# Patient Record
Sex: FEMALE | Race: BLACK OR AFRICAN AMERICAN | Employment: FULL TIME | URBAN - METROPOLITAN AREA
[De-identification: names, ages, dates, MRNs, and addresses within clinical notes are randomized per-mention and may not be internally consistent; named-entity substitution may affect disease eponyms.]

---

## 2018-10-17 ENCOUNTER — HOSPITAL ENCOUNTER (EMERGENCY)
Facility: HOSPITAL | Age: 48
Discharge: HOME | End: 2018-10-17
Attending: EMERGENCY MEDICINE

## 2018-10-17 VITALS
WEIGHT: 215 LBS | HEIGHT: 64 IN | BODY MASS INDEX: 36.7 KG/M2 | HEART RATE: 79 BPM | RESPIRATION RATE: 20 BRPM | DIASTOLIC BLOOD PRESSURE: 106 MMHG | OXYGEN SATURATION: 99 % | SYSTOLIC BLOOD PRESSURE: 179 MMHG | TEMPERATURE: 98 F

## 2018-10-17 DIAGNOSIS — S39.012A BACK STRAIN, INITIAL ENCOUNTER: Primary | ICD-10-CM
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
## 2018-10-17 DIAGNOSIS — I10 ASYMPTOMATIC HYPERTENSION: ICD-10-CM

## 2018-10-17 PROCEDURE — 99284 EMERGENCY DEPT VISIT MOD MDM: CPT

## 2018-10-17 RX ORDER — IBUPROFEN 600 MG/1
600 TABLET ORAL EVERY 8 HOURS PRN
Qty: 20 TABLET | Refills: 0 | Status: SHIPPED | OUTPATIENT
Start: 2018-10-17 | End: 2018-10-24

## 2018-10-17 RX ORDER — AMLODIPINE BESYLATE 5 MG/1
5 TABLET ORAL ONCE
Status: COMPLETED | OUTPATIENT
Start: 2018-10-17 | End: 2018-10-17

## 2018-10-17 RX ORDER — AMLODIPINE BESYLATE 5 MG/1
5 TABLET ORAL DAILY
Qty: 30 TABLET | Refills: 0 | Status: SHIPPED | OUTPATIENT
Start: 2018-10-17

## 2018-10-17 RX ORDER — CYCLOBENZAPRINE HCL 10 MG
10 TABLET ORAL 3 TIMES DAILY PRN
Qty: 20 TABLET | Refills: 0 | Status: SHIPPED | OUTPATIENT
Start: 2018-10-17 | End: 2018-10-24
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
## 2022-03-24 ENCOUNTER — APPOINTMENT (OUTPATIENT)
Dept: ULTRASOUND IMAGING | Facility: HOSPITAL | Age: 52
End: 2022-03-24
Attending: EMERGENCY MEDICINE
Payer: MEDICAID

## 2022-03-24 ENCOUNTER — APPOINTMENT (OUTPATIENT)
Dept: CT IMAGING | Facility: HOSPITAL | Age: 52
End: 2022-03-24
Attending: EMERGENCY MEDICINE
Payer: MEDICAID

## 2022-03-24 ENCOUNTER — APPOINTMENT (OUTPATIENT)
Dept: MRI IMAGING | Facility: HOSPITAL | Age: 52
End: 2022-03-24
Attending: INTERNAL MEDICINE
Payer: MEDICAID

## 2022-03-24 ENCOUNTER — HOSPITAL ENCOUNTER (INPATIENT)
Facility: HOSPITAL | Age: 52
LOS: 3 days | Discharge: HOME | End: 2022-03-27
Attending: EMERGENCY MEDICINE
Payer: MEDICAID

## 2022-03-24 ENCOUNTER — HOSPITAL ENCOUNTER (INPATIENT)
Facility: HOSPITAL | Age: 52
LOS: 3 days | Discharge: HOME | End: 2022-03-27
Attending: EMERGENCY MEDICINE | Admitting: HOSPITALIST
Payer: MEDICAID

## 2022-03-24 DIAGNOSIS — R74.01 TRANSAMINITIS: Primary | ICD-10-CM

## 2022-03-24 DIAGNOSIS — I10 UNCONTROLLED HYPERTENSION: ICD-10-CM

## 2022-03-24 DIAGNOSIS — E87.1 HYPONATREMIA: ICD-10-CM

## 2022-03-24 DIAGNOSIS — R10.9 ABDOMINAL PAIN, ACUTE: ICD-10-CM

## 2022-03-24 DIAGNOSIS — R11.2 NAUSEA VOMITING AND DIARRHEA: ICD-10-CM

## 2022-03-24 DIAGNOSIS — R19.7 NAUSEA VOMITING AND DIARRHEA: ICD-10-CM

## 2022-03-24 DIAGNOSIS — K81.9 CHOLECYSTITIS: ICD-10-CM

## 2022-03-24 PROBLEM — K80.63 CALCULUS OF GALLBLADDER AND BILE DUCT WITH ACUTE CHOLECYSTITIS, WITH OBSTRUCTION: Status: ACTIVE | Noted: 2022-03-24

## 2022-03-24 PROBLEM — R73.9 HYPERGLYCEMIA: Status: ACTIVE | Noted: 2022-03-24

## 2022-03-24 LAB
ALBUMIN SERPL-MCNC: 2.8 G/DL (ref 3.4–5)
ALP LIVER SERPL-CCNC: 790 U/L
ALT SERPL-CCNC: 169 U/L
ANION GAP SERPL CALC-SCNC: 9 MMOL/L (ref 0–18)
AST SERPL-CCNC: 185 U/L (ref 15–37)
BASOPHILS # BLD AUTO: 0.02 X10(3) UL (ref 0–0.2)
BASOPHILS NFR BLD AUTO: 0.2 %
BILIRUB DIRECT SERPL-MCNC: 4.9 MG/DL (ref 0–0.2)
BILIRUB SERPL-MCNC: 8.1 MG/DL (ref 0.1–2)
BILIRUB UR QL CFM: NEGATIVE
BUN BLD-MCNC: 8 MG/DL (ref 7–18)
BUN/CREAT SERPL: 11 (ref 10–20)
CALCIUM BLD-MCNC: 9.1 MG/DL (ref 8.5–10.1)
CHLORIDE SERPL-SCNC: 90 MMOL/L (ref 98–112)
CO2 SERPL-SCNC: 28 MMOL/L (ref 21–32)
COLOR UR: YELLOW
CREAT BLD-MCNC: 0.73 MG/DL
DEPRECATED RDW RBC AUTO: 44.2 FL (ref 35.1–46.3)
EOSINOPHIL # BLD AUTO: 0 X10(3) UL (ref 0–0.7)
EOSINOPHIL NFR BLD AUTO: 0 %
ERYTHROCYTE [DISTWIDTH] IN BLOOD BY AUTOMATED COUNT: 13.1 % (ref 11–15)
GLUCOSE BLD-MCNC: 136 MG/DL (ref 70–99)
GLUCOSE UR-MCNC: 50 MG/DL
HAV IGM SER QL: NONREACTIVE
HBV CORE IGM SER QL: NONREACTIVE
HBV SURFACE AG SERPL QL IA: NONREACTIVE
HCT VFR BLD AUTO: 43.5 %
HCV AB SERPL QL IA: NONREACTIVE
HGB BLD-MCNC: 14.4 G/DL
HYALINE CASTS #/AREA URNS AUTO: PRESENT /LPF
IMM GRANULOCYTES # BLD AUTO: 0.05 X10(3) UL (ref 0–1)
IMM GRANULOCYTES NFR BLD: 0.4 %
LEUKOCYTE ESTERASE UR QL STRIP.AUTO: NEGATIVE
LIPASE SERPL-CCNC: 116 U/L (ref 73–393)
LYMPHOCYTES # BLD AUTO: 1.44 X10(3) UL (ref 1–4)
LYMPHOCYTES NFR BLD AUTO: 10.9 %
MCH RBC QN AUTO: 30.5 PG (ref 26–34)
MCHC RBC AUTO-ENTMCNC: 33.1 G/DL (ref 31–37)
MCV RBC AUTO: 92.2 FL
MONOCYTES # BLD AUTO: 0.57 X10(3) UL (ref 0.1–1)
MONOCYTES NFR BLD AUTO: 4.3 %
NEUTROPHILS # BLD AUTO: 11.11 X10 (3) UL (ref 1.5–7.7)
NEUTROPHILS # BLD AUTO: 11.11 X10(3) UL (ref 1.5–7.7)
NEUTROPHILS NFR BLD AUTO: 84.2 %
NITRITE UR QL STRIP.AUTO: NEGATIVE
OSMOLALITY SERPL CALC.SUM OF ELEC: 264 MOSM/KG (ref 275–295)
PH UR: 6 [PH] (ref 5–8)
PLATELET # BLD AUTO: 298 10(3)UL (ref 150–450)
POTASSIUM SERPL-SCNC: 3.8 MMOL/L (ref 3.5–5.1)
PROT SERPL-MCNC: 9.9 G/DL (ref 6.4–8.2)
PROT UR-MCNC: >=500 MG/DL
RBC # BLD AUTO: 4.72 X10(6)UL
RBC #/AREA URNS AUTO: >10 /HPF
SARS-COV-2 RNA RESP QL NAA+PROBE: NOT DETECTED
SODIUM SERPL-SCNC: 127 MMOL/L (ref 136–145)
SP GR UR STRIP: 1.02 (ref 1–1.03)
UROBILINOGEN UR STRIP-ACNC: 4
VIT C UR-MCNC: NEGATIVE MG/DL
WBC # BLD AUTO: 13.2 X10(3) UL (ref 4–11)

## 2022-03-24 PROCEDURE — 80048 BASIC METABOLIC PNL TOTAL CA: CPT | Performed by: EMERGENCY MEDICINE

## 2022-03-24 PROCEDURE — 96361 HYDRATE IV INFUSION ADD-ON: CPT

## 2022-03-24 PROCEDURE — 80074 ACUTE HEPATITIS PANEL: CPT | Performed by: EMERGENCY MEDICINE

## 2022-03-24 PROCEDURE — 96374 THER/PROPH/DIAG INJ IV PUSH: CPT

## 2022-03-24 PROCEDURE — 80076 HEPATIC FUNCTION PANEL: CPT | Performed by: SURGERY

## 2022-03-24 PROCEDURE — 99285 EMERGENCY DEPT VISIT HI MDM: CPT

## 2022-03-24 PROCEDURE — 74181 MRI ABDOMEN W/O CONTRAST: CPT | Performed by: INTERNAL MEDICINE

## 2022-03-24 PROCEDURE — 80076 HEPATIC FUNCTION PANEL: CPT | Performed by: EMERGENCY MEDICINE

## 2022-03-24 PROCEDURE — 83690 ASSAY OF LIPASE: CPT | Performed by: EMERGENCY MEDICINE

## 2022-03-24 PROCEDURE — 81001 URINALYSIS AUTO W/SCOPE: CPT | Performed by: EMERGENCY MEDICINE

## 2022-03-24 PROCEDURE — 96375 TX/PRO/DX INJ NEW DRUG ADDON: CPT

## 2022-03-24 PROCEDURE — 74177 CT ABD & PELVIS W/CONTRAST: CPT | Performed by: EMERGENCY MEDICINE

## 2022-03-24 PROCEDURE — 76376 3D RENDER W/INTRP POSTPROCES: CPT | Performed by: INTERNAL MEDICINE

## 2022-03-24 PROCEDURE — 76705 ECHO EXAM OF ABDOMEN: CPT | Performed by: EMERGENCY MEDICINE

## 2022-03-24 PROCEDURE — 85025 COMPLETE CBC W/AUTO DIFF WBC: CPT | Performed by: EMERGENCY MEDICINE

## 2022-03-24 RX ORDER — LABETALOL HYDROCHLORIDE 5 MG/ML
20 INJECTION, SOLUTION INTRAVENOUS ONCE
Status: COMPLETED | OUTPATIENT
Start: 2022-03-24 | End: 2022-03-24

## 2022-03-24 RX ORDER — AMLODIPINE BESYLATE 5 MG/1
5 TABLET ORAL DAILY
Status: DISCONTINUED | OUTPATIENT
Start: 2022-03-24 | End: 2022-03-25

## 2022-03-24 RX ORDER — HEPARIN SODIUM 5000 [USP'U]/ML
5000 INJECTION, SOLUTION INTRAVENOUS; SUBCUTANEOUS EVERY 12 HOURS SCHEDULED
Status: DISCONTINUED | OUTPATIENT
Start: 2022-03-24 | End: 2022-03-27

## 2022-03-24 RX ORDER — HYDRALAZINE HYDROCHLORIDE 20 MG/ML
5 INJECTION INTRAMUSCULAR; INTRAVENOUS EVERY 4 HOURS PRN
Status: DISCONTINUED | OUTPATIENT
Start: 2022-03-24 | End: 2022-03-27

## 2022-03-24 RX ORDER — ONDANSETRON 2 MG/ML
4 INJECTION INTRAMUSCULAR; INTRAVENOUS ONCE
Status: COMPLETED | OUTPATIENT
Start: 2022-03-24 | End: 2022-03-24

## 2022-03-24 RX ORDER — POTASSIUM CHLORIDE 14.9 MG/ML
20 INJECTION INTRAVENOUS ONCE
Status: COMPLETED | OUTPATIENT
Start: 2022-03-24 | End: 2022-03-24

## 2022-03-24 RX ORDER — SODIUM CHLORIDE 9 MG/ML
INJECTION, SOLUTION INTRAVENOUS CONTINUOUS
Status: DISCONTINUED | OUTPATIENT
Start: 2022-03-24 | End: 2022-03-27

## 2022-03-24 RX ORDER — ONDANSETRON 2 MG/ML
4 INJECTION INTRAMUSCULAR; INTRAVENOUS EVERY 6 HOURS PRN
Status: DISCONTINUED | OUTPATIENT
Start: 2022-03-24 | End: 2022-03-27

## 2022-03-24 RX ORDER — SODIUM CHLORIDE 9 MG/ML
INJECTION, SOLUTION INTRAVENOUS CONTINUOUS
Status: ACTIVE | OUTPATIENT
Start: 2022-03-24 | End: 2022-03-24

## 2022-03-24 NOTE — ED INITIAL ASSESSMENT (HPI)
Patient arrives ambulatory through triage with c/o of abdominal pain, and vomiting. Patient states \"I ate chipotle Tuesday and I have felt sick ever since\".

## 2022-03-24 NOTE — PLAN OF CARE
Patient is A&Ox4, on RA, remote tele in place. BP elevated, MD notified, Hydralazine given. Felt bloated, pain in RUQ. Was nauseated, Zofran given. NPO except sips with meds. Need stool for GI panel and Cdiff, endorsed to Leandro Dumont RN. MRI/MRCP ordered, awaiting results. Started on IV abx Zosyn and IVF. Plan anticipating cholecystectomy after ERCP evaluation if MRCP is positive. Call light w/in reach, safety measures in place, frequent roundings being done, will continue to monitor. Plan of care endorsed to Leandro Dumont [facility]. Problem: Patient Centered Care  Goal: Patient preferences are identified and integrated in the patient's plan of care  Description: Interventions:  - What would you like us to know as we care for you?  Pt home with son  - Provide timely, complete, and accurate information to patient/family  - Incorporate patient and family knowledge, values, beliefs, and cultural backgrounds into the planning and delivery of care  - Encourage patient/family to participate in care and decision-making at the level they choose  - Honor patient and family perspectives and choices  Outcome: Progressing     Problem: Patient/Family Goals  Goal: Patient/Family Long Term Goal  Description: Patient's Long Term Goal: To discharge home    Interventions:  -Monitor labs, results, and vitals  -Administer medication as prescribed  -Monitor for nausea and vomiting  -Assess and manage pain  -Assess for any new onset or worsening condition  -Monitor and prevent infection  - See additional Care Plan goals for specific interventions  Outcome: Progressing  Goal: Patient/Family Short Term Goal  Description: Patient's Short Term Goal: Manage abdominal discomfort    Interventions:   -Monitor and assess for abdominal pain  -Monitor for nausea and vomiting  -Administer medication as prescribed  -Manage new onset or worsening condition  - See additional Care Plan goals for specific interventions  Outcome: Progressing     Problem: GASTROINTESTINAL - ADULT  Goal: Minimal or absence of nausea and vomiting  Description: INTERVENTIONS:  - Maintain adequate hydration with IV or PO as ordered and tolerated  - Evaluate effectiveness of ordered antiemetic medications  - Provide nonpharmacologic comfort measures as appropriate  - Advance diet as tolerated, if ordered  - Obtain nutritional consult as needed  - Evaluate fluid balance  Outcome: Progressing  Goal: Maintains or returns to baseline bowel function  Description: INTERVENTIONS:  - Assess bowel function  - Maintain adequate hydration with IV or PO as ordered and tolerated  - Evaluate effectiveness of GI medications  - Encourage mobilization and activity  - Obtain nutritional consult as needed  - Establish a toileting routine/schedule  - Consider collaborating with pharmacy to review patient's medication profile  Outcome: Progressing     Problem: PAIN - ADULT  Goal: Verbalizes/displays adequate comfort level or patient's stated pain goal  Description: INTERVENTIONS:  - Encourage pt to monitor pain and request assistance  - Assess pain using appropriate pain scale  - Administer analgesics based on type and severity of pain and evaluate response  - Implement non-pharmacological measures as appropriate and evaluate response  - Consider cultural and social influences on pain and pain management  - Manage/alleviate anxiety  - Utilize distraction and/or relaxation techniques  - Notify MD/LIP if interventions unsuccessful or patient reports new pain  Outcome: Progressing     Problem: RISK FOR INFECTION - ADULT  Goal: Absence of fever/infection during anticipated neutropenic period  Description: INTERVENTIONS  - Monitor WBC  - Administer growth factors as ordered  - Implement neutropenic guidelines  Outcome: Progressing     Problem: DISCHARGE PLANNING  Goal: Discharge to home or other facility with appropriate resources  Description: INTERVENTIONS:  - Identify barriers to discharge w/pt and caregiver  - Include patient/family/discharge partner in discharge planning  - Arrange for needed discharge resources and transportation as appropriate  - Identify discharge learning needs (meds, wound care, etc)  - Arrange for interpreters to assist at discharge as needed  - Consider post-discharge preferences of patient/family/discharge partner  - Complete POLST form as appropriate  - Assess patient's ability to be responsible for managing their own health  - Refer to Case Management Department for coordinating discharge planning if the patient needs post-hospital services based on physician/LIP order or complex needs related to functional status, cognitive ability or social support system  Outcome: Progressing     Problem: HEMATOLOGIC - ADULT  Goal: Free from bleeding injury  Description: INTERVENTIONS:  - Avoid intramuscular injections, enemas and rectal medication administration  - Ensure safe mobilization of patient  - Hold pressure on venipuncture sites to achieve adequate hemostasis  - Assess for signs and symptoms of internal bleeding  - Monitor lab trends  - Patient is to report abnormal signs of bleeding to staff  - Avoid use of toothpicks and dental floss  - Use electric shaver for shaving  - Use soft bristle tooth brush  - Limit straining and forceful nose blowing  Outcome: Progressing

## 2022-03-24 NOTE — ED PROVIDER NOTES
Contrast enhanced CT of the abdomen/pelvis. Comparison: None. IMPRESSION:    Heterogeneously distended gallbladder containing irregular hyperdense material.  Findings may represent combination of heterogeneous wall thickening and hyperdense sludge/hemorrhage. Underlying small calculi and/or mass would be difficult to entirely exclude. Acute cholecystitis is a possibility. Gallbladder ultrasound is recommended for further characterization. There is mild intrahepatic biliary duct dilatation. Common bile duct is not dilated. Minimal stranding around the gallbladder. Portal vein, SMV, as well as the splenic vein appear patent. No evidence of AAA. Scattered fluid loops of small bowel. No evidence of small or large obstruction. No evidence of colitis or diverticulitis. Moderate stool burden on the right side. No hydronephrosis or nephrolithiasis. Decompressed ureters. No inflammation of the urinary bladder. No evidence of free intraperitoneal air to suggest perforation. Scattered subcentimeter retroperitoneal lymph nodes. Patient signed out to me by Dr. Jocelin Stallings. Patient with transaminitis. Patient with CT abdomen shows gallbladder containing hyperdense material with wall thickening hyperdense sludge. Will order ultrasound of the right upper quadrant to rule out choledocholithiasis. Patient will be admitted. Dr. Jaimee Allison notified of admission. Surgery on consult Dr. Aysha Bridges Time: 32 minutes including time spent examining and re-evaluating the patient, ordering and reviewing laboratory tests, documenting, reviewing previous records, obtaining information from the family, and speaking with consultants, admitting doctors, nurses and medics and excludes any time spent on procedures.

## 2022-03-24 NOTE — ED QUICK NOTES
Orders for admission, patient is aware of plan and ready to go upstairs. Any questions, please call ED RN Hannah Cruz at extension [phone].      Patient Covid vaccination status: Fully vaccinated     COVID Test Ordered in ED: Rapid SARS-CoV-2 by PCR    COVID Suspicion at Admission: Low clinical suspicion for COVID    Running Infusions:  None    Mental Status/LOC at time of transport: x4    Other pertinent information: n/a  CIWA score: N/A   NIH score:  N/A

## 2022-03-25 LAB
ALBUMIN SERPL-MCNC: 2 G/DL (ref 3.4–5)
ALBUMIN SERPL-MCNC: 2.6 G/DL (ref 3.4–5)
ALP LIVER SERPL-CCNC: 530 U/L
ALP LIVER SERPL-CCNC: 778 U/L
ALT SERPL-CCNC: 112 U/L
ALT SERPL-CCNC: 155 U/L
ANION GAP SERPL CALC-SCNC: 4 MMOL/L (ref 0–18)
AST SERPL-CCNC: 162 U/L (ref 15–37)
AST SERPL-CCNC: 95 U/L (ref 15–37)
BASOPHILS # BLD AUTO: 0.04 X10(3) UL (ref 0–0.2)
BASOPHILS NFR BLD AUTO: 0.3 %
BILIRUB DIRECT SERPL-MCNC: 5.8 MG/DL (ref 0–0.2)
BILIRUB DIRECT SERPL-MCNC: 6.2 MG/DL (ref 0–0.2)
BILIRUB SERPL-MCNC: 7.5 MG/DL (ref 0.1–2)
BILIRUB SERPL-MCNC: 8.4 MG/DL (ref 0.1–2)
BUN BLD-MCNC: 13 MG/DL (ref 7–18)
BUN/CREAT SERPL: 14.4 (ref 10–20)
CALCIUM BLD-MCNC: 8.7 MG/DL (ref 8.5–10.1)
CHLORIDE SERPL-SCNC: 104 MMOL/L (ref 98–112)
CO2 SERPL-SCNC: 30 MMOL/L (ref 21–32)
CREAT BLD-MCNC: 0.9 MG/DL
DEPRECATED RDW RBC AUTO: 46.9 FL (ref 35.1–46.3)
EOSINOPHIL # BLD AUTO: 0 X10(3) UL (ref 0–0.7)
EOSINOPHIL NFR BLD AUTO: 0 %
ERYTHROCYTE [DISTWIDTH] IN BLOOD BY AUTOMATED COUNT: 13.4 % (ref 11–15)
GLUCOSE BLD-MCNC: 100 MG/DL (ref 70–99)
HCT VFR BLD AUTO: 36 %
HGB BLD-MCNC: 11.7 G/DL
IMM GRANULOCYTES # BLD AUTO: 0.06 X10(3) UL (ref 0–1)
IMM GRANULOCYTES NFR BLD: 0.5 %
LYMPHOCYTES # BLD AUTO: 3.24 X10(3) UL (ref 1–4)
LYMPHOCYTES NFR BLD AUTO: 25 %
MCH RBC QN AUTO: 31 PG (ref 26–34)
MCHC RBC AUTO-ENTMCNC: 32.5 G/DL (ref 31–37)
MCV RBC AUTO: 95.2 FL
MONOCYTES # BLD AUTO: 0.98 X10(3) UL (ref 0.1–1)
MONOCYTES NFR BLD AUTO: 7.6 %
NEUTROPHILS # BLD AUTO: 8.65 X10 (3) UL (ref 1.5–7.7)
NEUTROPHILS # BLD AUTO: 8.65 X10(3) UL (ref 1.5–7.7)
NEUTROPHILS NFR BLD AUTO: 66.6 %
OSMOLALITY SERPL CALC.SUM OF ELEC: 286 MOSM/KG (ref 275–295)
PLATELET # BLD AUTO: 266 10(3)UL (ref 150–450)
POTASSIUM SERPL-SCNC: 3.7 MMOL/L (ref 3.5–5.1)
POTASSIUM SERPL-SCNC: 3.7 MMOL/L (ref 3.5–5.1)
PROT SERPL-MCNC: 7.4 G/DL (ref 6.4–8.2)
PROT SERPL-MCNC: 8.5 G/DL (ref 6.4–8.2)
RBC # BLD AUTO: 3.78 X10(6)UL
SODIUM SERPL-SCNC: 138 MMOL/L (ref 136–145)
WBC # BLD AUTO: 13 X10(3) UL (ref 4–11)

## 2022-03-25 PROCEDURE — 85025 COMPLETE CBC W/AUTO DIFF WBC: CPT | Performed by: HOSPITALIST

## 2022-03-25 PROCEDURE — 80048 BASIC METABOLIC PNL TOTAL CA: CPT | Performed by: HOSPITALIST

## 2022-03-25 PROCEDURE — 80076 HEPATIC FUNCTION PANEL: CPT | Performed by: PHYSICIAN ASSISTANT

## 2022-03-25 PROCEDURE — 84132 ASSAY OF SERUM POTASSIUM: CPT | Performed by: HOSPITALIST

## 2022-03-25 RX ORDER — ACETAMINOPHEN 325 MG/1
650 TABLET ORAL EVERY 6 HOURS PRN
Status: DISCONTINUED | OUTPATIENT
Start: 2022-03-25 | End: 2022-03-27

## 2022-03-25 RX ORDER — AMLODIPINE BESYLATE 10 MG/1
10 TABLET ORAL DAILY
Status: DISCONTINUED | OUTPATIENT
Start: 2022-03-26 | End: 2022-03-27

## 2022-03-25 NOTE — PROGRESS NOTES
Pt A&ox4. On RA. Hypertensive at times, PRN hydralazine given. RUQ, declined medication. PRN zofran given for nausea. IV fluids and IV abx started. MRI MRCP completed. No interventions at this time per GI. Awaiting Sx. Tolerating clear liquid diet, plan to be NPO at midnight. Up independently. Safety measures in place. Pt calls appropriately.

## 2022-03-25 NOTE — PLAN OF CARE
Problem: Patient Centered Care  Goal: Patient preferences are identified and integrated in the patient's plan of care  Description: Interventions:  - What would you like us to know as we care for you?  Pt home with son  - Provide timely, complete, and accurate information to patient/family  - Incorporate patient and family knowledge, values, beliefs, and cultural backgrounds into the planning and delivery of care  - Encourage patient/family to participate in care and decision-making at the level they choose  - Honor patient and family perspectives and choices  Outcome: Progressing     Problem: Patient/Family Goals  Goal: Patient/Family Long Term Goal  Description: Patient's Long Term Goal: To discharge home    Interventions:  -Monitor labs, results, and vitals  -Administer medication as prescribed  -Monitor for nausea and vomiting  -Assess and manage pain  -Assess for any new onset or worsening condition  -Monitor and prevent infection  - See additional Care Plan goals for specific interventions  Outcome: Progressing  Goal: Patient/Family Short Term Goal  Description: Patient's Short Term Goal: Manage abdominal discomfort    Interventions:   -Monitor and assess for abdominal pain  -Monitor for nausea and vomiting  -Administer medication as prescribed  -Manage new onset or worsening condition  - See additional Care Plan goals for specific interventions  Outcome: Progressing     Problem: GASTROINTESTINAL - ADULT  Goal: Minimal or absence of nausea and vomiting  Description: INTERVENTIONS:  - Maintain adequate hydration with IV or PO as ordered and tolerated  - Evaluate effectiveness of ordered antiemetic medications  - Provide nonpharmacologic comfort measures as appropriate  - Advance diet as tolerated, if ordered  - Obtain nutritional consult as needed  - Evaluate fluid balance  Outcome: Progressing  Goal: Maintains or returns to baseline bowel function  Description: INTERVENTIONS:  - Assess bowel function  - Maintain adequate hydration with IV or PO as ordered and tolerated  - Evaluate effectiveness of GI medications  - Encourage mobilization and activity  - Obtain nutritional consult as needed  - Establish a toileting routine/schedule  - Consider collaborating with pharmacy to review patient's medication profile  Outcome: Progressing     Problem: PAIN - ADULT  Goal: Verbalizes/displays adequate comfort level or patient's stated pain goal  Description: INTERVENTIONS:  - Encourage pt to monitor pain and request assistance  - Assess pain using appropriate pain scale  - Administer analgesics based on type and severity of pain and evaluate response  - Implement non-pharmacological measures as appropriate and evaluate response  - Consider cultural and social influences on pain and pain management  - Manage/alleviate anxiety  - Utilize distraction and/or relaxation techniques  - Notify MD/LIP if interventions unsuccessful or patient reports new pain  Outcome: Progressing     Problem: RISK FOR INFECTION - ADULT  Goal: Absence of fever/infection during anticipated neutropenic period  Description: INTERVENTIONS  - Monitor WBC  - Administer growth factors as ordered  - Implement neutropenic guidelines  Outcome: Progressing     Problem: DISCHARGE PLANNING  Goal: Discharge to home or other facility with appropriate resources  Description: INTERVENTIONS:  - Identify barriers to discharge w/pt and caregiver  - Include patient/family/discharge partner in discharge planning  - Arrange for needed discharge resources and transportation as appropriate  - Identify discharge learning needs (meds, wound care, etc)  - Arrange for interpreters to assist at discharge as needed  - Consider post-discharge preferences of patient/family/discharge partner  - Complete POLST form as appropriate  - Assess patient's ability to be responsible for managing their own health  - Refer to Case Management Department for coordinating discharge planning if the patient needs post-hospital services based on physician/LIP order or complex needs related to functional status, cognitive ability or social support system  Outcome: Progressing     Problem: HEMATOLOGIC - ADULT  Goal: Free from bleeding injury  Description: INTERVENTIONS:  - Avoid intramuscular injections, enemas and rectal medication administration  - Ensure safe mobilization of patient  - Hold pressure on venipuncture sites to achieve adequate hemostasis  - Assess for signs and symptoms of internal bleeding  - Monitor lab trends  - Patient is to report abnormal signs of bleeding to staff  - Avoid use of toothpicks and dental floss  - Use electric shaver for shaving  - Use soft bristle tooth brush  - Limit straining and forceful nose blowing  Outcome: Progressing   No acute changes overnight. Blood pressure is still elevated, PRN Hydralazine given. Minimal tolerable abdominal pain. No nausea and vomiting. Tolerated clear liquid diet last night, patient is now NPO for possible procedure. IV fluids and IV Zosyn continued. Ambulates independently. Voids freely. No bowel movement overnight. Will continue to monitor.

## 2022-03-26 RX ORDER — VANCOMYCIN HYDROCHLORIDE 125 MG/1
125 CAPSULE ORAL DAILY
Status: DISCONTINUED | OUTPATIENT
Start: 2022-03-26 | End: 2022-03-27

## 2022-03-26 NOTE — PLAN OF CARE
Problem: Patient Centered Care  Goal: Patient preferences are identified and integrated in the patient's plan of care  Description: Interventions:  - What would you like us to know as we care for you?  Pt home with son  - Provide timely, complete, and accurate information to patient/family  - Incorporate patient and family knowledge, values, beliefs, and cultural backgrounds into the planning and delivery of care  - Encourage patient/family to participate in care and decision-making at the level they choose  - Honor patient and family perspectives and choices  Outcome: Progressing     Problem: Patient/Family Goals  Goal: Patient/Family Long Term Goal  Description: Patient's Long Term Goal: To discharge home    Interventions:  -Monitor labs, results, and vitals  -Administer medication as prescribed  -Monitor for nausea and vomiting  -Assess and manage pain  -Assess for any new onset or worsening condition  -Monitor and prevent infection  - See additional Care Plan goals for specific interventions  Outcome: Progressing  Goal: Patient/Family Short Term Goal  Description: Patient's Short Term Goal: Manage abdominal discomfort    Interventions:   -Monitor and assess for abdominal pain  -Monitor for nausea and vomiting  -Administer medication as prescribed  -Manage new onset or worsening condition  - See additional Care Plan goals for specific interventions  Outcome: Progressing     Problem: GASTROINTESTINAL - ADULT  Goal: Minimal or absence of nausea and vomiting  Description: INTERVENTIONS:  - Maintain adequate hydration with IV or PO as ordered and tolerated  - Evaluate effectiveness of ordered antiemetic medications  - Provide nonpharmacologic comfort measures as appropriate  - Advance diet as tolerated, if ordered  - Obtain nutritional consult as needed  - Evaluate fluid balance  Outcome: Progressing  Goal: Maintains or returns to baseline bowel function  Description: INTERVENTIONS:  - Assess bowel function  - Maintain adequate hydration with IV or PO as ordered and tolerated  - Evaluate effectiveness of GI medications  - Encourage mobilization and activity  - Obtain nutritional consult as needed  - Establish a toileting routine/schedule  - Consider collaborating with pharmacy to review patient's medication profile  Outcome: Progressing     Problem: PAIN - ADULT  Goal: Verbalizes/displays adequate comfort level or patient's stated pain goal  Description: INTERVENTIONS:  - Encourage pt to monitor pain and request assistance  - Assess pain using appropriate pain scale  - Administer analgesics based on type and severity of pain and evaluate response  - Implement non-pharmacological measures as appropriate and evaluate response  - Consider cultural and social influences on pain and pain management  - Manage/alleviate anxiety  - Utilize distraction and/or relaxation techniques  - Notify MD/LIP if interventions unsuccessful or patient reports new pain  Outcome: Progressing     Problem: RISK FOR INFECTION - ADULT  Goal: Absence of fever/infection during anticipated neutropenic period  Description: INTERVENTIONS  - Monitor WBC  - Administer growth factors as ordered  - Implement neutropenic guidelines  Outcome: Progressing     Problem: DISCHARGE PLANNING  Goal: Discharge to home or other facility with appropriate resources  Description: INTERVENTIONS:  - Identify barriers to discharge w/pt and caregiver  - Include patient/family/discharge partner in discharge planning  - Arrange for needed discharge resources and transportation as appropriate  - Identify discharge learning needs (meds, wound care, etc)  - Arrange for interpreters to assist at discharge as needed  - Consider post-discharge preferences of patient/family/discharge partner  - Complete POLST form as appropriate  - Assess patient's ability to be responsible for managing their own health  - Refer to Case Management Department for coordinating discharge planning if the patient needs post-hospital services based on physician/LIP order or complex needs related to functional status, cognitive ability or social support system  Outcome: Progressing     Problem: HEMATOLOGIC - ADULT  Goal: Free from bleeding injury  Description: INTERVENTIONS:  - Avoid intramuscular injections, enemas and rectal medication administration  - Ensure safe mobilization of patient  - Hold pressure on venipuncture sites to achieve adequate hemostasis  - Assess for signs and symptoms of internal bleeding  - Monitor lab trends  - Patient is to report abnormal signs of bleeding to staff  - Avoid use of toothpicks and dental floss  - Use electric shaver for shaving  - Use soft bristle tooth brush  - Limit straining and forceful nose blowing  Outcome: Progressing     Rodrigo Cardenas states she is feeling better today. Only mild pain/discomfort which is \"positional,\" denies need for prn medication at this time. IVF, IV abx continued. Ambulating independently in room. Voiding freely. Tolerating diet well.

## 2022-03-27 VITALS
WEIGHT: 181 LBS | BODY MASS INDEX: 30.9 KG/M2 | OXYGEN SATURATION: 100 % | HEART RATE: 84 BPM | SYSTOLIC BLOOD PRESSURE: 147 MMHG | HEIGHT: 64 IN | DIASTOLIC BLOOD PRESSURE: 87 MMHG | RESPIRATION RATE: 18 BRPM | TEMPERATURE: 99 F

## 2022-03-27 LAB
ALBUMIN SERPL-MCNC: 2.1 G/DL (ref 3.4–5)
ALBUMIN/GLOB SERPL: 0.4 {RATIO} (ref 1–2)
ALP LIVER SERPL-CCNC: 489 U/L
ALT SERPL-CCNC: 94 U/L
ANION GAP SERPL CALC-SCNC: 5 MMOL/L (ref 0–18)
AST SERPL-CCNC: 89 U/L (ref 15–37)
BASOPHILS # BLD AUTO: 0.05 X10(3) UL (ref 0–0.2)
BASOPHILS NFR BLD AUTO: 0.5 %
BILIRUB DIRECT SERPL-MCNC: 5.2 MG/DL (ref 0–0.2)
BILIRUB SERPL-MCNC: 6.6 MG/DL (ref 0.1–2)
BUN BLD-MCNC: 9 MG/DL (ref 7–18)
BUN/CREAT SERPL: 11.8 (ref 10–20)
CALCIUM BLD-MCNC: 8.4 MG/DL (ref 8.5–10.1)
CHLORIDE SERPL-SCNC: 104 MMOL/L (ref 98–112)
CO2 SERPL-SCNC: 26 MMOL/L (ref 21–32)
CREAT BLD-MCNC: 0.76 MG/DL
DEPRECATED RDW RBC AUTO: 47.4 FL (ref 35.1–46.3)
EOSINOPHIL # BLD AUTO: 0 X10(3) UL (ref 0–0.7)
EOSINOPHIL NFR BLD AUTO: 0 %
ERYTHROCYTE [DISTWIDTH] IN BLOOD BY AUTOMATED COUNT: 13.2 % (ref 11–15)
GLOBULIN PLAS-MCNC: 5.5 G/DL (ref 2.8–4.4)
GLUCOSE BLD-MCNC: 93 MG/DL (ref 70–99)
HCT VFR BLD AUTO: 33.8 %
HGB BLD-MCNC: 10.8 G/DL
IMM GRANULOCYTES # BLD AUTO: 0.05 X10(3) UL (ref 0–1)
IMM GRANULOCYTES NFR BLD: 0.5 %
LYMPHOCYTES # BLD AUTO: 2.62 X10(3) UL (ref 1–4)
LYMPHOCYTES NFR BLD AUTO: 24.2 %
MCH RBC QN AUTO: 30.9 PG (ref 26–34)
MCHC RBC AUTO-ENTMCNC: 32 G/DL (ref 31–37)
MCV RBC AUTO: 96.8 FL
MONOCYTES # BLD AUTO: 0.85 X10(3) UL (ref 0.1–1)
MONOCYTES NFR BLD AUTO: 7.8 %
NEUTROPHILS # BLD AUTO: 7.26 X10 (3) UL (ref 1.5–7.7)
NEUTROPHILS # BLD AUTO: 7.26 X10(3) UL (ref 1.5–7.7)
NEUTROPHILS NFR BLD AUTO: 67 %
OSMOLALITY SERPL CALC.SUM OF ELEC: 278 MOSM/KG (ref 275–295)
PLATELET # BLD AUTO: 311 10(3)UL (ref 150–450)
POTASSIUM SERPL-SCNC: 3.6 MMOL/L (ref 3.5–5.1)
POTASSIUM SERPL-SCNC: 4 MMOL/L (ref 3.5–5.1)
PROT SERPL-MCNC: 7.6 G/DL (ref 6.4–8.2)
RBC # BLD AUTO: 3.49 X10(6)UL
SODIUM SERPL-SCNC: 135 MMOL/L (ref 136–145)
WBC # BLD AUTO: 10.8 X10(3) UL (ref 4–11)

## 2022-03-27 PROCEDURE — 85025 COMPLETE CBC W/AUTO DIFF WBC: CPT | Performed by: SURGERY

## 2022-03-27 PROCEDURE — 82248 BILIRUBIN DIRECT: CPT | Performed by: SURGERY

## 2022-03-27 PROCEDURE — 90471 IMMUNIZATION ADMIN: CPT

## 2022-03-27 PROCEDURE — 80053 COMPREHEN METABOLIC PANEL: CPT | Performed by: HOSPITALIST

## 2022-03-27 RX ORDER — AMLODIPINE BESYLATE 10 MG/1
10 TABLET ORAL DAILY
Qty: 30 TABLET | Refills: 0 | Status: SHIPPED | OUTPATIENT
Start: 2022-03-28 | End: 2022-04-27

## 2022-03-27 RX ORDER — LEVOFLOXACIN 750 MG/1
750 TABLET ORAL DAILY
Qty: 14 TABLET | Refills: 0 | Status: SHIPPED | OUTPATIENT
Start: 2022-03-27 | End: 2022-04-10

## 2022-03-27 NOTE — PLAN OF CARE
Patient discharged home. IV antibiotics completed. Oral antibiotics sent to patient's pharmacy. Patient tolerating diet without nausea or abdominal pain. Flu shot administered at discharge. Problem: Patient Centered Care  Goal: Patient preferences are identified and integrated in the patient's plan of care  Description: Interventions:  - What would you like us to know as we care for you?  Pt home with son  - Provide timely, complete, and accurate information to patient/family  - Incorporate patient and family knowledge, values, beliefs, and cultural backgrounds into the planning and delivery of care  - Encourage patient/family to participate in care and decision-making at the level they choose  - Honor patient and family perspectives and choices  Outcome: Adequate for Discharge     Problem: Patient/Family Goals  Goal: Patient/Family Long Term Goal  Description: Patient's Long Term Goal: To discharge home    Interventions:  -Monitor labs, results, and vitals  -Administer medication as prescribed  -Monitor for nausea and vomiting  -Assess and manage pain  -Assess for any new onset or worsening condition  -Monitor and prevent infection  - See additional Care Plan goals for specific interventions  Outcome: Adequate for Discharge  Goal: Patient/Family Short Term Goal  Description: Patient's Short Term Goal: Manage abdominal discomfort    Interventions:   -Monitor and assess for abdominal pain  -Monitor for nausea and vomiting  -Administer medication as prescribed  -Manage new onset or worsening condition  - See additional Care Plan goals for specific interventions  Outcome: Adequate for Discharge     Problem: GASTROINTESTINAL - ADULT  Goal: Minimal or absence of nausea and vomiting  Description: INTERVENTIONS:  - Maintain adequate hydration with IV or PO as ordered and tolerated  - Evaluate effectiveness of ordered antiemetic medications  - Provide nonpharmacologic comfort measures as appropriate  - Advance diet as tolerated, if ordered  - Obtain nutritional consult as needed  - Evaluate fluid balance  Outcome: Adequate for Discharge  Goal: Maintains or returns to baseline bowel function  Description: INTERVENTIONS:  - Assess bowel function  - Maintain adequate hydration with IV or PO as ordered and tolerated  - Evaluate effectiveness of GI medications  - Encourage mobilization and activity  - Obtain nutritional consult as needed  - Establish a toileting routine/schedule  - Consider collaborating with pharmacy to review patient's medication profile  Outcome: Adequate for Discharge     Problem: PAIN - ADULT  Goal: Verbalizes/displays adequate comfort level or patient's stated pain goal  Description: INTERVENTIONS:  - Encourage pt to monitor pain and request assistance  - Assess pain using appropriate pain scale  - Administer analgesics based on type and severity of pain and evaluate response  - Implement non-pharmacological measures as appropriate and evaluate response  - Consider cultural and social influences on pain and pain management  - Manage/alleviate anxiety  - Utilize distraction and/or relaxation techniques  - Notify MD/LIP if interventions unsuccessful or patient reports new pain  Outcome: Adequate for Discharge     Problem: RISK FOR INFECTION - ADULT  Goal: Absence of fever/infection during anticipated neutropenic period  Description: INTERVENTIONS  - Monitor WBC  - Administer growth factors as ordered  - Implement neutropenic guidelines  Outcome: Adequate for Discharge     Problem: DISCHARGE PLANNING  Goal: Discharge to home or other facility with appropriate resources  Description: INTERVENTIONS:  - Identify barriers to discharge w/pt and caregiver  - Include patient/family/discharge partner in discharge planning  - Arrange for needed discharge resources and transportation as appropriate  - Identify discharge learning needs (meds, wound care, etc)  - Arrange for interpreters to assist at discharge as needed  - Consider post-discharge preferences of patient/family/discharge partner  - Complete POLST form as appropriate  - Assess patient's ability to be responsible for managing their own health  - Refer to Case Management Department for coordinating discharge planning if the patient needs post-hospital services based on physician/LIP order or complex needs related to functional status, cognitive ability or social support system  Outcome: Adequate for Discharge     Problem: HEMATOLOGIC - ADULT  Goal: Free from bleeding injury  Description: INTERVENTIONS:  - Avoid intramuscular injections, enemas and rectal medication administration  - Ensure safe mobilization of patient  - Hold pressure on venipuncture sites to achieve adequate hemostasis  - Assess for signs and symptoms of internal bleeding  - Monitor lab trends  - Patient is to report abnormal signs of bleeding to staff  - Avoid use of toothpicks and dental floss  - Use electric shaver for shaving  - Use soft bristle tooth brush  - Limit straining and forceful nose blowing  Outcome: Adequate for Discharge

## 2022-03-29 ENCOUNTER — PATIENT OUTREACH (OUTPATIENT)
Dept: CASE MANAGEMENT | Age: 52
End: 2022-03-29

## 2022-03-29 NOTE — PROGRESS NOTES
Attempted to contact pt for condition update however no answer. Call continued to ring and did not go to a VM. Will await a returned phone call.

## 2022-03-29 NOTE — PROGRESS NOTES
Attempted to contact pt for condition update however no answer. Call continued to ring and did not go to a . NCM to try again at a later time.    Verify PCP

## 2022-11-27 ENCOUNTER — HOSPITAL ENCOUNTER (INPATIENT)
Facility: HOSPITAL | Age: 52
LOS: 8 days | Discharge: HOME | DRG: 409 | End: 2022-12-06
Attending: EMERGENCY MEDICINE | Admitting: INTERNAL MEDICINE
Payer: MEDICAID

## 2022-11-27 DIAGNOSIS — K80.50 BILIARY COLIC: ICD-10-CM

## 2022-11-27 DIAGNOSIS — K81.0 ACUTE CHOLECYSTITIS: Primary | ICD-10-CM

## 2022-11-27 DIAGNOSIS — K80.50 CHOLEDOCHOLITHIASIS: ICD-10-CM

## 2022-11-28 ENCOUNTER — ANESTHESIA (OUTPATIENT)
Dept: ENDOSCOPY | Facility: HOSPITAL | Age: 52
End: 2022-11-28
Payer: MEDICAID

## 2022-11-28 ENCOUNTER — APPOINTMENT (OUTPATIENT)
Dept: ULTRASOUND IMAGING | Facility: HOSPITAL | Age: 52
DRG: 409 | End: 2022-11-28
Attending: EMERGENCY MEDICINE
Payer: MEDICAID

## 2022-11-28 ENCOUNTER — APPOINTMENT (OUTPATIENT)
Dept: GENERAL RADIOLOGY | Facility: HOSPITAL | Age: 52
DRG: 409 | End: 2022-11-28
Attending: INTERNAL MEDICINE
Payer: MEDICAID

## 2022-11-28 ENCOUNTER — ANESTHESIA EVENT (OUTPATIENT)
Dept: ENDOSCOPY | Facility: HOSPITAL | Age: 52
End: 2022-11-28
Payer: MEDICAID

## 2022-11-28 ENCOUNTER — TELEPHONE (OUTPATIENT)
Dept: GASTROENTEROLOGY | Facility: CLINIC | Age: 52
End: 2022-11-28

## 2022-11-28 PROBLEM — K81.0 ACUTE CHOLECYSTITIS: Status: ACTIVE | Noted: 2022-11-28

## 2022-11-28 PROBLEM — K80.50 CHOLEDOCHOLITHIASIS: Status: ACTIVE | Noted: 2022-11-28

## 2022-11-28 PROBLEM — K80.50 BILIARY COLIC: Status: ACTIVE | Noted: 2022-11-28

## 2022-11-28 LAB
ALBUMIN SERPL-MCNC: 2.8 G/DL (ref 3.4–5)
ALP LIVER SERPL-CCNC: 844 U/L
ALT SERPL-CCNC: 132 U/L
ANION GAP SERPL CALC-SCNC: 8 MMOL/L (ref 0–18)
AST SERPL-CCNC: 151 U/L (ref 15–37)
BASOPHILS # BLD AUTO: 0.02 X10(3) UL (ref 0–0.2)
BASOPHILS NFR BLD AUTO: 0.2 %
BILIRUB DIRECT SERPL-MCNC: 7.4 MG/DL (ref 0–0.2)
BILIRUB SERPL-MCNC: 9.4 MG/DL (ref 0.1–2)
BUN BLD-MCNC: 10 MG/DL (ref 7–18)
BUN/CREAT SERPL: 12.5 (ref 10–20)
CALCIUM BLD-MCNC: 9.5 MG/DL (ref 8.5–10.1)
CHLORIDE SERPL-SCNC: 92 MMOL/L (ref 98–112)
CO2 SERPL-SCNC: 29 MMOL/L (ref 21–32)
CREAT BLD-MCNC: 0.8 MG/DL
DEPRECATED RDW RBC AUTO: 46.5 FL (ref 35.1–46.3)
EOSINOPHIL # BLD AUTO: 0 X10(3) UL (ref 0–0.7)
EOSINOPHIL NFR BLD AUTO: 0 %
ERYTHROCYTE [DISTWIDTH] IN BLOOD BY AUTOMATED COUNT: 13.7 % (ref 11–15)
GFR SERPLBLD BASED ON 1.73 SQ M-ARVRAT: 88 ML/MIN/1.73M2 (ref 60–?)
GLUCOSE BLD-MCNC: 142 MG/DL (ref 70–99)
HCT VFR BLD AUTO: 46.9 %
HGB BLD-MCNC: 15.1 G/DL
IMM GRANULOCYTES # BLD AUTO: 0.04 X10(3) UL (ref 0–1)
IMM GRANULOCYTES NFR BLD: 0.4 %
INR BLD: 1.14 (ref 0.85–1.16)
LACTATE SERPL-SCNC: 1.4 MMOL/L (ref 0.4–2)
LIPASE SERPL-CCNC: 96 U/L (ref 73–393)
LYMPHOCYTES # BLD AUTO: 0.97 X10(3) UL (ref 1–4)
LYMPHOCYTES NFR BLD AUTO: 8.6 %
MCH RBC QN AUTO: 29.8 PG (ref 26–34)
MCHC RBC AUTO-ENTMCNC: 32.2 G/DL (ref 31–37)
MCV RBC AUTO: 92.7 FL
MONOCYTES # BLD AUTO: 0.44 X10(3) UL (ref 0.1–1)
MONOCYTES NFR BLD AUTO: 3.9 %
NEUTROPHILS # BLD AUTO: 9.84 X10 (3) UL (ref 1.5–7.7)
NEUTROPHILS # BLD AUTO: 9.84 X10(3) UL (ref 1.5–7.7)
NEUTROPHILS NFR BLD AUTO: 86.9 %
OSMOLALITY SERPL CALC.SUM OF ELEC: 269 MOSM/KG (ref 275–295)
PLATELET # BLD AUTO: 360 10(3)UL (ref 150–450)
POTASSIUM SERPL-SCNC: 3.5 MMOL/L (ref 3.5–5.1)
PROT SERPL-MCNC: 9.8 G/DL (ref 6.4–8.2)
PROTHROMBIN TIME: 14.5 SECONDS (ref 11.6–14.8)
RBC # BLD AUTO: 5.06 X10(6)UL
SARS-COV-2 RNA RESP QL NAA+PROBE: NOT DETECTED
SODIUM SERPL-SCNC: 129 MMOL/L (ref 136–145)
WBC # BLD AUTO: 11.3 X10(3) UL (ref 4–11)

## 2022-11-28 PROCEDURE — 99254 IP/OBS CNSLTJ NEW/EST MOD 60: CPT | Performed by: INTERNAL MEDICINE

## 2022-11-28 PROCEDURE — 74328 X-RAY BILE DUCT ENDOSCOPY: CPT | Performed by: INTERNAL MEDICINE

## 2022-11-28 PROCEDURE — 43274 ERCP DUCT STENT PLACEMENT: CPT | Performed by: INTERNAL MEDICINE

## 2022-11-28 PROCEDURE — 99254 IP/OBS CNSLTJ NEW/EST MOD 60: CPT | Performed by: SURGERY

## 2022-11-28 PROCEDURE — 0F7D8DZ DILATION OF PANCREATIC DUCT WITH INTRALUMINAL DEVICE, VIA NATURAL OR ARTIFICIAL OPENING ENDOSCOPIC: ICD-10-PCS | Performed by: INTERNAL MEDICINE

## 2022-11-28 PROCEDURE — BF111ZZ FLUOROSCOPY OF BILIARY AND PANCREATIC DUCTS USING LOW OSMOLAR CONTRAST: ICD-10-PCS | Performed by: INTERNAL MEDICINE

## 2022-11-28 PROCEDURE — 0FC80ZZ EXTIRPATION OF MATTER FROM CYSTIC DUCT, OPEN APPROACH: ICD-10-PCS | Performed by: INTERNAL MEDICINE

## 2022-11-28 PROCEDURE — 76705 ECHO EXAM OF ABDOMEN: CPT | Performed by: EMERGENCY MEDICINE

## 2022-11-28 PROCEDURE — 0F798DZ DILATION OF COMMON BILE DUCT WITH INTRALUMINAL DEVICE, VIA NATURAL OR ARTIFICIAL OPENING ENDOSCOPIC: ICD-10-PCS | Performed by: INTERNAL MEDICINE

## 2022-11-28 DEVICE — BILIARY STENT WITH NAVIFLEXTM RX DELIVERY SYSTEM
Type: IMPLANTABLE DEVICE | Site: PANCREATIC | Status: FUNCTIONAL
Brand: ADVANIX™ BILIARY

## 2022-11-28 DEVICE — ZIMMON PANCREATIC STENT
Type: IMPLANTABLE DEVICE | Site: PANCREATIC | Status: FUNCTIONAL
Brand: ZIMMON

## 2022-11-28 RX ORDER — MORPHINE SULFATE 4 MG/ML
2 INJECTION, SOLUTION INTRAMUSCULAR; INTRAVENOUS EVERY 10 MIN PRN
Status: DISCONTINUED | OUTPATIENT
Start: 2022-11-28 | End: 2022-11-28 | Stop reason: HOSPADM

## 2022-11-28 RX ORDER — MORPHINE SULFATE 4 MG/ML
4 INJECTION, SOLUTION INTRAMUSCULAR; INTRAVENOUS EVERY 10 MIN PRN
Status: DISCONTINUED | OUTPATIENT
Start: 2022-11-28 | End: 2022-11-28 | Stop reason: HOSPADM

## 2022-11-28 RX ORDER — AMLODIPINE BESYLATE 10 MG/1
10 TABLET ORAL DAILY
COMMUNITY
End: 2022-12-06

## 2022-11-28 RX ORDER — ONDANSETRON 2 MG/ML
4 INJECTION INTRAMUSCULAR; INTRAVENOUS EVERY 6 HOURS PRN
Status: DISCONTINUED | OUTPATIENT
Start: 2022-11-28 | End: 2022-12-06

## 2022-11-28 RX ORDER — ROCURONIUM BROMIDE 10 MG/ML
INJECTION, SOLUTION INTRAVENOUS AS NEEDED
Status: DISCONTINUED | OUTPATIENT
Start: 2022-11-28 | End: 2022-11-28 | Stop reason: SURG

## 2022-11-28 RX ORDER — ONDANSETRON 2 MG/ML
4 INJECTION INTRAMUSCULAR; INTRAVENOUS ONCE
Status: COMPLETED | OUTPATIENT
Start: 2022-11-28 | End: 2022-11-28

## 2022-11-28 RX ORDER — SODIUM CHLORIDE, SODIUM LACTATE, POTASSIUM CHLORIDE, CALCIUM CHLORIDE 600; 310; 30; 20 MG/100ML; MG/100ML; MG/100ML; MG/100ML
INJECTION, SOLUTION INTRAVENOUS CONTINUOUS
Status: DISCONTINUED | OUTPATIENT
Start: 2022-11-28 | End: 2022-11-28

## 2022-11-28 RX ORDER — MORPHINE SULFATE 10 MG/ML
6 INJECTION, SOLUTION INTRAMUSCULAR; INTRAVENOUS EVERY 10 MIN PRN
Status: DISCONTINUED | OUTPATIENT
Start: 2022-11-28 | End: 2022-11-28 | Stop reason: HOSPADM

## 2022-11-28 RX ORDER — HYDROMORPHONE HYDROCHLORIDE 1 MG/ML
0.4 INJECTION, SOLUTION INTRAMUSCULAR; INTRAVENOUS; SUBCUTANEOUS EVERY 5 MIN PRN
Status: DISCONTINUED | OUTPATIENT
Start: 2022-11-28 | End: 2022-11-28 | Stop reason: HOSPADM

## 2022-11-28 RX ORDER — MORPHINE SULFATE 4 MG/ML
4 INJECTION, SOLUTION INTRAMUSCULAR; INTRAVENOUS ONCE
Status: COMPLETED | OUTPATIENT
Start: 2022-11-28 | End: 2022-11-28

## 2022-11-28 RX ORDER — SODIUM CHLORIDE, SODIUM LACTATE, POTASSIUM CHLORIDE, CALCIUM CHLORIDE 600; 310; 30; 20 MG/100ML; MG/100ML; MG/100ML; MG/100ML
INJECTION, SOLUTION INTRAVENOUS CONTINUOUS
Status: ACTIVE | OUTPATIENT
Start: 2022-11-28 | End: 2022-11-29

## 2022-11-28 RX ORDER — HYDROMORPHONE HYDROCHLORIDE 1 MG/ML
0.2 INJECTION, SOLUTION INTRAMUSCULAR; INTRAVENOUS; SUBCUTANEOUS EVERY 5 MIN PRN
Status: DISCONTINUED | OUTPATIENT
Start: 2022-11-28 | End: 2022-11-28 | Stop reason: HOSPADM

## 2022-11-28 RX ORDER — LIDOCAINE HYDROCHLORIDE 10 MG/ML
INJECTION, SOLUTION EPIDURAL; INFILTRATION; INTRACAUDAL; PERINEURAL AS NEEDED
Status: DISCONTINUED | OUTPATIENT
Start: 2022-11-28 | End: 2022-11-28 | Stop reason: SURG

## 2022-11-28 RX ORDER — NALOXONE HYDROCHLORIDE 0.4 MG/ML
80 INJECTION, SOLUTION INTRAMUSCULAR; INTRAVENOUS; SUBCUTANEOUS AS NEEDED
Status: DISCONTINUED | OUTPATIENT
Start: 2022-11-28 | End: 2022-11-28 | Stop reason: HOSPADM

## 2022-11-28 RX ORDER — SODIUM CHLORIDE, SODIUM LACTATE, POTASSIUM CHLORIDE, CALCIUM CHLORIDE 600; 310; 30; 20 MG/100ML; MG/100ML; MG/100ML; MG/100ML
INJECTION, SOLUTION INTRAVENOUS CONTINUOUS
Status: ACTIVE | OUTPATIENT
Start: 2022-11-29 | End: 2022-12-02

## 2022-11-28 RX ORDER — ONDANSETRON 2 MG/ML
INJECTION INTRAMUSCULAR; INTRAVENOUS AS NEEDED
Status: DISCONTINUED | OUTPATIENT
Start: 2022-11-28 | End: 2022-11-28 | Stop reason: SURG

## 2022-11-28 RX ORDER — HEPARIN SODIUM 5000 [USP'U]/ML
5000 INJECTION, SOLUTION INTRAVENOUS; SUBCUTANEOUS EVERY 8 HOURS SCHEDULED
Status: DISCONTINUED | OUTPATIENT
Start: 2022-11-28 | End: 2022-12-02

## 2022-11-28 RX ORDER — HYDROMORPHONE HYDROCHLORIDE 1 MG/ML
0.6 INJECTION, SOLUTION INTRAMUSCULAR; INTRAVENOUS; SUBCUTANEOUS EVERY 5 MIN PRN
Status: DISCONTINUED | OUTPATIENT
Start: 2022-11-28 | End: 2022-11-28 | Stop reason: HOSPADM

## 2022-11-28 RX ORDER — SODIUM CHLORIDE, SODIUM LACTATE, POTASSIUM CHLORIDE, CALCIUM CHLORIDE 600; 310; 30; 20 MG/100ML; MG/100ML; MG/100ML; MG/100ML
INJECTION, SOLUTION INTRAVENOUS CONTINUOUS PRN
Status: DISCONTINUED | OUTPATIENT
Start: 2022-11-28 | End: 2022-11-28 | Stop reason: SURG

## 2022-11-28 RX ORDER — SODIUM CHLORIDE 9 MG/ML
INJECTION, SOLUTION INTRAVENOUS CONTINUOUS
Status: DISCONTINUED | OUTPATIENT
Start: 2022-11-28 | End: 2022-11-28

## 2022-11-28 RX ORDER — DEXAMETHASONE SODIUM PHOSPHATE 4 MG/ML
VIAL (ML) INJECTION AS NEEDED
Status: DISCONTINUED | OUTPATIENT
Start: 2022-11-28 | End: 2022-11-28 | Stop reason: SURG

## 2022-11-28 RX ORDER — MORPHINE SULFATE 2 MG/ML
2 INJECTION, SOLUTION INTRAMUSCULAR; INTRAVENOUS EVERY 4 HOURS PRN
Status: DISCONTINUED | OUTPATIENT
Start: 2022-11-28 | End: 2022-12-06

## 2022-11-28 RX ORDER — ONDANSETRON 2 MG/ML
4 INJECTION INTRAMUSCULAR; INTRAVENOUS EVERY 6 HOURS PRN
Status: DISCONTINUED | OUTPATIENT
Start: 2022-11-28 | End: 2022-11-28 | Stop reason: HOSPADM

## 2022-11-28 RX ORDER — KETOROLAC TROMETHAMINE 30 MG/ML
30 INJECTION, SOLUTION INTRAMUSCULAR; INTRAVENOUS ONCE
Status: COMPLETED | OUTPATIENT
Start: 2022-11-29 | End: 2022-11-29

## 2022-11-28 RX ORDER — PROCHLORPERAZINE EDISYLATE 5 MG/ML
5 INJECTION INTRAMUSCULAR; INTRAVENOUS EVERY 8 HOURS PRN
Status: DISCONTINUED | OUTPATIENT
Start: 2022-11-28 | End: 2022-11-28 | Stop reason: HOSPADM

## 2022-11-28 RX ADMIN — SODIUM CHLORIDE, SODIUM LACTATE, POTASSIUM CHLORIDE, CALCIUM CHLORIDE: 600; 310; 30; 20 INJECTION, SOLUTION INTRAVENOUS at 18:52:00

## 2022-11-28 RX ADMIN — LIDOCAINE HYDROCHLORIDE 50 MG: 10 INJECTION, SOLUTION EPIDURAL; INFILTRATION; INTRACAUDAL; PERINEURAL at 17:21:00

## 2022-11-28 RX ADMIN — ROCURONIUM BROMIDE 10 MG: 10 INJECTION, SOLUTION INTRAVENOUS at 17:21:00

## 2022-11-28 RX ADMIN — DEXAMETHASONE SODIUM PHOSPHATE 4 MG: 4 MG/ML VIAL (ML) INJECTION at 17:21:00

## 2022-11-28 RX ADMIN — SODIUM CHLORIDE, SODIUM LACTATE, POTASSIUM CHLORIDE, CALCIUM CHLORIDE: 600; 310; 30; 20 INJECTION, SOLUTION INTRAVENOUS at 17:17:00

## 2022-11-28 RX ADMIN — ONDANSETRON 4 MG: 2 INJECTION INTRAMUSCULAR; INTRAVENOUS at 17:21:00

## 2022-11-28 NOTE — PLAN OF CARE
Patient alert and oriented x4. Vss. On room air. Sclera yellow. NPO. Morphine prn for pain control. IVF and K replaced as ordered. Bed locked and in lowest position. Call light within reach. Addendum: ERCP today. Consent signed.    Problem: Patient Centered Care  Goal: Patient preferences are identified and integrated in the patient's plan of care  Description: Interventions:  - What would you like us to know as we care for you?   - Provide timely, complete, and accurate information to patient/family  - Incorporate patient and family knowledge, values, beliefs, and cultural backgrounds into the planning and delivery of care  - Encourage patient/family to participate in care and decision-making at the level they choose  - Honor patient and family perspectives and choices  Outcome: Progressing     Problem: Patient/Family Goals  Goal: Patient/Family Long Term Goal  Description: Patient's Long Term Goal:     Interventions:  -   - See additional Care Plan goals for specific interventions  Outcome: Progressing  Goal: Patient/Family Short Term Goal  Description: Patient's Short Term Goal:     Interventions:   -   - See additional Care Plan goals for specific interventions  Outcome: Progressing     Problem: PAIN - ADULT  Goal: Verbalizes/displays adequate comfort level or patient's stated pain goal  Description: INTERVENTIONS:  - Encourage pt to monitor pain and request assistance  - Assess pain using appropriate pain scale  - Administer analgesics based on type and severity of pain and evaluate response  - Implement non-pharmacological measures as appropriate and evaluate response  - Consider cultural and social influences on pain and pain management  - Manage/alleviate anxiety  - Utilize distraction and/or relaxation techniques  - Monitor for opioid side effects  - Notify MD/LIP if interventions unsuccessful or patient reports new pain  - Anticipate increased pain with activity and pre-medicate as appropriate  Outcome: Progressing     Problem: GASTROINTESTINAL - ADULT  Goal: Minimal or absence of nausea and vomiting  Description: INTERVENTIONS:  - Maintain adequate hydration with IV or PO as ordered and tolerated  - Nasogastric tube to low intermittent suction as ordered  - Evaluate effectiveness of ordered antiemetic medications  - Provide nonpharmacologic comfort measures as appropriate  - Advance diet as tolerated, if ordered  - Obtain nutritional consult as needed  - Evaluate fluid balance  Outcome: Progressing  Goal: Maintains or returns to baseline bowel function  Description: INTERVENTIONS:  - Assess bowel function  - Maintain adequate hydration with IV or PO as ordered and tolerated  - Evaluate effectiveness of GI medications  - Encourage mobilization and activity  - Obtain nutritional consult as needed  - Establish a toileting routine/schedule  - Consider collaborating with pharmacy to review patient's medication profile  Outcome: Progressing

## 2022-11-28 NOTE — ED INITIAL ASSESSMENT (HPI)
Patient to ER from home with c/o ruq abdominal pain X2 hours. +nausea. History of gall stones no surgery.

## 2022-11-28 NOTE — PLAN OF CARE
Problem: Patient Centered Care  Goal: Patient preferences are identified and integrated in the patient's plan of care  Description: Interventions:  - What would you like us to know as we care for you? I'm having a gallbladder attack.   - Provide timely, complete, and accurate information to patient/family  - Incorporate patient and family knowledge, values, beliefs, and cultural backgrounds into the planning and delivery of care  - Encourage patient/family to participate in care and decision-making at the level they choose  - Honor patient and family perspectives and choices  Outcome: Progressing     Problem: Patient/Family Goals  Goal: Patient/Family Long Term Goal  Description: Patient's Long Term Goal:     Interventions:  -   - See additional Care Plan goals for specific interventions  Outcome: Progressing  Goal: Patient/Family Short Term Goal  Description: Patient's Short Term Goal:     Interventions:   -   - See additional Care Plan goals for specific interventions  Outcome: Progressing     Problem: PAIN - ADULT  Goal: Verbalizes/displays adequate comfort level or patient's stated pain goal  Description: INTERVENTIONS:  - Encourage pt to monitor pain and request assistance  - Assess pain using appropriate pain scale  - Administer analgesics based on type and severity of pain and evaluate response  - Implement non-pharmacological measures as appropriate and evaluate response  - Consider cultural and social influences on pain and pain management  - Manage/alleviate anxiety  - Utilize distraction and/or relaxation techniques  - Monitor for opioid side effects  - Notify MD/LIP if interventions unsuccessful or patient reports new pain  - Anticipate increased pain with activity and pre-medicate as appropriate  Outcome: Progressing     Problem: GASTROINTESTINAL - ADULT  Goal: Minimal or absence of nausea and vomiting  Description: INTERVENTIONS:  - Maintain adequate hydration with IV or PO as ordered and tolerated  - Nasogastric tube to low intermittent suction as ordered  - Evaluate effectiveness of ordered antiemetic medications  - Provide nonpharmacologic comfort measures as appropriate  - Advance diet as tolerated, if ordered  - Obtain nutritional consult as needed  - Evaluate fluid balance  Outcome: Progressing  Goal: Maintains or returns to baseline bowel function  Description: INTERVENTIONS:  - Assess bowel function  - Maintain adequate hydration with IV or PO as ordered and tolerated  - Evaluate effectiveness of GI medications  - Encourage mobilization and activity  - Obtain nutritional consult as needed  - Establish a toileting routine/schedule  - Consider collaborating with pharmacy to review patient's medication profile  Outcome: Progressing     Received patient from ED, accompanied by her son. Oriented to unit routine. Discussed plan of care. IV fluids started and patient was instructed to be on strict NPO for now. Voiding freely. Medicated with IV Morphine for pain. Safety measures in place. Continue to monitor.

## 2022-11-28 NOTE — ANESTHESIA PROCEDURE NOTES
Airway  Date/Time: 11/28/2022 5:22 PM  Urgency: elective    Airway not difficult    General Information and Staff    Patient location during procedure: endo  Resident/CRNA: Den Santos CRNA  Performed: CRNA     Indications and Patient Condition  Indications for airway management: airway protection and anesthesia  Spontaneous Ventilation: absent  Sedation level: deep  Preoxygenated: yes  MILS maintained throughout  Mask difficulty assessment: 0 - not attempted    Final Airway Details  Final airway type: endotracheal airway      Successful airway: ETT     Successful intubation technique: direct laryngoscopy  Blade: Davey  Blade size: #3  ETT size (mm): 7.0    Cormack-Lehane Classification: grade I - full view of glottis  Measured from: lips  ETT to lips (cm): 21  Number of attempts at approach: 1  Number of other approaches attempted: 0

## 2022-11-28 NOTE — ED QUICK NOTES
Orders for admission, patient is aware of plan and ready to go upstairs. Any questions, please call ED [facility] at extension [phone].      Patient Covid vaccination status: Unvaccinated     COVID Test Ordered in ED: Rapid SARS-CoV-2 by PCR    COVID Suspicion at Admission: Low clinical suspicion for COVID    Running Infusions:      Mental Status/LOC at time of transport: aox4    Other pertinent information:   CIWA score: N/A   NIH score:  N/A

## 2022-11-29 LAB
ALBUMIN SERPL-MCNC: 2.3 G/DL (ref 3.4–5)
ALP LIVER SERPL-CCNC: 562 U/L
ALT SERPL-CCNC: 103 U/L
ANION GAP SERPL CALC-SCNC: 6 MMOL/L (ref 0–18)
AST SERPL-CCNC: 105 U/L (ref 15–37)
BASOPHILS # BLD AUTO: 0.01 X10(3) UL (ref 0–0.2)
BASOPHILS NFR BLD AUTO: 0.1 %
BILIRUB DIRECT SERPL-MCNC: 10.6 MG/DL (ref 0–0.2)
BILIRUB SERPL-MCNC: 13.1 MG/DL (ref 0.1–2)
BUN BLD-MCNC: 25 MG/DL (ref 7–18)
BUN/CREAT SERPL: 20.2 (ref 10–20)
CALCIUM BLD-MCNC: 9.3 MG/DL (ref 8.5–10.1)
CHLORIDE SERPL-SCNC: 101 MMOL/L (ref 98–112)
CO2 SERPL-SCNC: 28 MMOL/L (ref 21–32)
CREAT BLD-MCNC: 1.24 MG/DL
DEPRECATED RDW RBC AUTO: 48.5 FL (ref 35.1–46.3)
EOSINOPHIL # BLD AUTO: 0 X10(3) UL (ref 0–0.7)
EOSINOPHIL NFR BLD AUTO: 0 %
ERYTHROCYTE [DISTWIDTH] IN BLOOD BY AUTOMATED COUNT: 13.7 % (ref 11–15)
GFR SERPLBLD BASED ON 1.73 SQ M-ARVRAT: 52 ML/MIN/1.73M2 (ref 60–?)
GLUCOSE BLD-MCNC: 109 MG/DL (ref 70–99)
HCT VFR BLD AUTO: 35.9 %
HGB BLD-MCNC: 11.5 G/DL
IMM GRANULOCYTES # BLD AUTO: 0.05 X10(3) UL (ref 0–1)
IMM GRANULOCYTES NFR BLD: 0.5 %
LYMPHOCYTES # BLD AUTO: 1.14 X10(3) UL (ref 1–4)
LYMPHOCYTES NFR BLD AUTO: 11.2 %
MCH RBC QN AUTO: 30.5 PG (ref 26–34)
MCHC RBC AUTO-ENTMCNC: 32 G/DL (ref 31–37)
MCV RBC AUTO: 95.2 FL
MONOCYTES # BLD AUTO: 0.55 X10(3) UL (ref 0.1–1)
MONOCYTES NFR BLD AUTO: 5.4 %
NEUTROPHILS # BLD AUTO: 8.39 X10 (3) UL (ref 1.5–7.7)
NEUTROPHILS # BLD AUTO: 8.39 X10(3) UL (ref 1.5–7.7)
NEUTROPHILS NFR BLD AUTO: 82.8 %
OSMOLALITY SERPL CALC.SUM OF ELEC: 285 MOSM/KG (ref 275–295)
PLATELET # BLD AUTO: 232 10(3)UL (ref 150–450)
POTASSIUM SERPL-SCNC: 3.8 MMOL/L (ref 3.5–5.1)
POTASSIUM SERPL-SCNC: 3.8 MMOL/L (ref 3.5–5.1)
PROT SERPL-MCNC: 8 G/DL (ref 6.4–8.2)
RBC # BLD AUTO: 3.77 X10(6)UL
SODIUM SERPL-SCNC: 135 MMOL/L (ref 136–145)
WBC # BLD AUTO: 10.1 X10(3) UL (ref 4–11)

## 2022-11-29 PROCEDURE — 99232 SBSQ HOSP IP/OBS MODERATE 35: CPT | Performed by: INTERNAL MEDICINE

## 2022-11-29 PROCEDURE — 99232 SBSQ HOSP IP/OBS MODERATE 35: CPT | Performed by: SURGERY

## 2022-11-29 RX ORDER — AMLODIPINE BESYLATE 10 MG/1
10 TABLET ORAL DAILY
Status: DISCONTINUED | OUTPATIENT
Start: 2022-11-29 | End: 2022-12-06

## 2022-11-29 RX ORDER — POTASSIUM CHLORIDE 14.9 MG/ML
20 INJECTION INTRAVENOUS ONCE
Status: COMPLETED | OUTPATIENT
Start: 2022-11-29 | End: 2022-11-29

## 2022-11-29 NOTE — PLAN OF CARE
Alert, up in room independently. Eager to advance diet, \"I haven't had real food in seven days and I am hungry. \" Will continue to plan with MD. Plan for surgery this admission, awaiting orders. Denies nausea, denies pain. No BM yet today. Tolerating clear liquid diet. IV fluids infusing. Will continue to monitor. Problem: Patient Centered Care  Goal: Patient preferences are identified and integrated in the patient's plan of care  Description: Interventions:  - What would you like us to know as we care for you?  From home with family  - Provide timely, complete, and accurate information to patient/family  - Incorporate patient and family knowledge, values, beliefs, and cultural backgrounds into the planning and delivery of care  - Encourage patient/family to participate in care and decision-making at the level they choose  - Honor patient and family perspectives and choices  11/29/2022 1052 by Leighann Lamar RN  Outcome: Progressing  11/29/2022 1052 by Leighann Lamar RN  Outcome: Progressing     Problem: Patient/Family Goals  Goal: Patient/Family Long Term Goal  Description: Patient's Long Term Goal: To manage condition, get better, and go home    Interventions:  -Monitor labs, results, and vitals  -Administer medication as prescribed and PRN  -Pain management  -Infection management and prevention  -Replace electrolyte per protocol  -I&Os  -Safety, diet, act katlyn, hygiene  -IVF  -IV abx  -Imaging/tests/procedures  -Follow plan of care  -Monitor for worsening condition or new onset of symptoms  - See additional Care Plan goals for specific interventions  11/29/2022 1052 by Leighann Lamar RN  Outcome: Progressing  11/29/2022 1052 by Leighann Lamar RN  Outcome: Progressing  Goal: Patient/Family Short Term Goal  Description: Patient's Short Term Goal: Pain and nausea management    Interventions:   -Assess pain and nausea level  -Encourage pt to notify of increasing pain and nausea  -Administer pain medication as prescribed and prn  -Provide non-pharmacological intervention as needed  -Follow plan of care  - See additional Care Plan goals for specific interventions  11/29/2022 1052 by General Lorenzo RN  Outcome: Progressing  11/29/2022 1052 by General Lorenzo RN  Outcome: Progressing     Problem: PAIN - ADULT  Goal: Verbalizes/displays adequate comfort level or patient's stated pain goal  Description: INTERVENTIONS:  - Encourage pt to monitor pain and request assistance  - Assess pain using appropriate pain scale  - Administer analgesics based on type and severity of pain and evaluate response  - Implement non-pharmacological measures as appropriate and evaluate response  - Consider cultural and social influences on pain and pain management  - Manage/alleviate anxiety  - Utilize distraction and/or relaxation techniques  - Monitor for opioid side effects  - Notify MD/LIP if interventions unsuccessful or patient reports new pain  - Anticipate increased pain with activity and pre-medicate as appropriate  11/29/2022 1052 by General Lorenzo RN  Outcome: Progressing  11/29/2022 1052 by General Lorenzo RN  Outcome: Progressing     Problem: GASTROINTESTINAL - ADULT  Goal: Minimal or absence of nausea and vomiting  Description: INTERVENTIONS:  - Maintain adequate hydration with IV or PO as ordered and tolerated  - Nasogastric tube to low intermittent suction as ordered  - Evaluate effectiveness of ordered antiemetic medications  - Provide nonpharmacologic comfort measures as appropriate  - Advance diet as tolerated, if ordered  - Obtain nutritional consult as needed  - Evaluate fluid balance  11/29/2022 1052 by General Lorenzo RN  Outcome: Progressing  11/29/2022 1052 by General Lorenzo RN  Outcome: Progressing  Goal: Maintains or returns to baseline bowel function  Description: INTERVENTIONS:  - Assess bowel function  - Maintain adequate hydration with IV or PO as ordered and tolerated  - Evaluate effectiveness of GI medications  - Encourage mobilization and activity  - Obtain nutritional consult as needed  - Establish a toileting routine/schedule  - Consider collaborating with pharmacy to review patient's medication profile  11/29/2022 1052 by Meg Kim RN  Outcome: Progressing  11/29/2022 1052 by Meg Kim RN  Outcome: Progressing     Problem: SAFETY ADULT - FALL  Goal: Free from fall injury  Description: INTERVENTIONS:  - Assess pt frequently for physical needs  - Identify cognitive and physical deficits and behaviors that affect risk of falls.   - [place] fall precautions as indicated by assessment.  - Educate pt/family on patient safety including physical limitations  - Instruct pt to call for assistance with activity based on assessment  - Modify environment to reduce risk of injury  - Provide assistive devices as appropriate  - Consider OT/PT consult to assist with strengthening/mobility  - Encourage toileting schedule  11/29/2022 1052 by Meg Kim RN  Outcome: Progressing  11/29/2022 1052 by Meg Kim RN  Outcome: Progressing     Problem: DISCHARGE PLANNING  Goal: Discharge to home or other facility with appropriate resources  Description: INTERVENTIONS:  - Identify barriers to discharge w/pt and caregiver  - Include patient/family/discharge partner in discharge planning  - Arrange for needed discharge resources and transportation as appropriate  - Identify discharge learning needs (meds, wound care, etc)  - Arrange for interpreters to assist at discharge as needed  - Consider post-discharge preferences of patient/family/discharge partner  - Complete POLST form as appropriate  - Assess patient's ability to be responsible for managing their own health  - Refer to Case Management Department for coordinating discharge planning if the patient needs post-hospital services based on physician/LIP order or complex needs related to functional status, cognitive ability or social support system  11/29/2022 1052 by Bernie Holman RN  Outcome: Progressing  11/29/2022 1052 by Bernie Holman RN  Outcome: Progressing     Problem: METABOLIC/FLUID AND ELECTROLYTES - ADULT  Goal: Electrolytes maintained within normal limits  Description: INTERVENTIONS:  - Monitor labs and rhythm and assess patient for signs and symptoms of electrolyte imbalances  - Administer electrolyte replacement as ordered  - Monitor response to electrolyte replacements, including rhythm and repeat lab results as appropriate  - Fluid restriction as ordered  - Instruct patient on fluid and nutrition restrictions as appropriate  11/29/2022 1052 by Bernie Holman RN  Outcome: Progressing  11/29/2022 1052 by Bernie Holman RN  Outcome: Progressing

## 2022-11-29 NOTE — PLAN OF CARE
Patient is alert and oriented x4, on RA. Getting heparin subcutaneous. On clear liquid diet. Voiding, up to bathroom, 1x-assist/self. No complaints of pain or nausea. Gave 1x dose of Toradol per order. IV zosyn abx. IVF infusing with LR at 200 ml/hr. Plan pending, will continue to monitor. Problem: Patient Centered Care  Goal: Patient preferences are identified and integrated in the patient's plan of care  Description: Interventions:  - What would you like us to know as we care for you?  From home with family  - Provide timely, complete, and accurate information to patient/family  - Incorporate patient and family knowledge, values, beliefs, and cultural backgrounds into the planning and delivery of care  - Encourage patient/family to participate in care and decision-making at the level they choose  - Honor patient and family perspectives and choices  Outcome: Progressing     Problem: Patient/Family Goals  Goal: Patient/Family Long Term Goal  Description: Patient's Long Term Goal: To manage condition, get better, and go home    Interventions:  -Monitor labs, results, and vitals  -Administer medication as prescribed and PRN  -Pain management  -Infection management and prevention  -Replace electrolyte per protocol  -I&Os  -Safety, diet, act katlyn, hygiene  -IVF  -IV abx  -Imaging/tests/procedures  -Follow plan of care  -Monitor for worsening condition or new onset of symptoms  - See additional Care Plan goals for specific interventions  Outcome: Progressing  Goal: Patient/Family Short Term Goal  Description: Patient's Short Term Goal: Pain and nausea management    Interventions:   -Assess pain and nausea level  -Encourage pt to notify of increasing pain and nausea  -Administer pain medication as prescribed and prn  -Provide non-pharmacological intervention as needed  -Follow plan of care  - See additional Care Plan goals for specific interventions  Outcome: Progressing     Problem: PAIN - ADULT  Goal: Verbalizes/displays adequate comfort level or patient's stated pain goal  Description: INTERVENTIONS:  - Encourage pt to monitor pain and request assistance  - Assess pain using appropriate pain scale  - Administer analgesics based on type and severity of pain and evaluate response  - Implement non-pharmacological measures as appropriate and evaluate response  - Consider cultural and social influences on pain and pain management  - Manage/alleviate anxiety  - Utilize distraction and/or relaxation techniques  - Monitor for opioid side effects  - Notify MD/LIP if interventions unsuccessful or patient reports new pain  - Anticipate increased pain with activity and pre-medicate as appropriate  Outcome: Progressing     Problem: GASTROINTESTINAL - ADULT  Goal: Minimal or absence of nausea and vomiting  Description: INTERVENTIONS:  - Maintain adequate hydration with IV or PO as ordered and tolerated  - Nasogastric tube to low intermittent suction as ordered  - Evaluate effectiveness of ordered antiemetic medications  - Provide nonpharmacologic comfort measures as appropriate  - Advance diet as tolerated, if ordered  - Obtain nutritional consult as needed  - Evaluate fluid balance  Outcome: Progressing  Goal: Maintains or returns to baseline bowel function  Description: INTERVENTIONS:  - Assess bowel function  - Maintain adequate hydration with IV or PO as ordered and tolerated  - Evaluate effectiveness of GI medications  - Encourage mobilization and activity  - Obtain nutritional consult as needed  - Establish a toileting routine/schedule  - Consider collaborating with pharmacy to review patient's medication profile  Outcome: Progressing     Problem: RISK FOR INFECTION - ADULT  Goal: Absence of fever/infection during anticipated neutropenic period  Description: INTERVENTIONS  - Monitor WBC  - Administer growth factors as ordered  - Implement neutropenic guidelines  Outcome: Progressing     Problem: SAFETY ADULT - FALL  Goal: Free from fall injury  Description: INTERVENTIONS:  - Assess pt frequently for physical needs  - Identify cognitive and physical deficits and behaviors that affect risk of falls.   - [place] fall precautions as indicated by assessment.  - Educate pt/family on patient safety including physical limitations  - Instruct pt to call for assistance with activity based on assessment  - Modify environment to reduce risk of injury  - Provide assistive devices as appropriate  - Consider OT/PT consult to assist with strengthening/mobility  - Encourage toileting schedule  Outcome: Progressing     Problem: METABOLIC/FLUID AND ELECTROLYTES - ADULT  Goal: Electrolytes maintained within normal limits  Description: INTERVENTIONS:  - Monitor labs and rhythm and assess patient for signs and symptoms of electrolyte imbalances  - Administer electrolyte replacement as ordered  - Monitor response to electrolyte replacements, including rhythm and repeat lab results as appropriate  - Fluid restriction as ordered  - Instruct patient on fluid and nutrition restrictions as appropriate  Outcome: Progressing

## 2022-11-29 NOTE — ADDENDUM NOTE
Addendum  created 11/28/22 1911 by Barber Avery MD    Order list changed, Order sets accessed, Pharmacy for encounter modified

## 2022-11-29 NOTE — ANESTHESIA POSTPROCEDURE EVALUATION
Patient: Nicho Franco    Procedure Summary     Date: 11/28/22 Room / Location: [address] [place] [address] ENDOSCOPY 01 / [address] [place] [address] ENDOSCOPY    Anesthesia Start: 9024 Anesthesia Stop: 1904    Procedure: ENDOSCOPIC RETROGRADE CHOLANGIOPANCREATOGRAPHY (ERCP) Diagnosis: (Mirizzi's syndrome)    Surgeons: Jasmin Noriega MD Anesthesiologist: Maria Ines Lundberg CRNA    Anesthesia Type: general ASA Status: 2          Anesthesia Type: general    Vitals Value Taken Time   /79 11/28/22 1904   Temp  11/28/22 1904   Pulse 109 11/28/22 1904   Resp 14 11/28/22 1904   SpO2 99 % 11/28/22 1904   Vitals shown include unvalidated device data.     [address] [place] [address] AN Post Evaluation:   Patient Evaluated in PACU  Patient Participation: complete - patient participated  Level of Consciousness: awake  Pain Management: adequate  Airway Patency:patent  Yes    Cardiovascular Status: acceptable  Respiratory Status: acceptable  Postoperative Hydration acceptable      Jose Guzman MD  11/28/2022 7:04 PM

## 2022-11-30 LAB
ALBUMIN SERPL-MCNC: 2 G/DL (ref 3.4–5)
ALBUMIN/GLOB SERPL: 0.4 {RATIO} (ref 1–2)
ALP LIVER SERPL-CCNC: 489 U/L
ALT SERPL-CCNC: 96 U/L
ANION GAP SERPL CALC-SCNC: 5 MMOL/L (ref 0–18)
AST SERPL-CCNC: 113 U/L (ref 15–37)
BILIRUB SERPL-MCNC: 9.8 MG/DL (ref 0.1–2)
BUN BLD-MCNC: 18 MG/DL (ref 7–18)
BUN/CREAT SERPL: 20.5 (ref 10–20)
CALCIUM BLD-MCNC: 8.5 MG/DL (ref 8.5–10.1)
CHLORIDE SERPL-SCNC: 106 MMOL/L (ref 98–112)
CO2 SERPL-SCNC: 28 MMOL/L (ref 21–32)
CREAT BLD-MCNC: 0.88 MG/DL
GFR SERPLBLD BASED ON 1.73 SQ M-ARVRAT: 78 ML/MIN/1.73M2 (ref 60–?)
GLOBULIN PLAS-MCNC: 5.4 G/DL (ref 2.8–4.4)
GLUCOSE BLD-MCNC: 91 MG/DL (ref 70–99)
OSMOLALITY SERPL CALC.SUM OF ELEC: 289 MOSM/KG (ref 275–295)
POTASSIUM SERPL-SCNC: 4.2 MMOL/L (ref 3.5–5.1)
POTASSIUM SERPL-SCNC: 4.2 MMOL/L (ref 3.5–5.1)
PROT SERPL-MCNC: 7.4 G/DL (ref 6.4–8.2)
SODIUM SERPL-SCNC: 139 MMOL/L (ref 136–145)

## 2022-11-30 RX ORDER — HYDROCODONE BITARTRATE AND ACETAMINOPHEN 5; 325 MG/1; MG/1
1 TABLET ORAL EVERY 6 HOURS PRN
Status: DISCONTINUED | OUTPATIENT
Start: 2022-11-30 | End: 2022-12-06

## 2022-11-30 NOTE — PLAN OF CARE
Patient is alert and oriented x4, on RA. Getting heparin subcutaneous. On cardiac diet. Voiding, up to bathroom, 1x-assist/self. No complaints of nausea. Was starting to have pain that intensify with movement, ambulation, and sharp in abdomen, pt believes food could have contributed to it. Morphine Prn given, did not help, paged MD for new orders, gave Norco PRN, gave hot packs. IV zosyn abx. IVF infusing with LR at 125 ml/hr. Finished up K replacement during shift. Plan pending, will continue to monitor. Problem: Patient Centered Care  Goal: Patient preferences are identified and integrated in the patient's plan of care  Description: Interventions:  - What would you like us to know as we care for you?  From home with family  - Provide timely, complete, and accurate information to patient/family  - Incorporate patient and family knowledge, values, beliefs, and cultural backgrounds into the planning and delivery of care  - Encourage patient/family to participate in care and decision-making at the level they choose  - Honor patient and family perspectives and choices  Outcome: Progressing     Problem: Patient/Family Goals  Goal: Patient/Family Long Term Goal  Description: Patient's Long Term Goal: To manage condition, get better, and go home    Interventions:  -Monitor labs, results, and vitals  -Administer medication as prescribed and PRN  -Pain management  -Infection management and prevention  -Replace electrolyte per protocol  -I&Os  -Safety, diet, act katlyn, hygiene  -IVF  -IV abx  -Imaging/tests/procedures  -Follow plan of care  -Monitor for worsening condition or new onset of symptoms  - See additional Care Plan goals for specific interventions  Outcome: Progressing  Goal: Patient/Family Short Term Goal  Description: Patient's Short Term Goal: Pain and nausea management    Interventions:   -Assess pain and nausea level  -Encourage pt to notify of increasing pain and nausea  -Administer pain medication as prescribed and prn  -Provide non-pharmacological intervention as needed  -Follow plan of care  - See additional Care Plan goals for specific interventions  Outcome: Progressing     Problem: PAIN - ADULT  Goal: Verbalizes/displays adequate comfort level or patient's stated pain goal  Description: INTERVENTIONS:  - Encourage pt to monitor pain and request assistance  - Assess pain using appropriate pain scale  - Administer analgesics based on type and severity of pain and evaluate response  - Implement non-pharmacological measures as appropriate and evaluate response  - Consider cultural and social influences on pain and pain management  - Manage/alleviate anxiety  - Utilize distraction and/or relaxation techniques  - Monitor for opioid side effects  - Notify MD/LIP if interventions unsuccessful or patient reports new pain  - Anticipate increased pain with activity and pre-medicate as appropriate  Outcome: Progressing     Problem: GASTROINTESTINAL - ADULT  Goal: Minimal or absence of nausea and vomiting  Description: INTERVENTIONS:  - Maintain adequate hydration with IV or PO as ordered and tolerated  - Nasogastric tube to low intermittent suction as ordered  - Evaluate effectiveness of ordered antiemetic medications  - Provide nonpharmacologic comfort measures as appropriate  - Advance diet as tolerated, if ordered  - Obtain nutritional consult as needed  - Evaluate fluid balance  Outcome: Progressing  Goal: Maintains or returns to baseline bowel function  Description: INTERVENTIONS:  - Assess bowel function  - Maintain adequate hydration with IV or PO as ordered and tolerated  - Evaluate effectiveness of GI medications  - Encourage mobilization and activity  - Obtain nutritional consult as needed  - Establish a toileting routine/schedule  - Consider collaborating with pharmacy to review patient's medication profile  Outcome: Progressing     Problem: SAFETY ADULT - FALL  Goal: Free from fall injury  Description: INTERVENTIONS:  - Assess pt frequently for physical needs  - Identify cognitive and physical deficits and behaviors that affect risk of falls.   - [place] fall precautions as indicated by assessment.  - Educate pt/family on patient safety including physical limitations  - Instruct pt to call for assistance with activity based on assessment  - Modify environment to reduce risk of injury  - Provide assistive devices as appropriate  - Consider OT/PT consult to assist with strengthening/mobility  - Encourage toileting schedule  Outcome: Progressing     Problem: METABOLIC/FLUID AND ELECTROLYTES - ADULT  Goal: Electrolytes maintained within normal limits  Description: INTERVENTIONS:  - Monitor labs and rhythm and assess patient for signs and symptoms of electrolyte imbalances  - Administer electrolyte replacement as ordered  - Monitor response to electrolyte replacements, including rhythm and repeat lab results as appropriate  - Fluid restriction as ordered  - Instruct patient on fluid and nutrition restrictions as appropriate  Outcome: Progressing

## 2022-12-01 LAB
ALBUMIN SERPL-MCNC: 2 G/DL (ref 3.4–5)
ALBUMIN/GLOB SERPL: 0.4 {RATIO} (ref 1–2)
ALP LIVER SERPL-CCNC: 489 U/L
ALT SERPL-CCNC: 95 U/L
ANION GAP SERPL CALC-SCNC: 6 MMOL/L (ref 0–18)
AST SERPL-CCNC: 110 U/L (ref 15–37)
BILIRUB SERPL-MCNC: 10.1 MG/DL (ref 0.1–2)
BUN BLD-MCNC: 11 MG/DL (ref 7–18)
BUN/CREAT SERPL: 14.1 (ref 10–20)
CALCIUM BLD-MCNC: 8.6 MG/DL (ref 8.5–10.1)
CHLORIDE SERPL-SCNC: 100 MMOL/L (ref 98–112)
CO2 SERPL-SCNC: 30 MMOL/L (ref 21–32)
CREAT BLD-MCNC: 0.78 MG/DL
DEPRECATED RDW RBC AUTO: 47 FL (ref 35.1–46.3)
ERYTHROCYTE [DISTWIDTH] IN BLOOD BY AUTOMATED COUNT: 13.7 % (ref 11–15)
GFR SERPLBLD BASED ON 1.73 SQ M-ARVRAT: 90 ML/MIN/1.73M2 (ref 60–?)
GLOBULIN PLAS-MCNC: 5.4 G/DL (ref 2.8–4.4)
GLUCOSE BLD-MCNC: 88 MG/DL (ref 70–99)
HCT VFR BLD AUTO: 32.9 %
HGB BLD-MCNC: 10.7 G/DL
MCH RBC QN AUTO: 30.6 PG (ref 26–34)
MCHC RBC AUTO-ENTMCNC: 32.5 G/DL (ref 31–37)
MCV RBC AUTO: 94 FL
OSMOLALITY SERPL CALC.SUM OF ELEC: 281 MOSM/KG (ref 275–295)
PLATELET # BLD AUTO: 250 10(3)UL (ref 150–450)
POTASSIUM SERPL-SCNC: 3.9 MMOL/L (ref 3.5–5.1)
PROT SERPL-MCNC: 7.4 G/DL (ref 6.4–8.2)
RBC # BLD AUTO: 3.5 X10(6)UL
SODIUM SERPL-SCNC: 136 MMOL/L (ref 136–145)
WBC # BLD AUTO: 8.6 X10(3) UL (ref 4–11)

## 2022-12-01 NOTE — PLAN OF CARE
No acute changes overnight. Up ad portia. Norco PRN for pain management. Voiding freely. Tolerating diet. IVF/IV abx as ordered/scheduled. Problem: Patient Centered Care  Goal: Patient preferences are identified and integrated in the patient's plan of care  Description: Interventions:  - What would you like us to know as we care for you?  From home with family  - Provide timely, complete, and accurate information to patient/family  - Incorporate patient and family knowledge, values, beliefs, and cultural backgrounds into the planning and delivery of care  - Encourage patient/family to participate in care and decision-making at the level they choose  - Honor patient and family perspectives and choices  Outcome: Progressing     Problem: Patient/Family Goals  Goal: Patient/Family Long Term Goal  Description: Patient's Long Term Goal: To manage condition, get better, and go home    Interventions:  -Monitor labs, results, and vitals  -Administer medication as prescribed and PRN  -Pain management  -Infection management and prevention  -Replace electrolyte per protocol  -I&Os  -Safety, diet, act katlyn, hygiene  -IVF  -IV abx  -Imaging/tests/procedures  -Follow plan of care  -Monitor for worsening condition or new onset of symptoms  - See additional Care Plan goals for specific interventions  Outcome: Progressing  Goal: Patient/Family Short Term Goal  Description: Patient's Short Term Goal: Pain and nausea management    Interventions:   -Assess pain and nausea level  -Encourage pt to notify of increasing pain and nausea  -Administer pain medication as prescribed and prn  -Provide non-pharmacological intervention as needed  -Follow plan of care  - See additional Care Plan goals for specific interventions  Outcome: Progressing     Problem: PAIN - ADULT  Goal: Verbalizes/displays adequate comfort level or patient's stated pain goal  Description: INTERVENTIONS:  - Encourage pt to monitor pain and request assistance  - Assess pain using appropriate pain scale  - Administer analgesics based on type and severity of pain and evaluate response  - Implement non-pharmacological measures as appropriate and evaluate response  - Consider cultural and social influences on pain and pain management  - Manage/alleviate anxiety  - Utilize distraction and/or relaxation techniques  - Monitor for opioid side effects  - Notify MD/LIP if interventions unsuccessful or patient reports new pain  - Anticipate increased pain with activity and pre-medicate as appropriate  Outcome: Progressing     Problem: GASTROINTESTINAL - ADULT  Goal: Minimal or absence of nausea and vomiting  Description: INTERVENTIONS:  - Maintain adequate hydration with IV or PO as ordered and tolerated  - Nasogastric tube to low intermittent suction as ordered  - Evaluate effectiveness of ordered antiemetic medications  - Provide nonpharmacologic comfort measures as appropriate  - Advance diet as tolerated, if ordered  - Obtain nutritional consult as needed  - Evaluate fluid balance  Outcome: Progressing  Goal: Maintains or returns to baseline bowel function  Description: INTERVENTIONS:  - Assess bowel function  - Maintain adequate hydration with IV or PO as ordered and tolerated  - Evaluate effectiveness of GI medications  - Encourage mobilization and activity  - Obtain nutritional consult as needed  - Establish a toileting routine/schedule  - Consider collaborating with pharmacy to review patient's medication profile  Outcome: Progressing     Problem: SAFETY ADULT - FALL  Goal: Free from fall injury  Description: INTERVENTIONS:  - Assess pt frequently for physical needs  - Identify cognitive and physical deficits and behaviors that affect risk of falls.   - [place] fall precautions as indicated by assessment.  - Educate pt/family on patient safety including physical limitations  - Instruct pt to call for assistance with activity based on assessment  - Modify environment to reduce risk of injury  - Provide assistive devices as appropriate  - Consider OT/PT consult to assist with strengthening/mobility  - Encourage toileting schedule  Outcome: Progressing     Problem: DISCHARGE PLANNING  Goal: Discharge to home or other facility with appropriate resources  Description: INTERVENTIONS:  - Identify barriers to discharge w/pt and caregiver  - Include patient/family/discharge partner in discharge planning  - Arrange for needed discharge resources and transportation as appropriate  - Identify discharge learning needs (meds, wound care, etc)  - Arrange for interpreters to assist at discharge as needed  - Consider post-discharge preferences of patient/family/discharge partner  - Complete POLST form as appropriate  - Assess patient's ability to be responsible for managing their own health  - Refer to Case Management Department for coordinating discharge planning if the patient needs post-hospital services based on physician/LIP order or complex needs related to functional status, cognitive ability or social support system  Outcome: Progressing     Problem: METABOLIC/FLUID AND ELECTROLYTES - ADULT  Goal: Electrolytes maintained within normal limits  Description: INTERVENTIONS:  - Monitor labs and rhythm and assess patient for signs and symptoms of electrolyte imbalances  - Administer electrolyte replacement as ordered  - Monitor response to electrolyte replacements, including rhythm and repeat lab results as appropriate  - Fluid restriction as ordered  - Instruct patient on fluid and nutrition restrictions as appropriate  Outcome: Progressing

## 2022-12-01 NOTE — PLAN OF CARE
Plan of care reviewed with Maikel Boyle. Patient afebrile today. Tolerating diet. Norco given for pain management with good response. IVF continued. Voiding freely. Ambulating independently. Plan for surgery Friday. Safety measures in place and call light within reach.

## 2022-12-02 ENCOUNTER — ANESTHESIA (OUTPATIENT)
Dept: SURGERY | Facility: HOSPITAL | Age: 52
End: 2022-12-02
Payer: MEDICAID

## 2022-12-02 ENCOUNTER — ANESTHESIA EVENT (OUTPATIENT)
Dept: SURGERY | Facility: HOSPITAL | Age: 52
End: 2022-12-02
Payer: MEDICAID

## 2022-12-02 PROBLEM — K82.3: Status: ACTIVE | Noted: 2022-12-02

## 2022-12-02 LAB
ALBUMIN SERPL-MCNC: 1.9 G/DL (ref 3.4–5)
ALP LIVER SERPL-CCNC: 470 U/L
ALT SERPL-CCNC: 88 U/L
ANTIBODY SCREEN: NEGATIVE
AST SERPL-CCNC: 99 U/L (ref 15–37)
B-HCG UR QL: NEGATIVE
BILIRUB DIRECT SERPL-MCNC: 7.6 MG/DL (ref 0–0.2)
BILIRUB SERPL-MCNC: 9.6 MG/DL (ref 0.1–2)
POCT HEMOCUE: 8.5 (ref 12–16)
PROT SERPL-MCNC: 7.2 G/DL (ref 6.4–8.2)
RH BLOOD TYPE: POSITIVE
RH BLOOD TYPE: POSITIVE
SARS-COV-2 RNA RESP QL NAA+PROBE: NOT DETECTED

## 2022-12-02 PROCEDURE — 0FT40ZZ RESECTION OF GALLBLADDER, OPEN APPROACH: ICD-10-PCS | Performed by: SURGERY

## 2022-12-02 PROCEDURE — 0DQL0ZZ REPAIR TRANSVERSE COLON, OPEN APPROACH: ICD-10-PCS | Performed by: SURGERY

## 2022-12-02 RX ORDER — SODIUM CHLORIDE, SODIUM LACTATE, POTASSIUM CHLORIDE, CALCIUM CHLORIDE 600; 310; 30; 20 MG/100ML; MG/100ML; MG/100ML; MG/100ML
INJECTION, SOLUTION INTRAVENOUS CONTINUOUS
Status: DISCONTINUED | OUTPATIENT
Start: 2022-12-02 | End: 2022-12-02 | Stop reason: HOSPADM

## 2022-12-02 RX ORDER — PROCHLORPERAZINE EDISYLATE 5 MG/ML
5 INJECTION INTRAMUSCULAR; INTRAVENOUS EVERY 8 HOURS PRN
Status: DISCONTINUED | OUTPATIENT
Start: 2022-12-02 | End: 2022-12-06

## 2022-12-02 RX ORDER — ROCURONIUM BROMIDE 10 MG/ML
INJECTION, SOLUTION INTRAVENOUS AS NEEDED
Status: DISCONTINUED | OUTPATIENT
Start: 2022-12-02 | End: 2022-12-02 | Stop reason: SURG

## 2022-12-02 RX ORDER — HYDROMORPHONE HYDROCHLORIDE 1 MG/ML
0.6 INJECTION, SOLUTION INTRAMUSCULAR; INTRAVENOUS; SUBCUTANEOUS EVERY 5 MIN PRN
Status: DISCONTINUED | OUTPATIENT
Start: 2022-12-02 | End: 2022-12-02 | Stop reason: HOSPADM

## 2022-12-02 RX ORDER — PHENYLEPHRINE HCL 10 MG/ML
VIAL (ML) INJECTION AS NEEDED
Status: DISCONTINUED | OUTPATIENT
Start: 2022-12-02 | End: 2022-12-02 | Stop reason: SURG

## 2022-12-02 RX ORDER — HYDROMORPHONE HYDROCHLORIDE 1 MG/ML
0.4 INJECTION, SOLUTION INTRAMUSCULAR; INTRAVENOUS; SUBCUTANEOUS EVERY 5 MIN PRN
Status: DISCONTINUED | OUTPATIENT
Start: 2022-12-02 | End: 2022-12-02 | Stop reason: HOSPADM

## 2022-12-02 RX ORDER — MORPHINE SULFATE 4 MG/ML
2 INJECTION, SOLUTION INTRAMUSCULAR; INTRAVENOUS EVERY 10 MIN PRN
Status: DISCONTINUED | OUTPATIENT
Start: 2022-12-02 | End: 2022-12-02 | Stop reason: HOSPADM

## 2022-12-02 RX ORDER — SODIUM CHLORIDE, SODIUM LACTATE, POTASSIUM CHLORIDE, CALCIUM CHLORIDE 600; 310; 30; 20 MG/100ML; MG/100ML; MG/100ML; MG/100ML
INJECTION, SOLUTION INTRAVENOUS CONTINUOUS PRN
Status: DISCONTINUED | OUTPATIENT
Start: 2022-12-02 | End: 2022-12-02 | Stop reason: SURG

## 2022-12-02 RX ORDER — OXYCODONE HYDROCHLORIDE 5 MG/1
10 TABLET ORAL EVERY 4 HOURS PRN
Status: DISCONTINUED | OUTPATIENT
Start: 2022-12-02 | End: 2022-12-06

## 2022-12-02 RX ORDER — KETOROLAC TROMETHAMINE 30 MG/ML
30 INJECTION, SOLUTION INTRAMUSCULAR; INTRAVENOUS EVERY 6 HOURS
Status: DISPENSED | OUTPATIENT
Start: 2022-12-02 | End: 2022-12-04

## 2022-12-02 RX ORDER — DEXAMETHASONE SODIUM PHOSPHATE 4 MG/ML
VIAL (ML) INJECTION AS NEEDED
Status: DISCONTINUED | OUTPATIENT
Start: 2022-12-02 | End: 2022-12-02 | Stop reason: SURG

## 2022-12-02 RX ORDER — BISACODYL 10 MG
10 SUPPOSITORY, RECTAL RECTAL
Status: DISCONTINUED | OUTPATIENT
Start: 2022-12-02 | End: 2022-12-06

## 2022-12-02 RX ORDER — ONDANSETRON 2 MG/ML
INJECTION INTRAMUSCULAR; INTRAVENOUS AS NEEDED
Status: DISCONTINUED | OUTPATIENT
Start: 2022-12-02 | End: 2022-12-02 | Stop reason: SURG

## 2022-12-02 RX ORDER — SCOLOPAMINE TRANSDERMAL SYSTEM 1 MG/1
1 PATCH, EXTENDED RELEASE TRANSDERMAL
Status: DISCONTINUED | OUTPATIENT
Start: 2022-12-02 | End: 2022-12-02 | Stop reason: HOSPADM

## 2022-12-02 RX ORDER — ONDANSETRON 2 MG/ML
4 INJECTION INTRAMUSCULAR; INTRAVENOUS EVERY 6 HOURS PRN
Status: DISCONTINUED | OUTPATIENT
Start: 2022-12-02 | End: 2022-12-02 | Stop reason: HOSPADM

## 2022-12-02 RX ORDER — SODIUM PHOSPHATE, DIBASIC AND SODIUM PHOSPHATE, MONOBASIC 7; 19 G/133ML; G/133ML
1 ENEMA RECTAL ONCE AS NEEDED
Status: DISCONTINUED | OUTPATIENT
Start: 2022-12-02 | End: 2022-12-06

## 2022-12-02 RX ORDER — NALOXONE HYDROCHLORIDE 0.4 MG/ML
80 INJECTION, SOLUTION INTRAMUSCULAR; INTRAVENOUS; SUBCUTANEOUS AS NEEDED
Status: DISCONTINUED | OUTPATIENT
Start: 2022-12-02 | End: 2022-12-02 | Stop reason: HOSPADM

## 2022-12-02 RX ORDER — LIDOCAINE HYDROCHLORIDE 10 MG/ML
INJECTION, SOLUTION EPIDURAL; INFILTRATION; INTRACAUDAL; PERINEURAL AS NEEDED
Status: DISCONTINUED | OUTPATIENT
Start: 2022-12-02 | End: 2022-12-02 | Stop reason: SURG

## 2022-12-02 RX ORDER — HYDROMORPHONE HYDROCHLORIDE 1 MG/ML
0.4 INJECTION, SOLUTION INTRAMUSCULAR; INTRAVENOUS; SUBCUTANEOUS EVERY 2 HOUR PRN
Status: DISCONTINUED | OUTPATIENT
Start: 2022-12-02 | End: 2022-12-06

## 2022-12-02 RX ORDER — ONDANSETRON 2 MG/ML
4 INJECTION INTRAMUSCULAR; INTRAVENOUS EVERY 6 HOURS PRN
Status: DISCONTINUED | OUTPATIENT
Start: 2022-12-02 | End: 2022-12-06

## 2022-12-02 RX ORDER — HEPARIN SODIUM 5000 [USP'U]/ML
5000 INJECTION, SOLUTION INTRAVENOUS; SUBCUTANEOUS EVERY 12 HOURS SCHEDULED
Status: DISCONTINUED | OUTPATIENT
Start: 2022-12-02 | End: 2022-12-06

## 2022-12-02 RX ORDER — HYDROMORPHONE HYDROCHLORIDE 1 MG/ML
0.2 INJECTION, SOLUTION INTRAMUSCULAR; INTRAVENOUS; SUBCUTANEOUS EVERY 5 MIN PRN
Status: DISCONTINUED | OUTPATIENT
Start: 2022-12-02 | End: 2022-12-02 | Stop reason: HOSPADM

## 2022-12-02 RX ORDER — MORPHINE SULFATE 10 MG/ML
6 INJECTION, SOLUTION INTRAMUSCULAR; INTRAVENOUS EVERY 10 MIN PRN
Status: DISCONTINUED | OUTPATIENT
Start: 2022-12-02 | End: 2022-12-02 | Stop reason: HOSPADM

## 2022-12-02 RX ORDER — POLYETHYLENE GLYCOL 3350 17 G/17G
17 POWDER, FOR SOLUTION ORAL DAILY PRN
Status: DISCONTINUED | OUTPATIENT
Start: 2022-12-02 | End: 2022-12-06

## 2022-12-02 RX ORDER — HYDROMORPHONE HYDROCHLORIDE 1 MG/ML
0.8 INJECTION, SOLUTION INTRAMUSCULAR; INTRAVENOUS; SUBCUTANEOUS EVERY 2 HOUR PRN
Status: DISCONTINUED | OUTPATIENT
Start: 2022-12-02 | End: 2022-12-06

## 2022-12-02 RX ORDER — SENNOSIDES 8.6 MG
17.2 TABLET ORAL NIGHTLY PRN
Status: DISCONTINUED | OUTPATIENT
Start: 2022-12-02 | End: 2022-12-06

## 2022-12-02 RX ORDER — MORPHINE SULFATE 4 MG/ML
4 INJECTION, SOLUTION INTRAMUSCULAR; INTRAVENOUS EVERY 10 MIN PRN
Status: DISCONTINUED | OUTPATIENT
Start: 2022-12-02 | End: 2022-12-02 | Stop reason: HOSPADM

## 2022-12-02 RX ORDER — MIDAZOLAM HYDROCHLORIDE 1 MG/ML
INJECTION INTRAMUSCULAR; INTRAVENOUS AS NEEDED
Status: DISCONTINUED | OUTPATIENT
Start: 2022-12-02 | End: 2022-12-02 | Stop reason: SURG

## 2022-12-02 RX ORDER — GLYCOPYRROLATE 0.2 MG/ML
INJECTION, SOLUTION INTRAMUSCULAR; INTRAVENOUS AS NEEDED
Status: DISCONTINUED | OUTPATIENT
Start: 2022-12-02 | End: 2022-12-02 | Stop reason: SURG

## 2022-12-02 RX ORDER — SODIUM CHLORIDE, SODIUM LACTATE, POTASSIUM CHLORIDE, CALCIUM CHLORIDE 600; 310; 30; 20 MG/100ML; MG/100ML; MG/100ML; MG/100ML
INJECTION, SOLUTION INTRAVENOUS CONTINUOUS
Status: DISCONTINUED | OUTPATIENT
Start: 2022-12-02 | End: 2022-12-06

## 2022-12-02 RX ORDER — HYDROMORPHONE HYDROCHLORIDE 1 MG/ML
INJECTION, SOLUTION INTRAMUSCULAR; INTRAVENOUS; SUBCUTANEOUS AS NEEDED
Status: DISCONTINUED | OUTPATIENT
Start: 2022-12-02 | End: 2022-12-02 | Stop reason: SURG

## 2022-12-02 RX ORDER — OXYCODONE HYDROCHLORIDE 5 MG/1
5 TABLET ORAL EVERY 4 HOURS PRN
Status: DISCONTINUED | OUTPATIENT
Start: 2022-12-02 | End: 2022-12-06

## 2022-12-02 RX ORDER — NEOSTIGMINE METHYLSULFATE 1 MG/ML
INJECTION, SOLUTION INTRAVENOUS AS NEEDED
Status: DISCONTINUED | OUTPATIENT
Start: 2022-12-02 | End: 2022-12-02 | Stop reason: SURG

## 2022-12-02 RX ADMIN — SODIUM CHLORIDE, SODIUM LACTATE, POTASSIUM CHLORIDE, CALCIUM CHLORIDE: 600; 310; 30; 20 INJECTION, SOLUTION INTRAVENOUS at 13:21:00

## 2022-12-02 RX ADMIN — SODIUM CHLORIDE, SODIUM LACTATE, POTASSIUM CHLORIDE, CALCIUM CHLORIDE: 600; 310; 30; 20 INJECTION, SOLUTION INTRAVENOUS at 12:37:00

## 2022-12-02 RX ADMIN — ROCURONIUM BROMIDE 50 MG: 10 INJECTION, SOLUTION INTRAVENOUS at 11:57:00

## 2022-12-02 RX ADMIN — PHENYLEPHRINE HCL 100 MCG: 10 MG/ML VIAL (ML) INJECTION at 12:27:00

## 2022-12-02 RX ADMIN — SODIUM CHLORIDE, SODIUM LACTATE, POTASSIUM CHLORIDE, CALCIUM CHLORIDE: 600; 310; 30; 20 INJECTION, SOLUTION INTRAVENOUS at 13:09:00

## 2022-12-02 RX ADMIN — GLYCOPYRROLATE 0.8 MG: 0.2 INJECTION, SOLUTION INTRAMUSCULAR; INTRAVENOUS at 13:27:00

## 2022-12-02 RX ADMIN — PHENYLEPHRINE HCL 100 MCG: 10 MG/ML VIAL (ML) INJECTION at 13:04:00

## 2022-12-02 RX ADMIN — HYDROMORPHONE HYDROCHLORIDE 0.5 MG: 1 INJECTION, SOLUTION INTRAMUSCULAR; INTRAVENOUS; SUBCUTANEOUS at 12:10:00

## 2022-12-02 RX ADMIN — PHENYLEPHRINE HCL 100 MCG: 10 MG/ML VIAL (ML) INJECTION at 12:37:00

## 2022-12-02 RX ADMIN — SODIUM CHLORIDE, SODIUM LACTATE, POTASSIUM CHLORIDE, CALCIUM CHLORIDE: 600; 310; 30; 20 INJECTION, SOLUTION INTRAVENOUS at 13:10:00

## 2022-12-02 RX ADMIN — NEOSTIGMINE METHYLSULFATE 4 MG: 1 INJECTION, SOLUTION INTRAVENOUS at 13:27:00

## 2022-12-02 RX ADMIN — SODIUM CHLORIDE, SODIUM LACTATE, POTASSIUM CHLORIDE, CALCIUM CHLORIDE: 600; 310; 30; 20 INJECTION, SOLUTION INTRAVENOUS at 11:53:00

## 2022-12-02 RX ADMIN — ONDANSETRON 4 MG: 2 INJECTION INTRAMUSCULAR; INTRAVENOUS at 12:26:00

## 2022-12-02 RX ADMIN — SODIUM CHLORIDE, SODIUM LACTATE, POTASSIUM CHLORIDE, CALCIUM CHLORIDE: 600; 310; 30; 20 INJECTION, SOLUTION INTRAVENOUS at 12:51:00

## 2022-12-02 RX ADMIN — MIDAZOLAM HYDROCHLORIDE 2 MG: 1 INJECTION INTRAMUSCULAR; INTRAVENOUS at 11:53:00

## 2022-12-02 RX ADMIN — DEXAMETHASONE SODIUM PHOSPHATE 4 MG: 4 MG/ML VIAL (ML) INJECTION at 12:26:00

## 2022-12-02 RX ADMIN — LIDOCAINE HYDROCHLORIDE 50 MG: 10 INJECTION, SOLUTION EPIDURAL; INFILTRATION; INTRACAUDAL; PERINEURAL at 11:56:00

## 2022-12-02 NOTE — ANESTHESIA PROCEDURE NOTES
Peripheral IV  Date/Time: 12/2/2022 12:02 PM  Inserted by: Avila Clarke MD    Placement  Needle size: 18 G  Laterality: left  Location: wrist  Site prep: alcohol  Technique: anatomical landmarks  Attempts: 1

## 2022-12-02 NOTE — PLAN OF CARE
Problem: Patient Centered Care  Goal: Patient preferences are identified and integrated in the patient's plan of care  Description: Interventions:  - What would you like us to know as we care for you?  From home with family  - Provide timely, complete, and accurate information to patient/family  - Incorporate patient and family knowledge, values, beliefs, and cultural backgrounds into the planning and delivery of care  - Encourage patient/family to participate in care and decision-making at the level they choose  - Honor patient and family perspectives and choices  Outcome: Progressing     Problem: Patient/Family Goals  Goal: Patient/Family Long Term Goal  Description: Patient's Long Term Goal: To manage condition, get better, and go home    Interventions:  -Monitor labs, results, and vitals  -Administer medication as prescribed and PRN  -Pain management  -Infection management and prevention  -Replace electrolyte per protocol  -I&Os  -Safety, diet, act katlyn, hygiene  -IVF  -IV abx  -Imaging/tests/procedures  -Follow plan of care  -Monitor for worsening condition or new onset of symptoms  - See additional Care Plan goals for specific interventions  Outcome: Progressing  Goal: Patient/Family Short Term Goal  Description: Patient's Short Term Goal: Pain and nausea management    Interventions:   -Assess pain and nausea level  -Encourage pt to notify of increasing pain and nausea  -Administer pain medication as prescribed and prn  -Provide non-pharmacological intervention as needed  -Follow plan of care  - See additional Care Plan goals for specific interventions  Outcome: Progressing     Problem: PAIN - ADULT  Goal: Verbalizes/displays adequate comfort level or patient's stated pain goal  Description: INTERVENTIONS:  - Encourage pt to monitor pain and request assistance  - Assess pain using appropriate pain scale  - Administer analgesics based on type and severity of pain and evaluate response  - Implement non-pharmacological measures as appropriate and evaluate response  - Consider cultural and social influences on pain and pain management  - Manage/alleviate anxiety  - Utilize distraction and/or relaxation techniques  - Monitor for opioid side effects  - Notify MD/LIP if interventions unsuccessful or patient reports new pain  - Anticipate increased pain with activity and pre-medicate as appropriate  Outcome: Progressing     Problem: SAFETY ADULT - FALL  Goal: Free from fall injury  Description: INTERVENTIONS:  - Assess pt frequently for physical needs  - Identify cognitive and physical deficits and behaviors that affect risk of falls.   - [place] fall precautions as indicated by assessment.  - Educate pt/family on patient safety including physical limitations  - Instruct pt to call for assistance with activity based on assessment  - Modify environment to reduce risk of injury  - Provide assistive devices as appropriate  - Consider OT/PT consult to assist with strengthening/mobility  - Encourage toileting schedule  Outcome: Progressing     Problem: DISCHARGE PLANNING  Goal: Discharge to home or other facility with appropriate resources  Description: INTERVENTIONS:  - Identify barriers to discharge w/pt and caregiver  - Include patient/family/discharge partner in discharge planning  - Arrange for needed discharge resources and transportation as appropriate  - Identify discharge learning needs (meds, wound care, etc)  - Arrange for interpreters to assist at discharge as needed  - Consider post-discharge preferences of patient/family/discharge partner  - Complete POLST form as appropriate  - Assess patient's ability to be responsible for managing their own health  - Refer to Case Management Department for coordinating discharge planning if the patient needs post-hospital services based on physician/LIP order or complex needs related to functional status, cognitive ability or social support system  Outcome: Progressing     Problem: GASTROINTESTINAL - ADULT  Goal: Minimal or absence of nausea and vomiting  Description: INTERVENTIONS:  - Maintain adequate hydration with IV or PO as ordered and tolerated  - Nasogastric tube to low intermittent suction as ordered  - Evaluate effectiveness of ordered antiemetic medications  - Provide nonpharmacologic comfort measures as appropriate  - Advance diet as tolerated, if ordered  - Obtain nutritional consult as needed  - Evaluate fluid balance  Outcome: Progressing  Goal: Maintains or returns to baseline bowel function  Description: INTERVENTIONS:  - Assess bowel function  - Maintain adequate hydration with IV or PO as ordered and tolerated  - Evaluate effectiveness of GI medications  - Encourage mobilization and activity  - Obtain nutritional consult as needed  - Establish a toileting routine/schedule  - Consider collaborating with pharmacy to review patient's medication profile  Outcome: Progressing    Patient went to surgery during day. Returned from PACU. In room, post op pain meds given. Patient A&Ox3, denies chest pain, call light in reach and safety needs met. Family at bedside.

## 2022-12-02 NOTE — PLAN OF CARE
Patient's pain managed with Norco. Afebrile overnight. She denies of nausea or vomiting. Npo since midnight. Plan is for a cholecystectomy @ 10:45 this morning. Consent signed and in the chart. Safety precautions in place. Problem: Patient Centered Care  Goal: Patient preferences are identified and integrated in the patient's plan of care  Description: Interventions:  - What would you like us to know as we care for you?  From home with family  - Provide timely, complete, and accurate information to patient/family  - Incorporate patient and family knowledge, values, beliefs, and cultural backgrounds into the planning and delivery of care  - Encourage patient/family to participate in care and decision-making at the level they choose  - Honor patient and family perspectives and choices  Outcome: Progressing     Problem: Patient/Family Goals  Goal: Patient/Family Long Term Goal  Description: Patient's Long Term Goal: To manage condition, get better, and go home    Interventions:  -Monitor labs, results, and vitals  -Administer medication as prescribed and PRN  -Pain management  -Infection management and prevention  -Replace electrolyte per protocol  -I&Os  -Safety, diet, act katlyn, hygiene  -IVF  -IV abx  -Imaging/tests/procedures  -Follow plan of care  -Monitor for worsening condition or new onset of symptoms  - See additional Care Plan goals for specific interventions  Outcome: Progressing  Goal: Patient/Family Short Term Goal  Description: Patient's Short Term Goal: Pain and nausea management    Interventions:   -Assess pain and nausea level  -Encourage pt to notify of increasing pain and nausea  -Administer pain medication as prescribed and prn  -Provide non-pharmacological intervention as needed  -Follow plan of care  - See additional Care Plan goals for specific interventions  Outcome: Progressing     Problem: PAIN - ADULT  Goal: Verbalizes/displays adequate comfort level or patient's stated pain goal  Description: INTERVENTIONS:  - Encourage pt to monitor pain and request assistance  - Assess pain using appropriate pain scale  - Administer analgesics based on type and severity of pain and evaluate response  - Implement non-pharmacological measures as appropriate and evaluate response  - Consider cultural and social influences on pain and pain management  - Manage/alleviate anxiety  - Utilize distraction and/or relaxation techniques  - Monitor for opioid side effects  - Notify MD/LIP if interventions unsuccessful or patient reports new pain  - Anticipate increased pain with activity and pre-medicate as appropriate  Outcome: Progressing     Problem: GASTROINTESTINAL - ADULT  Goal: Minimal or absence of nausea and vomiting  Description: INTERVENTIONS:  - Maintain adequate hydration with IV or PO as ordered and tolerated  - Nasogastric tube to low intermittent suction as ordered  - Evaluate effectiveness of ordered antiemetic medications  - Provide nonpharmacologic comfort measures as appropriate  - Advance diet as tolerated, if ordered  - Obtain nutritional consult as needed  - Evaluate fluid balance  Outcome: Progressing  Goal: Maintains or returns to baseline bowel function  Description: INTERVENTIONS:  - Assess bowel function  - Maintain adequate hydration with IV or PO as ordered and tolerated  - Evaluate effectiveness of GI medications  - Encourage mobilization and activity  - Obtain nutritional consult as needed  - Establish a toileting routine/schedule  - Consider collaborating with pharmacy to review patient's medication profile  Outcome: Progressing     Problem: SAFETY ADULT - FALL  Goal: Free from fall injury  Description: INTERVENTIONS:  - Assess pt frequently for physical needs  - Identify cognitive and physical deficits and behaviors that affect risk of falls.   - [place] fall precautions as indicated by assessment.  - Educate pt/family on patient safety including physical limitations  - Instruct pt to call for assistance with activity based on assessment  - Modify environment to reduce risk of injury  - Provide assistive devices as appropriate  - Consider OT/PT consult to assist with strengthening/mobility  - Encourage toileting schedule  Outcome: Progressing     Problem: DISCHARGE PLANNING  Goal: Discharge to home or other facility with appropriate resources  Description: INTERVENTIONS:  - Identify barriers to discharge w/pt and caregiver  - Include patient/family/discharge partner in discharge planning  - Arrange for needed discharge resources and transportation as appropriate  - Identify discharge learning needs (meds, wound care, etc)  - Arrange for interpreters to assist at discharge as needed  - Consider post-discharge preferences of patient/family/discharge partner  - Complete POLST form as appropriate  - Assess patient's ability to be responsible for managing their own health  - Refer to Case Management Department for coordinating discharge planning if the patient needs post-hospital services based on physician/LIP order or complex needs related to functional status, cognitive ability or social support system  Outcome: Progressing     Problem: METABOLIC/FLUID AND ELECTROLYTES - ADULT  Goal: Electrolytes maintained within normal limits  Description: INTERVENTIONS:  - Monitor labs and rhythm and assess patient for signs and symptoms of electrolyte imbalances  - Administer electrolyte replacement as ordered  - Monitor response to electrolyte replacements, including rhythm and repeat lab results as appropriate  - Fluid restriction as ordered  - Instruct patient on fluid and nutrition restrictions as appropriate  Outcome: Progressing

## 2022-12-02 NOTE — OPERATIVE REPORT
Operative Report    Patient Name:  Rhona Garcia  MR:  [id number]  :  1970  DOS:  20    Preop Dx:  Mirizzi syndrome  Postop Dx:    1. Mirizzi syndrome  2. Cholecytocolonic fistula  Procedure:    1. Open cholecystectomy  2. Closure of cholecystocolonic fistula  Surgeon:  Lilli Doe MD  Surgical Assistant.: Jamari Littlejohn CSA, Shahla Magdaleno MD  EBL: Blood Output: 400 mL (2022 94:30 PM)    Complication:  None    INDICATION:  Pt is a 45year old female who with Mirizzi syndrome who is scheduled for a Open cholecystectomy, closure of cholecystolecholonic fistula. CONSENT:  An informed consent discussion was held with the patient regarding the nature of Mirizzi syndrome, the treatment options and the details of the procedure. The risks including but not limited to bleeding, wound infection, intra-abdominal infection, injury to the liver, colon, small intestine, pancreas, stomach, common bile duct, incomplete resection, cystic duct stump leak, retained stone and incisional hernia were discussed. The patient expressed understanding and want to proceed with the planned procedure. TECHNIQUE:  The patient was taken to the OR and placed in supine position. General anesthesia was established and the abdomen was prepped in standard fashion. A right subcostal incision was made and the abdomen was entered without complication. The liver appeared cirrhotic, the gallbladder was massively dilated and densely adherent to the omentum and transverse colon. The Benites retractor system was placed to expose the RUQ. We  the omental adhesion to the fundus of the gallbladder. The gallbladder was decompressed and clear fluid was seen consistent with hydrops. The midportion of the gallbladder was necrotic and there appeared to be a fistulous connection to the proximal transverse colon. This fistula was easily  using blunt dissection.   I sutured the hole in the proximal transverse colon using 2 layers of 4-0 PDS. The closure appeared adequate. Attention was turned to removing the gallbladder. A top down approach was taken to remove the gallbladder. The cystic duct was massively dilated and a moderate sized stone was extracted from the cystic duct. This stone is likely the cause of the compression on the bile duct leading to Mirrizzi's syndrome. The cystic duct stump was closed in two layers of 4-0 PDS. The operative field was irrigated with saline and hemostasis was achieved. A 10 RAJESH drain was placed in the gallbladder fossa. The abdominal wall was closed in two layers of running #1 looped PDS. The skin incision was closed using staples. Sterile dressings were applied. All instrument and sponge counts were correct. I was present during the critical portions of the procedure.     Haja Gray MD

## 2022-12-02 NOTE — ANESTHESIA PROCEDURE NOTES
Airway  Date/Time: 12/2/2022 11:59 AM  Urgency: Elective    Airway not difficult    General Information and Staff    Patient location during procedure: OR  Anesthesiologist: Alida Condon MD  Performed: anesthesiologist     Indications and Patient Condition  Indications for airway management: anesthesia  Spontaneous Ventilation: absent  Sedation level: deep  Preoxygenated: yes  Patient position: sniffing  Mask difficulty assessment: 1 - vent by mask  Planned trial extubation    Final Airway Details  Final airway type: endotracheal airway      Successful airway: ETT  Cuffed: yes   Successful intubation technique: direct laryngoscopy  Endotracheal tube insertion site: oral  Blade: Davey  Blade size: #3  ETT size (mm): 7.0    Cormack-Lehane Classification: grade I - full view of glottis  Placement verified by: chest auscultation and capnometry   Measured from: lips  ETT to lips (cm): 22  Number of attempts at approach: 1    Additional Comments  Intubated easily first attempt.  No dental or soft tissue damage

## 2022-12-03 LAB
ALBUMIN SERPL-MCNC: 1.8 G/DL (ref 3.4–5)
ALBUMIN/GLOB SERPL: 0.4 {RATIO} (ref 1–2)
ALP LIVER SERPL-CCNC: 427 U/L
ALT SERPL-CCNC: 87 U/L
ANION GAP SERPL CALC-SCNC: 6 MMOL/L (ref 0–18)
AST SERPL-CCNC: 104 U/L (ref 15–37)
BASOPHILS # BLD AUTO: 0.02 X10(3) UL (ref 0–0.2)
BASOPHILS NFR BLD AUTO: 0.1 %
BILIRUB SERPL-MCNC: 7.5 MG/DL (ref 0.1–2)
BUN BLD-MCNC: 15 MG/DL (ref 7–18)
BUN/CREAT SERPL: 17.6 (ref 10–20)
CALCIUM BLD-MCNC: 8.3 MG/DL (ref 8.5–10.1)
CHLORIDE SERPL-SCNC: 101 MMOL/L (ref 98–112)
CO2 SERPL-SCNC: 29 MMOL/L (ref 21–32)
CREAT BLD-MCNC: 0.85 MG/DL
DEPRECATED RDW RBC AUTO: 48 FL (ref 35.1–46.3)
EOSINOPHIL # BLD AUTO: 0 X10(3) UL (ref 0–0.7)
EOSINOPHIL NFR BLD AUTO: 0 %
ERYTHROCYTE [DISTWIDTH] IN BLOOD BY AUTOMATED COUNT: 13.8 % (ref 11–15)
GFR SERPLBLD BASED ON 1.73 SQ M-ARVRAT: 81 ML/MIN/1.73M2 (ref 60–?)
GLOBULIN PLAS-MCNC: 5.1 G/DL (ref 2.8–4.4)
GLUCOSE BLD-MCNC: 121 MG/DL (ref 70–99)
HCT VFR BLD AUTO: 28.2 %
HGB BLD-MCNC: 9.3 G/DL
IMM GRANULOCYTES # BLD AUTO: 0.11 X10(3) UL (ref 0–1)
IMM GRANULOCYTES NFR BLD: 0.8 %
LYMPHOCYTES # BLD AUTO: 1.44 X10(3) UL (ref 1–4)
LYMPHOCYTES NFR BLD AUTO: 10.2 %
MAGNESIUM SERPL-MCNC: 1.9 MG/DL (ref 1.6–2.6)
MCH RBC QN AUTO: 31.3 PG (ref 26–34)
MCHC RBC AUTO-ENTMCNC: 33 G/DL (ref 31–37)
MCV RBC AUTO: 94.9 FL
MONOCYTES # BLD AUTO: 0.87 X10(3) UL (ref 0.1–1)
MONOCYTES NFR BLD AUTO: 6.2 %
NEUTROPHILS # BLD AUTO: 11.67 X10 (3) UL (ref 1.5–7.7)
NEUTROPHILS # BLD AUTO: 11.67 X10(3) UL (ref 1.5–7.7)
NEUTROPHILS NFR BLD AUTO: 82.7 %
OSMOLALITY SERPL CALC.SUM OF ELEC: 284 MOSM/KG (ref 275–295)
PHOSPHATE SERPL-MCNC: 3.6 MG/DL (ref 2.5–4.9)
PLATELET # BLD AUTO: 362 10(3)UL (ref 150–450)
POTASSIUM SERPL-SCNC: 4.3 MMOL/L (ref 3.5–5.1)
PROT SERPL-MCNC: 6.9 G/DL (ref 6.4–8.2)
RBC # BLD AUTO: 2.97 X10(6)UL
SODIUM SERPL-SCNC: 136 MMOL/L (ref 136–145)
WBC # BLD AUTO: 14.1 X10(3) UL (ref 4–11)

## 2022-12-03 NOTE — PLAN OF CARE
Patient is alert and oriented. POD 1 open cholecystectomy. Incision is edematous with scant serosanginous discharge. Dressing changed once this AM. Right RAJESH with  serosanguinous output. Pain managed with scheduled medications. Abdominal binder in place. Tolerating low fiber soft diet. Problem: Patient Centered Care  Goal: Patient preferences are identified and integrated in the patient's plan of care  Description: Interventions:  - What would you like us to know as we care for you?  From home with family  - Provide timely, complete, and accurate information to patient/family  - Incorporate patient and family knowledge, values, beliefs, and cultural backgrounds into the planning and delivery of care  - Encourage patient/family to participate in care and decision-making at the level they choose  - Honor patient and family perspectives and choices  Outcome: Progressing     Problem: Patient/Family Goals  Goal: Patient/Family Long Term Goal  Description: Patient's Long Term Goal: To manage condition, get better, and go home    Interventions:  -Monitor labs, results, and vitals  -Administer medication as prescribed and PRN  -Pain management  -Infection management and prevention  -Replace electrolyte per protocol  -I&Os  -Safety, diet, act katlyn, hygiene  -IVF  -IV abx  -Imaging/tests/procedures  -Follow plan of care  -Monitor for worsening condition or new onset of symptoms  - See additional Care Plan goals for specific interventions  Outcome: Progressing  Goal: Patient/Family Short Term Goal  Description: Patient's Short Term Goal: Pain and nausea management    Interventions:   -Assess pain and nausea level  -Encourage pt to notify of increasing pain and nausea  -Administer pain medication as prescribed and prn  -Provide non-pharmacological intervention as needed  -Follow plan of care  - See additional Care Plan goals for specific interventions  Outcome: Progressing     Problem: PAIN - ADULT  Goal: Verbalizes/displays adequate comfort level or patient's stated pain goal  Description: INTERVENTIONS:  - Encourage pt to monitor pain and request assistance  - Assess pain using appropriate pain scale  - Administer analgesics based on type and severity of pain and evaluate response  - Implement non-pharmacological measures as appropriate and evaluate response  - Consider cultural and social influences on pain and pain management  - Manage/alleviate anxiety  - Utilize distraction and/or relaxation techniques  - Monitor for opioid side effects  - Notify MD/LIP if interventions unsuccessful or patient reports new pain  - Anticipate increased pain with activity and pre-medicate as appropriate  Outcome: Progressing     Problem: SAFETY ADULT - FALL  Goal: Free from fall injury  Description: INTERVENTIONS:  - Assess pt frequently for physical needs  - Identify cognitive and physical deficits and behaviors that affect risk of falls.   - [place] fall precautions as indicated by assessment.  - Educate pt/family on patient safety including physical limitations  - Instruct pt to call for assistance with activity based on assessment  - Modify environment to reduce risk of injury  - Provide assistive devices as appropriate  - Consider OT/PT consult to assist with strengthening/mobility  - Encourage toileting schedule  Outcome: Progressing     Problem: DISCHARGE PLANNING  Goal: Discharge to home or other facility with appropriate resources  Description: INTERVENTIONS:  - Identify barriers to discharge w/pt and caregiver  - Include patient/family/discharge partner in discharge planning  - Arrange for needed discharge resources and transportation as appropriate  - Identify discharge learning needs (meds, wound care, etc)  - Arrange for interpreters to assist at discharge as needed  - Consider post-discharge preferences of patient/family/discharge partner  - Complete POLST form as appropriate  - Assess patient's ability to be responsible for managing their own health  - Refer to Case Management Department for coordinating discharge planning if the patient needs post-hospital services based on physician/LIP order or complex needs related to functional status, cognitive ability or social support system  Outcome: Progressing     Problem: GASTROINTESTINAL - ADULT  Goal: Minimal or absence of nausea and vomiting  Description: INTERVENTIONS:  - Maintain adequate hydration with IV or PO as ordered and tolerated  - Nasogastric tube to low intermittent suction as ordered  - Evaluate effectiveness of ordered antiemetic medications  - Provide nonpharmacologic comfort measures as appropriate  - Advance diet as tolerated, if ordered  - Obtain nutritional consult as needed  - Evaluate fluid balance  Outcome: Progressing  Goal: Maintains or returns to baseline bowel function  Description: INTERVENTIONS:  - Assess bowel function  - Maintain adequate hydration with IV or PO as ordered and tolerated  - Evaluate effectiveness of GI medications  - Encourage mobilization and activity  - Obtain nutritional consult as needed  - Establish a toileting routine/schedule  - Consider collaborating with pharmacy to review patient's medication profile  Outcome: Progressing     Problem: METABOLIC/FLUID AND ELECTROLYTES - ADULT  Goal: Electrolytes maintained within normal limits  Description: INTERVENTIONS:  - Monitor labs and rhythm and assess patient for signs and symptoms of electrolyte imbalances  - Administer electrolyte replacement as ordered  - Monitor response to electrolyte replacements, including rhythm and repeat lab results as appropriate  - Fluid restriction as ordered  - Instruct patient on fluid and nutrition restrictions as appropriate  Outcome: Progressing

## 2022-12-03 NOTE — PLAN OF CARE
Patient's pain managed with Dilaudid PRN and scheduled Toradol. Ambulates independently, tolerating well. SL. IV antibiotics infusing. Advanced to soft/low fiber this morning. 0330-  Patient abdominal dressing was saturated with blood through abdominal binder. Site swollen. MD paged, new dressing and binder was put it per verbal order. 0530-  Dressing was partially saturated. Site swollen. MD paged, new dressing was put in per verbal order. VSS as charted. Problem: Patient Centered Care  Goal: Patient preferences are identified and integrated in the patient's plan of care  Description: Interventions:  - What would you like us to know as we care for you?  From home with family  - Provide timely, complete, and accurate information to patient/family  - Incorporate patient and family knowledge, values, beliefs, and cultural backgrounds into the planning and delivery of care  - Encourage patient/family to participate in care and decision-making at the level they choose  - Honor patient and family perspectives and choices  Outcome: Progressing     Problem: Patient/Family Goals  Goal: Patient/Family Long Term Goal  Description: Patient's Long Term Goal: To manage condition, get better, and go home    Interventions:  -Monitor labs, results, and vitals  -Administer medication as prescribed and PRN  -Pain management  -Infection management and prevention  -Replace electrolyte per protocol  -I&Os  -Safety, diet, act katlyn, hygiene  -IVF  -IV abx  -Imaging/tests/procedures  -Follow plan of care  -Monitor for worsening condition or new onset of symptoms  - See additional Care Plan goals for specific interventions  Outcome: Progressing  Goal: Patient/Family Short Term Goal  Description: Patient's Short Term Goal: Pain and nausea management    Interventions:   -Assess pain and nausea level  -Encourage pt to notify of increasing pain and nausea  -Administer pain medication as prescribed and prn  -Provide non-pharmacological intervention as needed  -Follow plan of care  - See additional Care Plan goals for specific interventions  Outcome: Progressing     Problem: PAIN - ADULT  Goal: Verbalizes/displays adequate comfort level or patient's stated pain goal  Description: INTERVENTIONS:  - Encourage pt to monitor pain and request assistance  - Assess pain using appropriate pain scale  - Administer analgesics based on type and severity of pain and evaluate response  - Implement non-pharmacological measures as appropriate and evaluate response  - Consider cultural and social influences on pain and pain management  - Manage/alleviate anxiety  - Utilize distraction and/or relaxation techniques  - Monitor for opioid side effects  - Notify MD/LIP if interventions unsuccessful or patient reports new pain  - Anticipate increased pain with activity and pre-medicate as appropriate  Outcome: Progressing     Problem: GASTROINTESTINAL - ADULT  Goal: Minimal or absence of nausea and vomiting  Description: INTERVENTIONS:  - Maintain adequate hydration with IV or PO as ordered and tolerated  - Nasogastric tube to low intermittent suction as ordered  - Evaluate effectiveness of ordered antiemetic medications  - Provide nonpharmacologic comfort measures as appropriate  - Advance diet as tolerated, if ordered  - Obtain nutritional consult as needed  - Evaluate fluid balance  Outcome: Progressing  Goal: Maintains or returns to baseline bowel function  Description: INTERVENTIONS:  - Assess bowel function  - Maintain adequate hydration with IV or PO as ordered and tolerated  - Evaluate effectiveness of GI medications  - Encourage mobilization and activity  - Obtain nutritional consult as needed  - Establish a toileting routine/schedule  - Consider collaborating with pharmacy to review patient's medication profile  Outcome: Progressing     Problem: SAFETY ADULT - FALL  Goal: Free from fall injury  Description: INTERVENTIONS:  - Assess pt frequently for physical needs  - Identify cognitive and physical deficits and behaviors that affect risk of falls.   - [place] fall precautions as indicated by assessment.  - Educate pt/family on patient safety including physical limitations  - Instruct pt to call for assistance with activity based on assessment  - Modify environment to reduce risk of injury  - Provide assistive devices as appropriate  - Consider OT/PT consult to assist with strengthening/mobility  - Encourage toileting schedule  Outcome: Progressing     Problem: DISCHARGE PLANNING  Goal: Discharge to home or other facility with appropriate resources  Description: INTERVENTIONS:  - Identify barriers to discharge w/pt and caregiver  - Include patient/family/discharge partner in discharge planning  - Arrange for needed discharge resources and transportation as appropriate  - Identify discharge learning needs (meds, wound care, etc)  - Arrange for interpreters to assist at discharge as needed  - Consider post-discharge preferences of patient/family/discharge partner  - Complete POLST form as appropriate  - Assess patient's ability to be responsible for managing their own health  - Refer to Case Management Department for coordinating discharge planning if the patient needs post-hospital services based on physician/LIP order or complex needs related to functional status, cognitive ability or social support system  Outcome: Progressing     Problem: METABOLIC/FLUID AND ELECTROLYTES - ADULT  Goal: Electrolytes maintained within normal limits  Description: INTERVENTIONS:  - Monitor labs and rhythm and assess patient for signs and symptoms of electrolyte imbalances  - Administer electrolyte replacement as ordered  - Monitor response to electrolyte replacements, including rhythm and repeat lab results as appropriate  - Fluid restriction as ordered  - Instruct patient on fluid and nutrition restrictions as appropriate  Outcome: Progressing

## 2022-12-04 NOTE — PLAN OF CARE
Patient is alert and oriented. Endorses mild pain and declined medications for pain. Swelling near incision  improved with serous drainage. Dressing changed. RAJESH drain to bulb suction with serosanginous drainage. IV antibiotics continued. Tolerating low fiber soft diet. Safety precautions in place. Problem: Patient Centered Care  Goal: Patient preferences are identified and integrated in the patient's plan of care  Description: Interventions:  - What would you like us to know as we care for you?  From home with family  - Provide timely, complete, and accurate information to patient/family  - Incorporate patient and family knowledge, values, beliefs, and cultural backgrounds into the planning and delivery of care  - Encourage patient/family to participate in care and decision-making at the level they choose  - Honor patient and family perspectives and choices  Outcome: Progressing     Problem: Patient/Family Goals  Goal: Patient/Family Long Term Goal  Description: Patient's Long Term Goal: To manage condition, get better, and go home    Interventions:  -Monitor labs, results, and vitals  -Administer medication as prescribed and PRN  -Pain management  -Infection management and prevention  -Replace electrolyte per protocol  -I&Os  -Safety, diet, act katlyn, hygiene  -IVF  -IV abx  -Imaging/tests/procedures  -Follow plan of care  -Monitor for worsening condition or new onset of symptoms  - See additional Care Plan goals for specific interventions  Outcome: Progressing  Goal: Patient/Family Short Term Goal  Description: Patient's Short Term Goal: Pain and nausea management    Interventions:   -Assess pain and nausea level  -Encourage pt to notify of increasing pain and nausea  -Administer pain medication as prescribed and prn  -Provide non-pharmacological intervention as needed  -Follow plan of care  - See additional Care Plan goals for specific interventions  Outcome: Progressing     Problem: PAIN - ADULT  Goal: Verbalizes/displays adequate comfort level or patient's stated pain goal  Description: INTERVENTIONS:  - Encourage pt to monitor pain and request assistance  - Assess pain using appropriate pain scale  - Administer analgesics based on type and severity of pain and evaluate response  - Implement non-pharmacological measures as appropriate and evaluate response  - Consider cultural and social influences on pain and pain management  - Manage/alleviate anxiety  - Utilize distraction and/or relaxation techniques  - Monitor for opioid side effects  - Notify MD/LIP if interventions unsuccessful or patient reports new pain  - Anticipate increased pain with activity and pre-medicate as appropriate  Outcome: Progressing     Problem: SAFETY ADULT - FALL  Goal: Free from fall injury  Description: INTERVENTIONS:  - Assess pt frequently for physical needs  - Identify cognitive and physical deficits and behaviors that affect risk of falls.   - [place] fall precautions as indicated by assessment.  - Educate pt/family on patient safety including physical limitations  - Instruct pt to call for assistance with activity based on assessment  - Modify environment to reduce risk of injury  - Provide assistive devices as appropriate  - Consider OT/PT consult to assist with strengthening/mobility  - Encourage toileting schedule  Outcome: Progressing     Problem: DISCHARGE PLANNING  Goal: Discharge to home or other facility with appropriate resources  Description: INTERVENTIONS:  - Identify barriers to discharge w/pt and caregiver  - Include patient/family/discharge partner in discharge planning  - Arrange for needed discharge resources and transportation as appropriate  - Identify discharge learning needs (meds, wound care, etc)  - Arrange for interpreters to assist at discharge as needed  - Consider post-discharge preferences of patient/family/discharge partner  - Complete POLST form as appropriate  - Assess patient's ability to be responsible for managing their own health  - Refer to Case Management Department for coordinating discharge planning if the patient needs post-hospital services based on physician/LIP order or complex needs related to functional status, cognitive ability or social support system  Outcome: Progressing     Problem: GASTROINTESTINAL - ADULT  Goal: Minimal or absence of nausea and vomiting  Description: INTERVENTIONS:  - Maintain adequate hydration with IV or PO as ordered and tolerated  - Nasogastric tube to low intermittent suction as ordered  - Evaluate effectiveness of ordered antiemetic medications  - Provide nonpharmacologic comfort measures as appropriate  - Advance diet as tolerated, if ordered  - Obtain nutritional consult as needed  - Evaluate fluid balance  Outcome: Progressing  Goal: Maintains or returns to baseline bowel function  Description: INTERVENTIONS:  - Assess bowel function  - Maintain adequate hydration with IV or PO as ordered and tolerated  - Evaluate effectiveness of GI medications  - Encourage mobilization and activity  - Obtain nutritional consult as needed  - Establish a toileting routine/schedule  - Consider collaborating with pharmacy to review patient's medication profile  Outcome: Progressing     Problem: METABOLIC/FLUID AND ELECTROLYTES - ADULT  Goal: Electrolytes maintained within normal limits  Description: INTERVENTIONS:  - Monitor labs and rhythm and assess patient for signs and symptoms of electrolyte imbalances  - Administer electrolyte replacement as ordered  - Monitor response to electrolyte replacements, including rhythm and repeat lab results as appropriate  - Fluid restriction as ordered  - Instruct patient on fluid and nutrition restrictions as appropriate  Outcome: Progressing

## 2022-12-05 RX ORDER — HYDROCODONE BITARTRATE AND ACETAMINOPHEN 5; 325 MG/1; MG/1
1 TABLET ORAL EVERY 6 HOURS PRN
Qty: 30 TABLET | Refills: 0 | Status: SHIPPED | OUTPATIENT
Start: 2022-12-05

## 2022-12-05 RX ORDER — ONDANSETRON 4 MG/1
4 TABLET, FILM COATED ORAL EVERY 8 HOURS PRN
Qty: 15 TABLET | Refills: 0 | Status: SHIPPED | OUTPATIENT
Start: 2022-12-05

## 2022-12-05 RX ORDER — IBUPROFEN 600 MG/1
600 TABLET ORAL EVERY 6 HOURS PRN
Status: DISCONTINUED | OUTPATIENT
Start: 2022-12-05 | End: 2022-12-06

## 2022-12-05 RX ORDER — ACETAMINOPHEN 500 MG
1000 TABLET ORAL EVERY 8 HOURS PRN
Status: DISCONTINUED | OUTPATIENT
Start: 2022-12-05 | End: 2022-12-06

## 2022-12-05 RX ORDER — POLYETHYLENE GLYCOL 3350 17 G/17G
17 POWDER, FOR SOLUTION ORAL DAILY
Qty: 14 PACKET | Refills: 0 | Status: SHIPPED | OUTPATIENT
Start: 2022-12-05 | End: 2022-12-19

## 2022-12-05 NOTE — PLAN OF CARE
Problem: Patient Centered Care  Goal: Patient preferences are identified and integrated in the patient's plan of care  Description: Interventions:  - What would you like us to know as we care for you?  From home with family  - Provide timely, complete, and accurate information to patient/family  - Incorporate patient and family knowledge, values, beliefs, and cultural backgrounds into the planning and delivery of care  - Encourage patient/family to participate in care and decision-making at the level they choose  - Honor patient and family perspectives and choices  Outcome: Progressing     Problem: Patient/Family Goals  Goal: Patient/Family Long Term Goal  Description: Patient's Long Term Goal: To manage condition, get better, and go home    Interventions:  -Monitor labs, results, and vitals  -Administer medication as prescribed and PRN  -Pain management  -Infection management and prevention  -Replace electrolyte per protocol  -I&Os  -Safety, diet, act katlyn, hygiene  -IVF  -IV abx  -Imaging/tests/procedures  -Follow plan of care  -Monitor for worsening condition or new onset of symptoms  - See additional Care Plan goals for specific interventions  Outcome: Progressing  Goal: Patient/Family Short Term Goal  Description: Patient's Short Term Goal: Pain and nausea management    Interventions:   -Assess pain and nausea level  -Encourage pt to notify of increasing pain and nausea  -Administer pain medication as prescribed and prn  -Provide non-pharmacological intervention as needed  -Follow plan of care  - See additional Care Plan goals for specific interventions  Outcome: Progressing     Problem: PAIN - ADULT  Goal: Verbalizes/displays adequate comfort level or patient's stated pain goal  Description: INTERVENTIONS:  - Encourage pt to monitor pain and request assistance  - Assess pain using appropriate pain scale  - Administer analgesics based on type and severity of pain and evaluate response  - Implement non-pharmacological measures as appropriate and evaluate response  - Consider cultural and social influences on pain and pain management  - Manage/alleviate anxiety  - Utilize distraction and/or relaxation techniques  - Monitor for opioid side effects  - Notify MD/LIP if interventions unsuccessful or patient reports new pain  - Anticipate increased pain with activity and pre-medicate as appropriate  Outcome: Progressing     Problem: SAFETY ADULT - FALL  Goal: Free from fall injury  Description: INTERVENTIONS:  - Assess pt frequently for physical needs  - Identify cognitive and physical deficits and behaviors that affect risk of falls.   - [place] fall precautions as indicated by assessment.  - Educate pt/family on patient safety including physical limitations  - Instruct pt to call for assistance with activity based on assessment  - Modify environment to reduce risk of injury  - Provide assistive devices as appropriate  - Consider OT/PT consult to assist with strengthening/mobility  - Encourage toileting schedule  Outcome: Progressing     Problem: DISCHARGE PLANNING  Goal: Discharge to home or other facility with appropriate resources  Description: INTERVENTIONS:  - Identify barriers to discharge w/pt and caregiver  - Include patient/family/discharge partner in discharge planning  - Arrange for needed discharge resources and transportation as appropriate  - Identify discharge learning needs (meds, wound care, etc)  - Arrange for interpreters to assist at discharge as needed  - Consider post-discharge preferences of patient/family/discharge partner  - Complete POLST form as appropriate  - Assess patient's ability to be responsible for managing their own health  - Refer to Case Management Department for coordinating discharge planning if the patient needs post-hospital services based on physician/LIP order or complex needs related to functional status, cognitive ability or social support system  Outcome: Progressing     Problem: GASTROINTESTINAL - ADULT  Goal: Minimal or absence of nausea and vomiting  Description: INTERVENTIONS:  - Maintain adequate hydration with IV or PO as ordered and tolerated  - Nasogastric tube to low intermittent suction as ordered  - Evaluate effectiveness of ordered antiemetic medications  - Provide nonpharmacologic comfort measures as appropriate  - Advance diet as tolerated, if ordered  - Obtain nutritional consult as needed  - Evaluate fluid balance  Outcome: Progressing  Goal: Maintains or returns to baseline bowel function  Description: INTERVENTIONS:  - Assess bowel function  - Maintain adequate hydration with IV or PO as ordered and tolerated  - Evaluate effectiveness of GI medications  - Encourage mobilization and activity  - Obtain nutritional consult as needed  - Establish a toileting routine/schedule  - Consider collaborating with pharmacy to review patient's medication profile  Outcome: Progressing     Problem: METABOLIC/FLUID AND ELECTROLYTES - ADULT  Goal: Electrolytes maintained within normal limits  Description: INTERVENTIONS:  - Monitor labs and rhythm and assess patient for signs and symptoms of electrolyte imbalances  - Administer electrolyte replacement as ordered  - Monitor response to electrolyte replacements, including rhythm and repeat lab results as appropriate  - Fluid restriction as ordered  - Instruct patient on fluid and nutrition restrictions as appropriate  Outcome: Progressing     No acute changes overnight. Abdominal dressing c/d/I, no indications of bleeding. RAJESH with serosang output. Tolerating diet, no complaints of nausea. Minimal amounts of pain, oxy given once. Zosyn infusing as abx coverage. Call light within reach, frequent rounding.

## 2022-12-06 VITALS
BODY MASS INDEX: 29.02 KG/M2 | DIASTOLIC BLOOD PRESSURE: 79 MMHG | SYSTOLIC BLOOD PRESSURE: 136 MMHG | RESPIRATION RATE: 16 BRPM | HEIGHT: 64 IN | OXYGEN SATURATION: 96 % | WEIGHT: 170 LBS | TEMPERATURE: 99 F | HEART RATE: 83 BPM

## 2022-12-06 RX ORDER — METRONIDAZOLE 500 MG/1
500 TABLET ORAL 3 TIMES DAILY
Qty: 21 TABLET | Refills: 0 | Status: SHIPPED | OUTPATIENT
Start: 2022-12-06

## 2022-12-06 RX ORDER — LEVOFLOXACIN 500 MG/1
500 TABLET, FILM COATED ORAL DAILY
Qty: 7 TABLET | Refills: 0 | Status: SHIPPED | OUTPATIENT
Start: 2022-12-06 | End: 2022-12-16

## 2022-12-06 NOTE — PLAN OF CARE
Problem: Patient Centered Care  Goal: Patient preferences are identified and integrated in the patient's plan of care  Description: Interventions:  - What would you like us to know as we care for you?  From home with family  - Provide timely, complete, and accurate information to patient/family  - Incorporate patient and family knowledge, values, beliefs, and cultural backgrounds into the planning and delivery of care  - Encourage patient/family to participate in care and decision-making at the level they choose  - Honor patient and family perspectives and choices  Outcome: Adequate for Discharge     Problem: Patient/Family Goals  Goal: Patient/Family Long Term Goal  Description: Patient's Long Term Goal: To manage condition, get better, and go home    Interventions:  -Monitor labs, results, and vitals  -Administer medication as prescribed and PRN  -Pain management  -Infection management and prevention  -Replace electrolyte per protocol  -I&Os  -Safety, diet, act katlyn, hygiene  -IVF  -IV abx  -Imaging/tests/procedures  -Follow plan of care  -Monitor for worsening condition or new onset of symptoms  - See additional Care Plan goals for specific interventions  Outcome: Adequate for Discharge  Goal: Patient/Family Short Term Goal  Description: Patient's Short Term Goal: Pain and nausea management    Interventions:   -Assess pain and nausea level  -Encourage pt to notify of increasing pain and nausea  -Administer pain medication as prescribed and prn  -Provide non-pharmacological intervention as needed  -Follow plan of care  - See additional Care Plan goals for specific interventions  Outcome: Adequate for Discharge     Problem: PAIN - ADULT  Goal: Verbalizes/displays adequate comfort level or patient's stated pain goal  Description: INTERVENTIONS:  - Encourage pt to monitor pain and request assistance  - Assess pain using appropriate pain scale  - Administer analgesics based on type and severity of pain and evaluate response  - Implement non-pharmacological measures as appropriate and evaluate response  - Consider cultural and social influences on pain and pain management  - Manage/alleviate anxiety  - Utilize distraction and/or relaxation techniques  - Monitor for opioid side effects  - Notify MD/LIP if interventions unsuccessful or patient reports new pain  - Anticipate increased pain with activity and pre-medicate as appropriate  Outcome: Adequate for Discharge     Problem: SAFETY ADULT - FALL  Goal: Free from fall injury  Description: INTERVENTIONS:  - Assess pt frequently for physical needs  - Identify cognitive and physical deficits and behaviors that affect risk of falls.   - [place] fall precautions as indicated by assessment.  - Educate pt/family on patient safety including physical limitations  - Instruct pt to call for assistance with activity based on assessment  - Modify environment to reduce risk of injury  - Provide assistive devices as appropriate  - Consider OT/PT consult to assist with strengthening/mobility  - Encourage toileting schedule  Outcome: Adequate for Discharge     Problem: DISCHARGE PLANNING  Goal: Discharge to home or other facility with appropriate resources  Description: INTERVENTIONS:  - Identify barriers to discharge w/pt and caregiver  - Include patient/family/discharge partner in discharge planning  - Arrange for needed discharge resources and transportation as appropriate  - Identify discharge learning needs (meds, wound care, etc)  - Arrange for interpreters to assist at discharge as needed  - Consider post-discharge preferences of patient/family/discharge partner  - Complete POLST form as appropriate  - Assess patient's ability to be responsible for managing their own health  - Refer to Case Management Department for coordinating discharge planning if the patient needs post-hospital services based on physician/LIP order or complex needs related to functional status, cognitive ability or social support system  Outcome: Adequate for Discharge     Problem: GASTROINTESTINAL - ADULT  Goal: Minimal or absence of nausea and vomiting  Description: INTERVENTIONS:  - Maintain adequate hydration with IV or PO as ordered and tolerated  - Nasogastric tube to low intermittent suction as ordered  - Evaluate effectiveness of ordered antiemetic medications  - Provide nonpharmacologic comfort measures as appropriate  - Advance diet as tolerated, if ordered  - Obtain nutritional consult as needed  - Evaluate fluid balance  Outcome: Adequate for Discharge  Goal: Maintains or returns to baseline bowel function  Description: INTERVENTIONS:  - Assess bowel function  - Maintain adequate hydration with IV or PO as ordered and tolerated  - Evaluate effectiveness of GI medications  - Encourage mobilization and activity  - Obtain nutritional consult as needed  - Establish a toileting routine/schedule  - Consider collaborating with pharmacy to review patient's medication profile  Outcome: Adequate for Discharge     Problem: METABOLIC/FLUID AND ELECTROLYTES - ADULT  Goal: Electrolytes maintained within normal limits  Description: INTERVENTIONS:  - Monitor labs and rhythm and assess patient for signs and symptoms of electrolyte imbalances  - Administer electrolyte replacement as ordered  - Monitor response to electrolyte replacements, including rhythm and repeat lab results as appropriate  - Fluid restriction as ordered  - Instruct patient on fluid and nutrition restrictions as appropriate  Outcome: Adequate for Discharge     Medically/surgically stable for discharge. Reports mild-moderate pain at surgical abdomen. Managed with prn extra strength tylenol. Education for drain management and patrick drain care provided. Will discharge with oral abx and followup with surgery outpatient.  Patient to receive flu vaccine prior to leaving

## 2022-12-06 NOTE — PLAN OF CARE
Problem: Patient Centered Care  Goal: Patient preferences are identified and integrated in the patient's plan of care  Description: Interventions:  - What would you like us to know as we care for you?  From home with family  - Provide timely, complete, and accurate information to patient/family  - Incorporate patient and family knowledge, values, beliefs, and cultural backgrounds into the planning and delivery of care  - Encourage patient/family to participate in care and decision-making at the level they choose  - Honor patient and family perspectives and choices  Outcome: Progressing     Problem: Patient/Family Goals  Goal: Patient/Family Long Term Goal  Description: Patient's Long Term Goal: To manage condition, get better, and go home    Interventions:  -Monitor labs, results, and vitals  -Administer medication as prescribed and PRN  -Pain management  -Infection management and prevention  -Replace electrolyte per protocol  -I&Os  -Safety, diet, act katlyn, hygiene  -IVF  -IV abx  -Imaging/tests/procedures  -Follow plan of care  -Monitor for worsening condition or new onset of symptoms  - See additional Care Plan goals for specific interventions  Outcome: Progressing  Goal: Patient/Family Short Term Goal  Description: Patient's Short Term Goal: Pain and nausea management    Interventions:   -Assess pain and nausea level  -Encourage pt to notify of increasing pain and nausea  -Administer pain medication as prescribed and prn  -Provide non-pharmacological intervention as needed  -Follow plan of care  - See additional Care Plan goals for specific interventions  Outcome: Progressing     Problem: PAIN - ADULT  Goal: Verbalizes/displays adequate comfort level or patient's stated pain goal  Description: INTERVENTIONS:  - Encourage pt to monitor pain and request assistance  - Assess pain using appropriate pain scale  - Administer analgesics based on type and severity of pain and evaluate response  - Implement non-pharmacological measures as appropriate and evaluate response  - Consider cultural and social influences on pain and pain management  - Manage/alleviate anxiety  - Utilize distraction and/or relaxation techniques  - Monitor for opioid side effects  - Notify MD/LIP if interventions unsuccessful or patient reports new pain  - Anticipate increased pain with activity and pre-medicate as appropriate  Outcome: Progressing     Problem: SAFETY ADULT - FALL  Goal: Free from fall injury  Description: INTERVENTIONS:  - Assess pt frequently for physical needs  - Identify cognitive and physical deficits and behaviors that affect risk of falls.   - [place] fall precautions as indicated by assessment.  - Educate pt/family on patient safety including physical limitations  - Instruct pt to call for assistance with activity based on assessment  - Modify environment to reduce risk of injury  - Provide assistive devices as appropriate  - Consider OT/PT consult to assist with strengthening/mobility  - Encourage toileting schedule  Outcome: Progressing     Problem: DISCHARGE PLANNING  Goal: Discharge to home or other facility with appropriate resources  Description: INTERVENTIONS:  - Identify barriers to discharge w/pt and caregiver  - Include patient/family/discharge partner in discharge planning  - Arrange for needed discharge resources and transportation as appropriate  - Identify discharge learning needs (meds, wound care, etc)  - Arrange for interpreters to assist at discharge as needed  - Consider post-discharge preferences of patient/family/discharge partner  - Complete POLST form as appropriate  - Assess patient's ability to be responsible for managing their own health  - Refer to Case Management Department for coordinating discharge planning if the patient needs post-hospital services based on physician/LIP order or complex needs related to functional status, cognitive ability or social support system  Outcome: Progressing     Problem: GASTROINTESTINAL - ADULT  Goal: Minimal or absence of nausea and vomiting  Description: INTERVENTIONS:  - Maintain adequate hydration with IV or PO as ordered and tolerated  - Nasogastric tube to low intermittent suction as ordered  - Evaluate effectiveness of ordered antiemetic medications  - Provide nonpharmacologic comfort measures as appropriate  - Advance diet as tolerated, if ordered  - Obtain nutritional consult as needed  - Evaluate fluid balance  Outcome: Progressing  Goal: Maintains or returns to baseline bowel function  Description: INTERVENTIONS:  - Assess bowel function  - Maintain adequate hydration with IV or PO as ordered and tolerated  - Evaluate effectiveness of GI medications  - Encourage mobilization and activity  - Obtain nutritional consult as needed  - Establish a toileting routine/schedule  - Consider collaborating with pharmacy to review patient's medication profile  Outcome: Progressing     Problem: METABOLIC/FLUID AND ELECTROLYTES - ADULT  Goal: Electrolytes maintained within normal limits  Description: INTERVENTIONS:  - Monitor labs and rhythm and assess patient for signs and symptoms of electrolyte imbalances  - Administer electrolyte replacement as ordered  - Monitor response to electrolyte replacements, including rhythm and repeat lab results as appropriate  - Fluid restriction as ordered  - Instruct patient on fluid and nutrition restrictions as appropriate  Outcome: Progressing     No changes overnight. Pain being managed with tylenol. Tolerating diet, bm last night. RAJESH drain with minimal output. Dressing is c/d/I. Possible discharge home today. Call light within reach, frequent rounding.

## 2022-12-06 NOTE — PLAN OF CARE
Problem: Patient Centered Care  Goal: Patient preferences are identified and integrated in the patient's plan of care  Description: Interventions:  - What would you like us to know as we care for you?  From home with family  - Provide timely, complete, and accurate information to patient/family  - Incorporate patient and family knowledge, values, beliefs, and cultural backgrounds into the planning and delivery of care  - Encourage patient/family to participate in care and decision-making at the level they choose  - Honor patient and family perspectives and choices  Outcome: Progressing     Problem: Patient/Family Goals  Goal: Patient/Family Long Term Goal  Description: Patient's Long Term Goal: To manage condition, get better, and go home    Interventions:  -Monitor labs, results, and vitals  -Administer medication as prescribed and PRN  -Pain management  -Infection management and prevention  -Replace electrolyte per protocol  -I&Os  -Safety, diet, act katlyn, hygiene  -IVF  -IV abx  -Imaging/tests/procedures  -Follow plan of care  -Monitor for worsening condition or new onset of symptoms  - See additional Care Plan goals for specific interventions  Outcome: Progressing  Goal: Patient/Family Short Term Goal  Description: Patient's Short Term Goal: Pain and nausea management    Interventions:   -Assess pain and nausea level  -Encourage pt to notify of increasing pain and nausea  -Administer pain medication as prescribed and prn  -Provide non-pharmacological intervention as needed  -Follow plan of care  - See additional Care Plan goals for specific interventions  Outcome: Progressing     Problem: PAIN - ADULT  Goal: Verbalizes/displays adequate comfort level or patient's stated pain goal  Description: INTERVENTIONS:  - Encourage pt to monitor pain and request assistance  - Assess pain using appropriate pain scale  - Administer analgesics based on type and severity of pain and evaluate response  - Implement non-pharmacological measures as appropriate and evaluate response  - Consider cultural and social influences on pain and pain management  - Manage/alleviate anxiety  - Utilize distraction and/or relaxation techniques  - Monitor for opioid side effects  - Notify MD/LIP if interventions unsuccessful or patient reports new pain  - Anticipate increased pain with activity and pre-medicate as appropriate  Outcome: Progressing     Problem: SAFETY ADULT - FALL  Goal: Free from fall injury  Description: INTERVENTIONS:  - Assess pt frequently for physical needs  - Identify cognitive and physical deficits and behaviors that affect risk of falls.   - [place] fall precautions as indicated by assessment.  - Educate pt/family on patient safety including physical limitations  - Instruct pt to call for assistance with activity based on assessment  - Modify environment to reduce risk of injury  - Provide assistive devices as appropriate  - Consider OT/PT consult to assist with strengthening/mobility  - Encourage toileting schedule  Outcome: Progressing     Problem: DISCHARGE PLANNING  Goal: Discharge to home or other facility with appropriate resources  Description: INTERVENTIONS:  - Identify barriers to discharge w/pt and caregiver  - Include patient/family/discharge partner in discharge planning  - Arrange for needed discharge resources and transportation as appropriate  - Identify discharge learning needs (meds, wound care, etc)  - Arrange for interpreters to assist at discharge as needed  - Consider post-discharge preferences of patient/family/discharge partner  - Complete POLST form as appropriate  - Assess patient's ability to be responsible for managing their own health  - Refer to Case Management Department for coordinating discharge planning if the patient needs post-hospital services based on physician/LIP order or complex needs related to functional status, cognitive ability or social support system  Outcome: Progressing     Problem: GASTROINTESTINAL - ADULT  Goal: Minimal or absence of nausea and vomiting  Description: INTERVENTIONS:  - Maintain adequate hydration with IV or PO as ordered and tolerated  - Nasogastric tube to low intermittent suction as ordered  - Evaluate effectiveness of ordered antiemetic medications  - Provide nonpharmacologic comfort measures as appropriate  - Advance diet as tolerated, if ordered  - Obtain nutritional consult as needed  - Evaluate fluid balance  Outcome: Progressing  Goal: Maintains or returns to baseline bowel function  Description: INTERVENTIONS:  - Assess bowel function  - Maintain adequate hydration with IV or PO as ordered and tolerated  - Evaluate effectiveness of GI medications  - Encourage mobilization and activity  - Obtain nutritional consult as needed  - Establish a toileting routine/schedule  - Consider collaborating with pharmacy to review patient's medication profile  Outcome: Progressing     Problem: METABOLIC/FLUID AND ELECTROLYTES - ADULT  Goal: Electrolytes maintained within normal limits  Description: INTERVENTIONS:  - Monitor labs and rhythm and assess patient for signs and symptoms of electrolyte imbalances  - Administer electrolyte replacement as ordered  - Monitor response to electrolyte replacements, including rhythm and repeat lab results as appropriate  - Fluid restriction as ordered  - Instruct patient on fluid and nutrition restrictions as appropriate  Outcome: Progressing     Alert and oriented x4. Reports mild-moderate pain at surgical abdomen. Pain managed with prn oral oxy. Patient reporting adverse side-effects, will try oral tylenol and ibuprofen. IV fluids/IV abx. Tolerating low-fiber diet. RAJESH drain, + serosanguineous output. Possibly discharge home tomorrow pending surgical clearance.

## 2022-12-07 ENCOUNTER — PATIENT OUTREACH (OUTPATIENT)
Dept: CASE MANAGEMENT | Age: 52
End: 2022-12-07

## 2022-12-07 NOTE — PROGRESS NOTES
Called pt's listed cell #--person answered--\"you have the wrong number. \"    Called work # to verify if there was another # to reach pt-- stated pt was not listed in work directory.

## 2022-12-07 NOTE — PAYOR COMM NOTE
--------------  DISCHARGE REVIEW    Riki Collier #:  [id number]  Authorization Number: [id number]    Admit date: 11/28/22  Admit time:   3:58 AM  Discharge Date: 12/6/2022  2:54 PM     Admitting Physician: Vamsi Wiggins MD  Attending Physician:  No att. providers found  Primary Care Physician: Calla Rubinstein, MD       Discharge Summary Notes    No notes of this type exist for this encounter.

## 2022-12-07 NOTE — PROGRESS NOTES
Left message on mailbox for pt to call NCM back for TCM. NCM contact information included in message. Clarify if outside PCP--never established care with Dr. Raul Mayes from 10/17/2018 ER visit--current insurance out of network. .       Follow up appointments:     Follow up With Specialties Details Why Contact Info   Yue Monahan MD SURGERY, GENERAL Call in 1 week(s) RA[facility]ESH drain [facility] [address]   [address]   [phone]    Urmila Del Rosario MD Internal Medicine Schedule an appointment as soon as possible for a visit in 1 week(s561 [phone] [employer]   [phone]

## 2022-12-09 ENCOUNTER — PATIENT OUTREACH (OUTPATIENT)
Dept: CASE MANAGEMENT | Age: 52
End: 2022-12-09

## 2022-12-09 NOTE — TELEPHONE ENCOUNTER
CBLM to schedule pt's procedure. Please transfer to [facility] at ext [phone] for scheduling.     HOLD placed on 12/27/22 at 12:30 pm.

## 2022-12-09 NOTE — PROGRESS NOTES
VM received; pt requesting assistance w/scheduling apt (dc 12/06)    Dr Sarita Moon  [address] [facility]  [phone]  Follow up 1 week

## 2022-12-12 ENCOUNTER — TELEPHONE (OUTPATIENT)
Dept: SURGERY | Facility: CLINIC | Age: 52
End: 2022-12-12

## 2022-12-12 NOTE — TELEPHONE ENCOUNTER
Patient states she was in hospital and Dr. Franklyn Deng inserted a drainage and it needs to be removed tomorrow. No appointments available.  Please advise

## 2022-12-13 ENCOUNTER — TELEPHONE (OUTPATIENT)
Dept: SURGERY | Facility: CLINIC | Age: 52
End: 2022-12-13

## 2022-12-13 ENCOUNTER — OFFICE VISIT (OUTPATIENT)
Dept: SURGERY | Facility: CLINIC | Age: 52
End: 2022-12-13
Payer: COMMERCIAL

## 2022-12-13 DIAGNOSIS — Z09 POSTOPERATIVE EXAMINATION: Primary | ICD-10-CM

## 2022-12-13 PROCEDURE — 99024 POSTOP FOLLOW-UP VISIT: CPT | Performed by: SURGERY

## 2022-12-13 NOTE — PROGRESS NOTES
Patient presents with:  Post-Op: Pt here for post op s/p lap danica on 12/2/2022. Pt states here for drain removal and possible staple removal.  Pt noticed some drainage near upper staples. O:  LMP 10/17/2018   GEN:  No acute distress  Abd:  Soft, NTND, incision C/D/I with fullness consistent with known subcutaneous hematoma. RAJESH serous. Path:  Reviewed w pt    Assessment   Postoperative examination  (primary encounter diagnosis)    Doing well sp open Danica. Doing well. RAJESH removed. Will keep staples in longer due to subcutaneous hematoma. Continue to avoid heavy lifting for another month.   F/u 4 weeks for staple removal.         Tyrelserafin Burns MD

## 2022-12-13 NOTE — TELEPHONE ENCOUNTER
Patient brought FMLA forms to be filled out. The DEADLINE DATE is 12/18/2022. No fees were collected. Pt will  the forms. She can best be reach by phone [phone]. when the forms are ready.

## 2022-12-13 NOTE — TELEPHONE ENCOUNTER
Carlito Banda attempted to call patient yesterday and it was the wrong number.   Patient called today and was given a post op appt for today for drain removal.  Will verify number upon arrival.

## 2022-12-14 NOTE — TELEPHONE ENCOUNTER
Disab forms received and logged for processing. [employer] message sent to pt regarding [place].

## 2022-12-20 NOTE — TELEPHONE ENCOUNTER
Dr. Mercedez Dinh,    **2 forms needing signature**     Please sign off on form: Disab and Fmla 11/28/22-1/22/23 rtw 1/23/23  -Highlight the patient and hit \"Chart\" button. -In Chart Review, w/in the Encounter tab - click 1 time on the Telephone call encounter for 12/13/22 Scroll down the telephone encounter.  -Click \"scan on\" blue Hyperlink under \"Media\" heading for Disab Dr Mercedez Dinh 12/20/22 Fmjn Dinh 12/20/22 w/in the telephone enc.  -Click on Acknowledge button at the bottom right corner and left-click onto image, signature stamp appears and drag signature to Provider signature line. Stamp will turn blue. Close window. Thank you,    Nicholas Mao.

## 2022-12-30 ENCOUNTER — TELEPHONE (OUTPATIENT)
Dept: SURGERY | Facility: CLINIC | Age: 52
End: 2022-12-30

## 2022-12-30 NOTE — TELEPHONE ENCOUNTER
Pt called stating pt had surgery 12-9-22. Bottom part of surgical site is not closed. Pt's post op appointment on 1-3-23 was canceled. Should pt go to the emergency room. Please call.   Pt aware office closes Friday 12-30-22 at 5:00pm and returns Tuesday 1-3-23 at 8:00 am.

## 2023-01-06 ENCOUNTER — HOSPITAL ENCOUNTER (OUTPATIENT)
Facility: HOSPITAL | Age: 53
Setting detail: OBSERVATION
Discharge: HOME | End: 2023-01-06
Attending: STUDENT IN AN ORGANIZED HEALTH CARE EDUCATION/TRAINING PROGRAM | Admitting: INTERNAL MEDICINE
Payer: COMMERCIAL

## 2023-01-06 VITALS
HEIGHT: 64 IN | RESPIRATION RATE: 22 BRPM | HEART RATE: 85 BPM | SYSTOLIC BLOOD PRESSURE: 151 MMHG | TEMPERATURE: 98 F | OXYGEN SATURATION: 99 % | DIASTOLIC BLOOD PRESSURE: 93 MMHG | BODY MASS INDEX: 26.69 KG/M2 | WEIGHT: 156.31 LBS

## 2023-01-06 DIAGNOSIS — Z48.89 ENCOUNTER FOR POSTOPERATIVE WOUND CARE: Primary | ICD-10-CM

## 2023-01-06 PROBLEM — T81.30XA WOUND DEHISCENCE: Status: ACTIVE | Noted: 2023-01-06

## 2023-01-06 LAB
ALBUMIN SERPL-MCNC: 2 G/DL (ref 3.4–5)
ALBUMIN/GLOB SERPL: 0.3 {RATIO} (ref 1–2)
ALP LIVER SERPL-CCNC: 968 U/L
ALT SERPL-CCNC: 87 U/L
ANION GAP SERPL CALC-SCNC: 6 MMOL/L (ref 0–18)
AST SERPL-CCNC: 130 U/L (ref 15–37)
BASOPHILS # BLD AUTO: 0.04 X10(3) UL (ref 0–0.2)
BASOPHILS NFR BLD AUTO: 0.4 %
BILIRUB SERPL-MCNC: 7 MG/DL (ref 0.1–2)
BUN BLD-MCNC: 11 MG/DL (ref 7–18)
BUN/CREAT SERPL: 12.1 (ref 10–20)
CALCIUM BLD-MCNC: 8.6 MG/DL (ref 8.5–10.1)
CHLORIDE SERPL-SCNC: 106 MMOL/L (ref 98–112)
CO2 SERPL-SCNC: 24 MMOL/L (ref 21–32)
CREAT BLD-MCNC: 0.91 MG/DL
DEPRECATED RDW RBC AUTO: 51.1 FL (ref 35.1–46.3)
EOSINOPHIL # BLD AUTO: 0 X10(3) UL (ref 0–0.7)
EOSINOPHIL NFR BLD AUTO: 0 %
ERYTHROCYTE [DISTWIDTH] IN BLOOD BY AUTOMATED COUNT: 14.7 % (ref 11–15)
GFR SERPLBLD BASED ON 1.73 SQ M-ARVRAT: 75 ML/MIN/1.73M2 (ref 60–?)
GLOBULIN PLAS-MCNC: 7.5 G/DL (ref 2.8–4.4)
GLUCOSE BLD-MCNC: 111 MG/DL (ref 70–99)
HCT VFR BLD AUTO: 32.4 %
HGB BLD-MCNC: 10.1 G/DL
IMM GRANULOCYTES # BLD AUTO: 0.06 X10(3) UL (ref 0–1)
IMM GRANULOCYTES NFR BLD: 0.5 %
LYMPHOCYTES # BLD AUTO: 2.81 X10(3) UL (ref 1–4)
LYMPHOCYTES NFR BLD AUTO: 24.8 %
MCH RBC QN AUTO: 29.7 PG (ref 26–34)
MCHC RBC AUTO-ENTMCNC: 31.2 G/DL (ref 31–37)
MCV RBC AUTO: 95.3 FL
MONOCYTES # BLD AUTO: 0.68 X10(3) UL (ref 0.1–1)
MONOCYTES NFR BLD AUTO: 6 %
NEUTROPHILS # BLD AUTO: 7.74 X10 (3) UL (ref 1.5–7.7)
NEUTROPHILS # BLD AUTO: 7.74 X10(3) UL (ref 1.5–7.7)
NEUTROPHILS NFR BLD AUTO: 68.3 %
OSMOLALITY SERPL CALC.SUM OF ELEC: 282 MOSM/KG (ref 275–295)
PLATELET # BLD AUTO: 479 10(3)UL (ref 150–450)
POTASSIUM SERPL-SCNC: 4.2 MMOL/L (ref 3.5–5.1)
PROT SERPL-MCNC: 9.5 G/DL (ref 6.4–8.2)
RBC # BLD AUTO: 3.4 X10(6)UL
SARS-COV-2 RNA RESP QL NAA+PROBE: NOT DETECTED
SODIUM SERPL-SCNC: 136 MMOL/L (ref 136–145)
WBC # BLD AUTO: 11.3 X10(3) UL (ref 4–11)

## 2023-01-06 PROCEDURE — 99024 POSTOP FOLLOW-UP VISIT: CPT | Performed by: SURGERY

## 2023-01-06 RX ORDER — MORPHINE SULFATE 2 MG/ML
2 INJECTION, SOLUTION INTRAMUSCULAR; INTRAVENOUS EVERY 2 HOUR PRN
Status: DISCONTINUED | OUTPATIENT
Start: 2023-01-06 | End: 2023-01-06

## 2023-01-06 RX ORDER — MORPHINE SULFATE 2 MG/ML
1 INJECTION, SOLUTION INTRAMUSCULAR; INTRAVENOUS EVERY 2 HOUR PRN
Status: DISCONTINUED | OUTPATIENT
Start: 2023-01-06 | End: 2023-01-06

## 2023-01-06 RX ORDER — PROCHLORPERAZINE EDISYLATE 5 MG/ML
5 INJECTION INTRAMUSCULAR; INTRAVENOUS EVERY 8 HOURS PRN
Status: DISCONTINUED | OUTPATIENT
Start: 2023-01-06 | End: 2023-01-06

## 2023-01-06 RX ORDER — ENOXAPARIN SODIUM 100 MG/ML
40 INJECTION SUBCUTANEOUS DAILY
Status: DISCONTINUED | OUTPATIENT
Start: 2023-01-06 | End: 2023-01-06

## 2023-01-06 RX ORDER — ONDANSETRON 2 MG/ML
4 INJECTION INTRAMUSCULAR; INTRAVENOUS EVERY 4 HOURS PRN
Status: DISCONTINUED | OUTPATIENT
Start: 2023-01-06 | End: 2023-01-06

## 2023-01-06 RX ORDER — SODIUM CHLORIDE 9 MG/ML
INJECTION, SOLUTION INTRAVENOUS CONTINUOUS
Status: DISCONTINUED | OUTPATIENT
Start: 2023-01-06 | End: 2023-01-06

## 2023-01-06 RX ORDER — MORPHINE SULFATE 4 MG/ML
4 INJECTION, SOLUTION INTRAMUSCULAR; INTRAVENOUS EVERY 2 HOUR PRN
Status: DISCONTINUED | OUTPATIENT
Start: 2023-01-06 | End: 2023-01-06

## 2023-01-06 RX ORDER — ACETAMINOPHEN 500 MG
500 TABLET ORAL EVERY 4 HOURS PRN
Status: DISCONTINUED | OUTPATIENT
Start: 2023-01-06 | End: 2023-01-06

## 2023-01-06 RX ORDER — ONDANSETRON 2 MG/ML
4 INJECTION INTRAMUSCULAR; INTRAVENOUS EVERY 6 HOURS PRN
Status: DISCONTINUED | OUTPATIENT
Start: 2023-01-06 | End: 2023-01-06

## 2023-01-06 RX ORDER — MORPHINE SULFATE 2 MG/ML
2 INJECTION, SOLUTION INTRAMUSCULAR; INTRAVENOUS EVERY 4 HOURS PRN
Status: DISCONTINUED | OUTPATIENT
Start: 2023-01-06 | End: 2023-01-06

## 2023-01-06 RX ORDER — ACETAMINOPHEN 500 MG
1000 TABLET ORAL ONCE
Status: COMPLETED | OUTPATIENT
Start: 2023-01-06 | End: 2023-01-06

## 2023-01-06 NOTE — ED INITIAL ASSESSMENT (HPI)
Pt had her gallbladder removed on 12/08, notice last week the incision site opened up and the staples have been falling out, denies fever

## 2023-01-06 NOTE — ED QUICK NOTES
.Orders for admission, patient is aware of plan and ready to go upstairs.  Any questions, please call ED SARA Riggins  at extension [phone]  Type of COVID test sent:rapid  COVID Suspicion level: Low/High - low    Titratable drug(s) infusing:non  Rate:    LOC at time of transport:A&Ox4    Other pertinent information    CIWA score=  NIH score=

## 2023-01-07 NOTE — PLAN OF CARE
Problem: Patient Centered Care  Goal: Patient preferences are identified and integrated in the patient's plan of care  Description: Interventions:  - What would you like us to know as we care for you? Lives at home with family  - Provide timely, complete, and accurate information to patient/family  - Incorporate patient and family knowledge, values, beliefs, and cultural backgrounds into the planning and delivery of care  - Encourage patient/family to participate in care and decision-making at the level they choose  - Honor patient and family perspectives and choices  Outcome: Adequate for Discharge     Problem: Patient/Family Goals  Goal: Patient/Family Long Term Goal  Description: Patient's Long Term Goal: go home    Interventions:  - medications as ordered  - wound dressing  - See additional Care Plan goals for specific interventions  Outcome: Adequate for Discharge  Goal: Patient/Family Short Term Goal  Description: Patient's Short Term Goal: go home    Interventions:   - wound dressing changes  - administer medication as needed  - See additional Care Plan goals for specific interventions  Outcome: Adequate for Discharge   Patient denies any complaint of pain. Seen by Dr. Cheryl Herman, staples removed by MD and redressed. Per MD patient can go home. Seen by Dr. Elizabeth Marques, discharge order received. Saline lock discontinued. VSS stable. Discharge instructions given to patient, instructions includes follow up appointments and dressing changes as per MD order and instruction. Patient verbalized understanding of all instructions. Patient discharged home ambulatory and in stable condition.

## 2023-01-09 ENCOUNTER — PATIENT OUTREACH (OUTPATIENT)
Dept: CASE MANAGEMENT | Age: 53
End: 2023-01-09

## 2023-01-17 ENCOUNTER — OFFICE VISIT (OUTPATIENT)
Dept: SURGERY | Facility: CLINIC | Age: 53
End: 2023-01-17

## 2023-01-17 VITALS — BODY MASS INDEX: 27 KG/M2 | WEIGHT: 156 LBS

## 2023-01-17 DIAGNOSIS — T14.8XXA OPEN WOUND: Primary | ICD-10-CM

## 2023-01-17 PROCEDURE — 99024 POSTOP FOLLOW-UP VISIT: CPT | Performed by: SURGERY

## 2023-03-01 ENCOUNTER — TELEPHONE (OUTPATIENT)
Dept: SURGERY | Facility: CLINIC | Age: 53
End: 2023-03-01

## 2023-03-28 NOTE — TELEPHONE ENCOUNTER
GI RN's - do we know if this pt ever had her ERCP done? Per Dr. Martha Rivera notes below, the pt needed to be seen within 4 weeks of 11/28/22. I tried calling the pt (x2) to discuss, no answer. Please advise. Thanks!

## 2023-03-29 NOTE — TELEPHONE ENCOUNTER
Dr Zac Joyner,    Gallbladder removed  12/02/2022    Does pancreatic stent still need to be removed? ?

## 2023-03-30 NOTE — TELEPHONE ENCOUNTER
Thank you Sydnee Vernon. Very important to get those stents out before they cause scarring. 4/11/2023 would be absolutely perfect.

## 2023-03-30 NOTE — TELEPHONE ENCOUNTER
Thanks all. Yes, very important detail -- those stents do NOT come out with the surgery. Ms May Marin still needs to be scheduled for EGD examination [facility] only to remove the stents.     - cb

## 2023-03-30 NOTE — TELEPHONE ENCOUNTER
CBLM to schedule pt's procedure. Left detailed message on pt's VM box regarding the need for this procedure. Per [place] in Endo, ok to add on 4/11/23 at 3:30 pm. HOLD placed. Please transfer to [place] or YUKI saini at ext [phone] for scheduling.

## 2023-05-09 NOTE — TELEPHONE ENCOUNTER
This is the third attempt to reach this pt in regards to scheduling with NO success scheduling ERCP. LMTCB    Letter sent via [employer] and mail. TE closed.

## 2023-06-14 ENCOUNTER — TELEPHONE (OUTPATIENT)
Facility: CLINIC | Age: 53
End: 2023-06-14

## 2023-06-14 DIAGNOSIS — K83.9 BILE DUCT ABNORMALITY: Primary | ICD-10-CM

## 2023-06-14 DIAGNOSIS — Z46.89 ENCOUNTER FOR REMOVAL OF BILIARY STENT: ICD-10-CM

## 2023-06-14 DIAGNOSIS — Z46.89 ENCOUNTER FOR REMOVAL OF PANCREATIC STENT: ICD-10-CM

## 2023-06-15 ENCOUNTER — HOSPITAL ENCOUNTER (OUTPATIENT)
Dept: GENERAL RADIOLOGY | Facility: HOSPITAL | Age: 53
Discharge: HOME | End: 2023-06-15
Attending: INTERNAL MEDICINE
Payer: COMMERCIAL

## 2023-06-15 DIAGNOSIS — Z46.89 ENCOUNTER FOR REMOVAL OF BILIARY STENT: ICD-10-CM

## 2023-06-15 DIAGNOSIS — K83.9 BILE DUCT ABNORMALITY: ICD-10-CM

## 2023-06-15 DIAGNOSIS — Z46.89 ENCOUNTER FOR REMOVAL OF PANCREATIC STENT: ICD-10-CM

## 2023-06-15 PROCEDURE — 74018 RADEX ABDOMEN 1 VIEW: CPT | Performed by: INTERNAL MEDICINE

## 2023-06-27 ENCOUNTER — ANESTHESIA (OUTPATIENT)
Dept: ENDOSCOPY | Facility: HOSPITAL | Age: 53
End: 2023-06-27
Payer: COMMERCIAL

## 2023-06-27 ENCOUNTER — ANESTHESIA EVENT (OUTPATIENT)
Dept: ENDOSCOPY | Facility: HOSPITAL | Age: 53
End: 2023-06-27
Payer: COMMERCIAL

## 2023-06-27 ENCOUNTER — APPOINTMENT (OUTPATIENT)
Dept: GENERAL RADIOLOGY | Facility: HOSPITAL | Age: 53
End: 2023-06-27
Attending: INTERNAL MEDICINE
Payer: COMMERCIAL

## 2023-06-27 ENCOUNTER — HOSPITAL ENCOUNTER (OUTPATIENT)
Facility: HOSPITAL | Age: 53
Setting detail: HOSPITAL OUTPATIENT SURGERY
Discharge: HOME | End: 2023-06-27
Attending: INTERNAL MEDICINE | Admitting: INTERNAL MEDICINE
Payer: COMMERCIAL

## 2023-06-27 ENCOUNTER — TELEPHONE (OUTPATIENT)
Dept: GASTROENTEROLOGY | Facility: CLINIC | Age: 53
End: 2023-06-27

## 2023-06-27 VITALS
HEIGHT: 64 IN | OXYGEN SATURATION: 100 % | WEIGHT: 150 LBS | TEMPERATURE: 98 F | RESPIRATION RATE: 21 BRPM | HEART RATE: 65 BPM | SYSTOLIC BLOOD PRESSURE: 144 MMHG | DIASTOLIC BLOOD PRESSURE: 90 MMHG | BODY MASS INDEX: 25.61 KG/M2

## 2023-06-27 DIAGNOSIS — Z12.11 SCREEN FOR COLON CANCER: ICD-10-CM

## 2023-06-27 DIAGNOSIS — K83.1 OBSTRUCTIVE JAUNDICE: ICD-10-CM

## 2023-06-27 DIAGNOSIS — D64.9 ANEMIA, UNSPECIFIED TYPE: Primary | ICD-10-CM

## 2023-06-27 DIAGNOSIS — R79.89 ABNORMAL LFTS (LIVER FUNCTION TESTS): ICD-10-CM

## 2023-06-27 LAB
ALBUMIN SERPL-MCNC: 1.8 G/DL (ref 3.4–5)
ALBUMIN/GLOB SERPL: 0.3 {RATIO} (ref 1–2)
ALP LIVER SERPL-CCNC: 752 U/L
ALT SERPL-CCNC: 97 U/L
ANION GAP SERPL CALC-SCNC: 7 MMOL/L (ref 0–18)
AST SERPL-CCNC: 153 U/L (ref 15–37)
BASOPHILS # BLD AUTO: 0.02 X10(3) UL (ref 0–0.2)
BASOPHILS NFR BLD AUTO: 0.3 %
BILIRUB SERPL-MCNC: 13.2 MG/DL (ref 0.1–2)
BUN BLD-MCNC: 8 MG/DL (ref 7–18)
BUN/CREAT SERPL: 12.7 (ref 10–20)
CALCIUM BLD-MCNC: 8.4 MG/DL (ref 8.5–10.1)
CHLORIDE SERPL-SCNC: 108 MMOL/L (ref 98–112)
CO2 SERPL-SCNC: 25 MMOL/L (ref 21–32)
CREAT BLD-MCNC: 0.63 MG/DL
DEPRECATED RDW RBC AUTO: 55.3 FL (ref 35.1–46.3)
EOSINOPHIL # BLD AUTO: 0 X10(3) UL (ref 0–0.7)
EOSINOPHIL NFR BLD AUTO: 0 %
ERYTHROCYTE [DISTWIDTH] IN BLOOD BY AUTOMATED COUNT: 17.2 % (ref 11–15)
GFR SERPLBLD BASED ON 1.73 SQ M-ARVRAT: 105 ML/MIN/1.73M2 (ref 60–?)
GLOBULIN PLAS-MCNC: 5.9 G/DL (ref 2.8–4.4)
GLUCOSE BLD-MCNC: 95 MG/DL (ref 70–99)
HCT VFR BLD AUTO: 29.9 %
HGB BLD-MCNC: 9.8 G/DL
IMM GRANULOCYTES # BLD AUTO: 0.03 X10(3) UL (ref 0–1)
IMM GRANULOCYTES NFR BLD: 0.4 %
LYMPHOCYTES # BLD AUTO: 1.2 X10(3) UL (ref 1–4)
LYMPHOCYTES NFR BLD AUTO: 16.6 %
MCH RBC QN AUTO: 28.7 PG (ref 26–34)
MCHC RBC AUTO-ENTMCNC: 32.8 G/DL (ref 31–37)
MCV RBC AUTO: 87.7 FL
MONOCYTES # BLD AUTO: 0.39 X10(3) UL (ref 0.1–1)
MONOCYTES NFR BLD AUTO: 5.4 %
NEUTROPHILS # BLD AUTO: 5.61 X10 (3) UL (ref 1.5–7.7)
NEUTROPHILS # BLD AUTO: 5.61 X10(3) UL (ref 1.5–7.7)
NEUTROPHILS NFR BLD AUTO: 77.3 %
OSMOLALITY SERPL CALC.SUM OF ELEC: 288 MOSM/KG (ref 275–295)
PLATELET # BLD AUTO: 312 10(3)UL (ref 150–450)
POTASSIUM SERPL-SCNC: 3.5 MMOL/L (ref 3.5–5.1)
PROT SERPL-MCNC: 7.7 G/DL (ref 6.4–8.2)
RBC # BLD AUTO: 3.41 X10(6)UL
SODIUM SERPL-SCNC: 140 MMOL/L (ref 136–145)
WBC # BLD AUTO: 7.3 X10(3) UL (ref 4–11)

## 2023-06-27 PROCEDURE — 0F798ZZ DILATION OF COMMON BILE DUCT, VIA NATURAL OR ARTIFICIAL OPENING ENDOSCOPIC: ICD-10-PCS | Performed by: INTERNAL MEDICINE

## 2023-06-27 PROCEDURE — 74330 X-RAY BILE/PANC ENDOSCOPY: CPT | Performed by: INTERNAL MEDICINE

## 2023-06-27 PROCEDURE — 43275 ERCP REMOVE FORGN BODY DUCT: CPT | Performed by: INTERNAL MEDICINE

## 2023-06-27 PROCEDURE — 0FC98ZZ EXTIRPATION OF MATTER FROM COMMON BILE DUCT, VIA NATURAL OR ARTIFICIAL OPENING ENDOSCOPIC: ICD-10-PCS | Performed by: INTERNAL MEDICINE

## 2023-06-27 PROCEDURE — 0FPB8DZ REMOVAL OF INTRALUMINAL DEVICE FROM HEPATOBILIARY DUCT, VIA NATURAL OR ARTIFICIAL OPENING ENDOSCOPIC: ICD-10-PCS | Performed by: INTERNAL MEDICINE

## 2023-06-27 RX ORDER — LIDOCAINE HYDROCHLORIDE 10 MG/ML
INJECTION, SOLUTION EPIDURAL; INFILTRATION; INTRACAUDAL; PERINEURAL AS NEEDED
Status: DISCONTINUED | OUTPATIENT
Start: 2023-06-27 | End: 2023-06-27 | Stop reason: SURG

## 2023-06-27 RX ORDER — NALOXONE HYDROCHLORIDE 0.4 MG/ML
80 INJECTION, SOLUTION INTRAMUSCULAR; INTRAVENOUS; SUBCUTANEOUS AS NEEDED
OUTPATIENT
Start: 2023-06-27 | End: 2023-06-27

## 2023-06-27 RX ORDER — SODIUM CHLORIDE, SODIUM LACTATE, POTASSIUM CHLORIDE, CALCIUM CHLORIDE 600; 310; 30; 20 MG/100ML; MG/100ML; MG/100ML; MG/100ML
INJECTION, SOLUTION INTRAVENOUS CONTINUOUS
OUTPATIENT
Start: 2023-06-27

## 2023-06-27 RX ORDER — PHENYLEPHRINE HCL 10 MG/ML
VIAL (ML) INJECTION AS NEEDED
Status: DISCONTINUED | OUTPATIENT
Start: 2023-06-27 | End: 2023-06-27 | Stop reason: SURG

## 2023-06-27 RX ORDER — SODIUM CHLORIDE, SODIUM LACTATE, POTASSIUM CHLORIDE, CALCIUM CHLORIDE 600; 310; 30; 20 MG/100ML; MG/100ML; MG/100ML; MG/100ML
INJECTION, SOLUTION INTRAVENOUS CONTINUOUS
Status: DISCONTINUED | OUTPATIENT
Start: 2023-06-27 | End: 2023-06-27

## 2023-06-27 RX ORDER — GLYCOPYRROLATE 0.2 MG/ML
INJECTION, SOLUTION INTRAMUSCULAR; INTRAVENOUS AS NEEDED
Status: DISCONTINUED | OUTPATIENT
Start: 2023-06-27 | End: 2023-06-27 | Stop reason: SURG

## 2023-06-27 RX ADMIN — PHENYLEPHRINE HCL 50 MCG: 10 MG/ML VIAL (ML) INJECTION at 14:08:00

## 2023-06-27 RX ADMIN — SODIUM CHLORIDE, SODIUM LACTATE, POTASSIUM CHLORIDE, CALCIUM CHLORIDE: 600; 310; 30; 20 INJECTION, SOLUTION INTRAVENOUS at 14:35:00

## 2023-06-27 RX ADMIN — SODIUM CHLORIDE, SODIUM LACTATE, POTASSIUM CHLORIDE, CALCIUM CHLORIDE: 600; 310; 30; 20 INJECTION, SOLUTION INTRAVENOUS at 14:32:00

## 2023-06-27 RX ADMIN — GLYCOPYRROLATE 0.2 MG: 0.2 INJECTION, SOLUTION INTRAMUSCULAR; INTRAVENOUS at 14:02:00

## 2023-06-27 RX ADMIN — SODIUM CHLORIDE, SODIUM LACTATE, POTASSIUM CHLORIDE, CALCIUM CHLORIDE: 600; 310; 30; 20 INJECTION, SOLUTION INTRAVENOUS at 13:59:00

## 2023-06-27 RX ADMIN — LIDOCAINE HYDROCHLORIDE 100 MG: 10 INJECTION, SOLUTION EPIDURAL; INFILTRATION; INTRACAUDAL; PERINEURAL at 14:05:00

## 2023-06-27 NOTE — TELEPHONE ENCOUNTER
GI RNs,    Please call Ms. Nunn to check on her this week. I performed ERCP procedure to remove a plugged bile duct stent from her today. She was becoming jaundiced. Please ask her to return for a CMP in 2-4 weeks which I can order. Please advise and offer to schedule her a routine screening colonoscopy examination which she needs.     - micah

## 2023-06-30 ENCOUNTER — MED REC SCAN ONLY (OUTPATIENT)
Facility: CLINIC | Age: 53
End: 2023-06-30

## 2023-07-06 NOTE — TELEPHONE ENCOUNTER
Dr Tam Greene    I spoke to [facility],    She is feeling great! She is not experiencing any pain    She will go for blood work at the end of next week if you would like to order the CMP & CBC    She is willing to have the screening colonoscopy. She currently takes no medications, pharmacy verified, no heart disease or hx of stroke.     Please provide orders    5'4\"/150/25.37

## 2023-07-07 RX ORDER — POLYETHYLENE GLYCOL 3350, SODIUM SULFATE ANHYDROUS, SODIUM BICARBONATE, SODIUM CHLORIDE, POTASSIUM CHLORIDE 236; 22.74; 6.74; 5.86; 2.97 G/4L; G/4L; G/4L; G/4L; G/4L
4 POWDER, FOR SOLUTION ORAL ONCE
Qty: 1 EACH | Refills: 0 | Status: SHIPPED | OUTPATIENT
Start: 2023-07-07 | End: 2023-07-07

## 2023-07-11 NOTE — TELEPHONE ENCOUNTER
Scheduled for:  Colonoscopy Deputy  Provider Name:  Albert Sherwood  Date:  12/12/2023  Location:  [facility]  Sedation:  Mac  Time:  1:30pm (pt is aware to arrive at 12:30pm    Prep:  golytely  Meds/Allergies Reconciled?:  yes    Diagnosis with codes:   screening colonoscopy examination z12.11  Was patient informed to call insurance with codes (Y/N):  yes     Referral sent?:  Referral was sent at the time of electronic surgical scheduling. [address] [facility] or [address] [place], [place] [address]Th  notified?:  I sent an electronic request to Endo Scheduling and received a confirmation today. Medication Orders:  none  Misc Orders:  none     Further instructions given by staff:  I discussed the prep instructions with the patient which she verbally understood and is aware that I will send the instructions today. via [employer]

## 2023-07-13 ENCOUNTER — PATIENT MESSAGE (OUTPATIENT)
Facility: CLINIC | Age: 53
End: 2023-07-13

## 2023-07-13 NOTE — TELEPHONE ENCOUNTER
Patient is calling states that Kelly Coffey is not letting her create a message to [facility] department. Please create a message so the patient is able to respond.

## 2023-07-13 NOTE — TELEPHONE ENCOUNTER
I spoke to [facility] and let her know the letter came over very dark when I tried to print it    She will fax the letter tomorrow.  I provided her with our fax number

## 2023-07-13 NOTE — TELEPHONE ENCOUNTER
From: Denia TITUS  To:  Cole Hennessy  Sent: 7/13/2023 4:07 PM CDT  Subject: Letter for dentist    Hi Jeannie Stains,    Hopefully after I send this to you, you will be able to send me a MyChart message!!    Lesley Lester RN  GI Clinical Staff

## 2023-07-13 NOTE — TELEPHONE ENCOUNTER
Pt is going to place a letter on MyChart from her dentist    Pt needs medical clearance from GI because she had a stent removed on 06/27/2023 before the dentist will remove 2 teeth    We will fax the form back to [facility] when signed

## 2023-07-18 ENCOUNTER — TELEPHONE (OUTPATIENT)
Facility: CLINIC | Age: 53
End: 2023-07-18

## 2023-07-18 NOTE — TELEPHONE ENCOUNTER
Patient calling to verify if fax was received for the letter from Dentist, please call.  See pt message from 7/13/23

## 2023-07-20 NOTE — TELEPHONE ENCOUNTER
July 19, 2023  Me     FRANCISCO    7/19/23  5:55 PM  Note  Letter signed and faxed to [facility] [phone]     Fax confirmation received

## 2023-07-23 ENCOUNTER — HOSPITAL ENCOUNTER (EMERGENCY)
Facility: HOSPITAL | Age: 53
Discharge: HOME | End: 2023-07-23
Attending: EMERGENCY MEDICINE
Payer: COMMERCIAL

## 2023-07-23 VITALS
BODY MASS INDEX: 26.46 KG/M2 | WEIGHT: 155 LBS | SYSTOLIC BLOOD PRESSURE: 151 MMHG | OXYGEN SATURATION: 99 % | TEMPERATURE: 98 F | DIASTOLIC BLOOD PRESSURE: 97 MMHG | RESPIRATION RATE: 18 BRPM | HEIGHT: 64 IN | HEART RATE: 85 BPM

## 2023-07-23 DIAGNOSIS — H11.32 SUBCONJUNCTIVAL HEMORRHAGE OF LEFT EYE: Primary | ICD-10-CM

## 2023-07-23 PROCEDURE — 99282 EMERGENCY DEPT VISIT SF MDM: CPT

## 2023-07-23 PROCEDURE — 99284 EMERGENCY DEPT VISIT MOD MDM: CPT

## 2023-07-23 NOTE — ED INITIAL ASSESSMENT (HPI)
Pt to ED for left eye swelling and bleeding since this am. Pt states she noticed her under eye getting \"puffy\" last night and put retinol serum under. Woke up this am and noticed severe swelling/discoloration. States she is able to see out of both eyes.

## 2023-08-08 ENCOUNTER — TELEPHONE (OUTPATIENT)
Facility: CLINIC | Age: 53
End: 2023-08-08

## 2023-08-08 NOTE — TELEPHONE ENCOUNTER
1st,overdue reminder letter mailed out to patient   Labs  order :             CBC, PLATELET; NO DIFFERENTIAL  COMP METABOLIC PANEL (14)  FERRITIN  IRON AND TIBC

## 2023-12-12 ENCOUNTER — LAB ENCOUNTER (OUTPATIENT)
Dept: LAB | Facility: HOSPITAL | Age: 53
End: 2023-12-12
Attending: INTERNAL MEDICINE
Payer: COMMERCIAL

## 2023-12-12 ENCOUNTER — HOSPITAL ENCOUNTER (OUTPATIENT)
Facility: HOSPITAL | Age: 53
Setting detail: HOSPITAL OUTPATIENT SURGERY
Discharge: HOME | End: 2023-12-12
Attending: INTERNAL MEDICINE | Admitting: INTERNAL MEDICINE
Payer: COMMERCIAL

## 2023-12-12 ENCOUNTER — TELEPHONE (OUTPATIENT)
Dept: GASTROENTEROLOGY | Facility: CLINIC | Age: 53
End: 2023-12-12

## 2023-12-12 ENCOUNTER — ANESTHESIA (OUTPATIENT)
Dept: ENDOSCOPY | Facility: HOSPITAL | Age: 53
End: 2023-12-12
Payer: COMMERCIAL

## 2023-12-12 ENCOUNTER — ANESTHESIA EVENT (OUTPATIENT)
Dept: ENDOSCOPY | Facility: HOSPITAL | Age: 53
End: 2023-12-12
Payer: COMMERCIAL

## 2023-12-12 VITALS
TEMPERATURE: 97 F | SYSTOLIC BLOOD PRESSURE: 150 MMHG | RESPIRATION RATE: 19 BRPM | BODY MASS INDEX: 25.61 KG/M2 | HEIGHT: 64 IN | HEART RATE: 82 BPM | DIASTOLIC BLOOD PRESSURE: 90 MMHG | WEIGHT: 150 LBS | OXYGEN SATURATION: 100 %

## 2023-12-12 DIAGNOSIS — R79.89 ABNORMAL LFTS (LIVER FUNCTION TESTS): Primary | ICD-10-CM

## 2023-12-12 DIAGNOSIS — K83.1 OBSTRUCTIVE JAUNDICE: ICD-10-CM

## 2023-12-12 DIAGNOSIS — K83.9 BILE DUCT ABNORMALITY: ICD-10-CM

## 2023-12-12 DIAGNOSIS — Z12.11 SCREEN FOR COLON CANCER: ICD-10-CM

## 2023-12-12 DIAGNOSIS — R79.89 ABNORMAL LFTS (LIVER FUNCTION TESTS): ICD-10-CM

## 2023-12-12 DIAGNOSIS — R17 CHOLESTATIC JAUNDICE: ICD-10-CM

## 2023-12-12 DIAGNOSIS — R74.8 ALKALINE PHOSPHATASE ELEVATION: ICD-10-CM

## 2023-12-12 LAB
ALBUMIN SERPL-MCNC: 3 G/DL (ref 3.2–4.8)
ALBUMIN/GLOB SERPL: 0.5 {RATIO} (ref 1–2)
ALP LIVER SERPL-CCNC: 627 U/L
ALT SERPL-CCNC: 84 U/L
ANION GAP SERPL CALC-SCNC: 6 MMOL/L (ref 0–18)
AST SERPL-CCNC: 180 U/L (ref ?–34)
BILIRUB SERPL-MCNC: 18.7 MG/DL (ref 0.3–1.2)
BUN BLD-MCNC: 7 MG/DL (ref 9–23)
BUN/CREAT SERPL: 11.9 (ref 10–20)
CALCIUM BLD-MCNC: 8.5 MG/DL (ref 8.7–10.4)
CHLORIDE SERPL-SCNC: 105 MMOL/L (ref 98–112)
CO2 SERPL-SCNC: 26 MMOL/L (ref 21–32)
CREAT BLD-MCNC: 0.59 MG/DL
DEPRECATED HBV CORE AB SER IA-ACNC: 293.2 NG/ML
DEPRECATED RDW RBC AUTO: 59.1 FL (ref 35.1–46.3)
EGFRCR SERPLBLD CKD-EPI 2021: 106 ML/MIN/1.73M2 (ref 60–?)
ERYTHROCYTE [DISTWIDTH] IN BLOOD BY AUTOMATED COUNT: 18.2 % (ref 11–15)
FASTING STATUS PATIENT QL REPORTED: YES
GLOBULIN PLAS-MCNC: 5.5 G/DL (ref 2.8–4.4)
GLUCOSE BLD-MCNC: 80 MG/DL (ref 70–99)
HCT VFR BLD AUTO: 31.3 %
HGB BLD-MCNC: 10.3 G/DL
IRON SATN MFR SERPL: 21 %
IRON SERPL-MCNC: 46 UG/DL
MCH RBC QN AUTO: 28.9 PG (ref 26–34)
MCHC RBC AUTO-ENTMCNC: 32.9 G/DL (ref 31–37)
MCV RBC AUTO: 87.9 FL
OSMOLALITY SERPL CALC.SUM OF ELEC: 281 MOSM/KG (ref 275–295)
PLATELET # BLD AUTO: 303 10(3)UL (ref 150–450)
POTASSIUM SERPL-SCNC: 3.6 MMOL/L (ref 3.5–5.1)
PROT SERPL-MCNC: 8.5 G/DL (ref 5.7–8.2)
RBC # BLD AUTO: 3.56 X10(6)UL
SODIUM SERPL-SCNC: 137 MMOL/L (ref 136–145)
TIBC SERPL-MCNC: 218 UG/DL (ref 250–425)
TRANSFERRIN SERPL-MCNC: 146 MG/DL (ref 250–380)
WBC # BLD AUTO: 7.5 X10(3) UL (ref 4–11)

## 2023-12-12 PROCEDURE — 80053 COMPREHEN METABOLIC PANEL: CPT

## 2023-12-12 PROCEDURE — 84466 ASSAY OF TRANSFERRIN: CPT

## 2023-12-12 PROCEDURE — 83516 IMMUNOASSAY NONANTIBODY: CPT

## 2023-12-12 PROCEDURE — 83540 ASSAY OF IRON: CPT

## 2023-12-12 PROCEDURE — 36415 COLL VENOUS BLD VENIPUNCTURE: CPT

## 2023-12-12 PROCEDURE — 85027 COMPLETE CBC AUTOMATED: CPT

## 2023-12-12 PROCEDURE — 82728 ASSAY OF FERRITIN: CPT

## 2023-12-12 PROCEDURE — 45378 DIAGNOSTIC COLONOSCOPY: CPT | Performed by: INTERNAL MEDICINE

## 2023-12-12 PROCEDURE — 0DJD8ZZ INSPECTION OF LOWER INTESTINAL TRACT, VIA NATURAL OR ARTIFICIAL OPENING ENDOSCOPIC: ICD-10-PCS | Performed by: INTERNAL MEDICINE

## 2023-12-12 RX ORDER — LIDOCAINE HYDROCHLORIDE 10 MG/ML
INJECTION, SOLUTION EPIDURAL; INFILTRATION; INTRACAUDAL; PERINEURAL AS NEEDED
Status: DISCONTINUED | OUTPATIENT
Start: 2023-12-12 | End: 2023-12-12 | Stop reason: SURG

## 2023-12-12 RX ORDER — SODIUM CHLORIDE, SODIUM LACTATE, POTASSIUM CHLORIDE, CALCIUM CHLORIDE 600; 310; 30; 20 MG/100ML; MG/100ML; MG/100ML; MG/100ML
INJECTION, SOLUTION INTRAVENOUS CONTINUOUS
OUTPATIENT
Start: 2023-12-12

## 2023-12-12 RX ORDER — SODIUM CHLORIDE, SODIUM LACTATE, POTASSIUM CHLORIDE, CALCIUM CHLORIDE 600; 310; 30; 20 MG/100ML; MG/100ML; MG/100ML; MG/100ML
INJECTION, SOLUTION INTRAVENOUS CONTINUOUS
Status: DISCONTINUED | OUTPATIENT
Start: 2023-12-12 | End: 2023-12-12

## 2023-12-12 RX ORDER — NALOXONE HYDROCHLORIDE 0.4 MG/ML
0.08 INJECTION, SOLUTION INTRAMUSCULAR; INTRAVENOUS; SUBCUTANEOUS ONCE AS NEEDED
OUTPATIENT
Start: 2023-12-12 | End: 2023-12-12

## 2023-12-12 RX ADMIN — SODIUM CHLORIDE, SODIUM LACTATE, POTASSIUM CHLORIDE, CALCIUM CHLORIDE: 600; 310; 30; 20 INJECTION, SOLUTION INTRAVENOUS at 13:13:00

## 2023-12-12 RX ADMIN — LIDOCAINE HYDROCHLORIDE 25 MG: 10 INJECTION, SOLUTION EPIDURAL; INFILTRATION; INTRACAUDAL; PERINEURAL at 13:15:00

## 2023-12-12 RX ADMIN — SODIUM CHLORIDE, SODIUM LACTATE, POTASSIUM CHLORIDE, CALCIUM CHLORIDE: 600; 310; 30; 20 INJECTION, SOLUTION INTRAVENOUS at 13:34:00

## 2023-12-12 NOTE — TELEPHONE ENCOUNTER
JULISSA Rios...    Ms. Nunn was lost to follow-up after the initial ERCP and pancreatic/biliary stent placements 11/28/2022 and then later on when she returned with occluded stent, biliary sludge and debris in the biliary tree on repeat ERCP procedure 6/27/2023.  Labs that day when she was in with likely occluded biliary tree showed total bilirubin 13.2, serum alkaline phosphatase 752.  I ordered labs on 7/7/2023 to follow-up the jaundice which were never completed.    Ms. Nunn did return today for the recommended elective colonoscopy examination.  She is again frankly jaundiced with icteric sclera.  She is aware of that.  Unclear if the jaundice ever went away.    On focused questioning, Ms. Nunn denies fevers/weakness but no abdominal pain.  She describes significant recent pruritus.    No regular alcohol consumption.    I recommended reevaluation of the ongoing jaundice with laboratory testing, question of another biliary obstruction versus chronic cholestatic liver disease.  Ms. Nunn likely will need another ERCP procedure.    Labs ordered today.

## 2023-12-12 NOTE — H&P
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 Cholecystectomy was performed 12/2/2022. No medications prior to admission. Current Facility-Administered Medications   Medication Dose Route Frequency    lactated ringers infusion   Intravenous Continuous       Allergies: No Known Allergies    Past Medical History:   Diagnosis Date    Essential hypertension     not taking medications    High blood pressure     High cholesterol     PONV (postoperative nausea and vomiting)      Past Surgical History:   Procedure Laterality Date    CHOLECYSTECTOMY      TONSILLECTOMY       Family History   Problem Relation Age of Onset    Lipids Mother     Hypertension Mother     Diabetes Mother     Cancer Mother      Social History     Tobacco Use    Smoking status: Never    Smokeless tobacco: Never   Substance Use Topics    Alcohol use: Not Currently     Comment: Every 1 now and then       SYSTEM Check if Review is Normal Check if Physical Exam is Normal If not normal, please explain:   HEENT [ ] [employer] ]    Laverna Dage [ ] [ ]    HEART [ X ] [ X ]    LUNGS [ X ] [ X ]    Nikole Like [ X ] [ X ]    Lyndal Po [ ] [ ]    Littie Tessa [ ] [ ]    OTHER        See Anesthesia documentation    [ x ] I have discussed the risks and benefits and alternatives with the patient/family. They understand and agree to proceed with plan of care. [ x ] I have reviewed the History and Physical done within the last 30 days. Any changes noted above.     Robina Dunlap MD  12/12/2023  12:53 PM

## 2023-12-12 NOTE — OPERATIVE REPORT
Mit[address]    COLONOSCOPY PROCEDURE REPORT     DATE OF PROCEDURE:  12/12/2023     PCP: Santo Perez. Doris Collier MD     PREOPERATIVE DIAGNOSIS: Screening colonoscopy examination     POSTOPERATIVE DIAGNOSIS:  See impression. SURGEON:  CANDIE Worley anesthesia provided by the Anesthesia Service. MAC anesthesia requested due to anticipated intolerance of colonoscopy examination under safe doses conscious sedation medications    COLONOSCOPY PROCEDURE:   After the nature and risks of colonoscopy examination under MAC anesthesia were discussed with the patient and all questions answered, informed consent was obtained. The patient was sedated as above. Digital rectal exam was performed which showed no masses. The Olympus pediatric video colonoscope was placed in the patient's rectum and advanced under direct visualization through the entire length of the colon up to the cecum and terminal ileum. Retroflex exam performed up the ascending colon to the hepatic flexure. The cecum was confirmed by landmarks including appendiceal orifice, cecal trifold, ileocecal valve. Retroflexion was performed in the rectum. The quality of the prep was excellent. 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 Small internal hemorrhoids. RECOMMENDATIONS:  High fiber diet. Laboratory testing ordered today to evaluate ongoing jaundice and pruritus. Question of persistent biliary obstruction versus chronic cholestatic liver disease. Repeat colonoscopy examination in 10 years.

## 2023-12-12 NOTE — DISCHARGE INSTRUCTIONS
Home Care Instructions for Colonoscopy with Sedation    Diet:  - Resume your regular diet as tolerated unless otherwise instructed. - Start with light meals to minimize bloating.  - Do not drink alcohol today. Medication:  - If you have questions about resuming your normal medications, please contact your Primary Care Physician. Activities:  - Take it easy today. Do not return to work today. - Do not drive today. - Do not operate any machinery today (including kitchen equipment). Colonoscopy:  - You may notice some rectal \"spotting\" (a little blood on the toilet tissue) for a day or two after the exam. This is normal.  - If you experience any rectal bleeding (not spotting), persistent tenderness or sharp severe abdominal pains, oral temperature over 100 degrees Fahrenheit, light-headedness or dizziness, or any other problems, contact your doctor. **If unable to reach your doctor, please go to the [facility] Emergency Room**    - Your referring physician will receive a full report of your examination.  - If you do not hear from your doctor's office within two weeks of your biopsy, please call them for your results. You may be able to see your laboratory results in [facility] between 4 and 7 business days. In some cases, your physician may not have viewed the results before they are released to [address] If you have questions regarding your results contact the physician who ordered the test/exam by phone or via [address] by choosing \"Ask a Medical Question. \"  . .  Notes from Dr. Shea Murray:    Very small internal hemorrhoids only on today's colonoscopy exam.  Those may cause occasional blood streaks but are not a problem. NO colon polyps today. Great news. If you are raw and irritated back there after all of that diarrhea and wiping, three good options to soothe and heal the irritation include Aquaphor ointment, \"Desitin\" or \"A & D\" diaper ointment, or good old-fashioned Vaseline.   All of these soothe and create a protective barrier so that your skin can heal.  Please go for blood tests today on this campus (\"[facility]) otherwise in any of the [address] [place] [address] or [place]. Repeat colonoscopy exam or other colon cancer screening in 8-10 years, unless siblings or parents found to have colon polyps or colon cancer. .  .  .

## 2023-12-14 LAB
ACTIN SMOOTH MUSCLE AB: 87 UNITS
M2 MITOCHONDRIAL AB: 184.4 UNITS

## 2023-12-14 NOTE — TELEPHONE ENCOUNTER
GI RNs-    See telephone note below.  Severe abnormalities on labs including total bilirubin > 18.    Please call Ms. Nunn and assist her in scheduling the MRI with [facility] and the liver biopsy

## 2023-12-14 NOTE — TELEPHONE ENCOUNTER
Thanks all.  Ms. Nunn seemed to be in a bit of denial with the ongoing jaundice but I think I had her worried after speaking to her on the day of her colonoscopy this week.  She came in immediately for the labs that I ordered.    Labs of 12/12/2023 show substantial worsening of the previous jaundice.  Total bilirubin is up to 18.7.    CBC shows a mild nonspecific anemia, normal white blood count.    Mitochondrial antibody is positive which goes with clinical suspicion for possible primary biliary cirrhosis.    Smooth muscle antibody is also moderately positive of uncertain significance.    Low/normal iron studies.  Reassuring colonoscopy examination this week; TWO ERCPs through the stomach 2022 and June 2023.    Ms. Nunn is extremely scared that this jaundice could be cancer.  I explained that I doubt this is cancer, much more suspicious of a primary biliary or liver process such as PBC or autoimmune hepatitis.    I advised another image MRI MRCP again rule out obstructing process, liver biopsy.    Ms. Nunn agrees and believes that she can do both.      [facility] [phone]

## 2023-12-15 RX ORDER — MELATONIN
325
COMMUNITY

## 2023-12-15 NOTE — TELEPHONE ENCOUNTER
I faxed the order for the liver bx to IR @ [phone]    I also left a message for Mami Hwang to call the pt    I called Cammie and gave her the phone number to [employer] [facility] and the phone number to IR if she doesn't here from Mami    [employer] [facility] [phone].

## 2023-12-23 ENCOUNTER — LAB ENCOUNTER (OUTPATIENT)
Dept: LAB | Facility: HOSPITAL | Age: 53
End: 2023-12-23
Attending: RADIOLOGY
Payer: COMMERCIAL

## 2023-12-23 DIAGNOSIS — Z01.818 PRE-OP TESTING: ICD-10-CM

## 2023-12-23 LAB
INR BLD: 1.22 (ref 0.8–1.2)
PROTHROMBIN TIME: 16.1 SECONDS (ref 11.6–14.8)

## 2023-12-23 PROCEDURE — 85610 PROTHROMBIN TIME: CPT

## 2023-12-23 PROCEDURE — 36415 COLL VENOUS BLD VENIPUNCTURE: CPT

## 2023-12-26 ENCOUNTER — HOSPITAL ENCOUNTER (OUTPATIENT)
Dept: INTERVENTIONAL RADIOLOGY/VASCULAR | Facility: HOSPITAL | Age: 53
Discharge: HOME | End: 2023-12-26
Attending: INTERNAL MEDICINE | Admitting: RADIOLOGY
Payer: COMMERCIAL

## 2023-12-26 VITALS
BODY MASS INDEX: 25.61 KG/M2 | RESPIRATION RATE: 18 BRPM | HEIGHT: 64 IN | DIASTOLIC BLOOD PRESSURE: 77 MMHG | OXYGEN SATURATION: 99 % | SYSTOLIC BLOOD PRESSURE: 128 MMHG | TEMPERATURE: 97 F | WEIGHT: 150 LBS | HEART RATE: 79 BPM

## 2023-12-26 DIAGNOSIS — Z01.818 PRE-OP TESTING: Primary | ICD-10-CM

## 2023-12-26 DIAGNOSIS — R17 CHOLESTATIC JAUNDICE: ICD-10-CM

## 2023-12-26 DIAGNOSIS — R79.89 ABNORMAL LFTS (LIVER FUNCTION TESTS): ICD-10-CM

## 2023-12-26 DIAGNOSIS — R74.8 ALKALINE PHOSPHATASE ELEVATION: ICD-10-CM

## 2023-12-26 PROCEDURE — 76942 ECHO GUIDE FOR BIOPSY: CPT | Performed by: RADIOLOGY

## 2023-12-26 PROCEDURE — 88307 TISSUE EXAM BY PATHOLOGIST: CPT | Performed by: INTERNAL MEDICINE

## 2023-12-26 PROCEDURE — 36415 COLL VENOUS BLD VENIPUNCTURE: CPT

## 2023-12-26 PROCEDURE — 47000 NEEDLE BIOPSY OF LIVER PERQ: CPT | Performed by: RADIOLOGY

## 2023-12-26 PROCEDURE — 0FB23ZX EXCISION OF LEFT LOBE LIVER, PERCUTANEOUS APPROACH, DIAGNOSTIC: ICD-10-PCS | Performed by: RADIOLOGY

## 2023-12-26 PROCEDURE — 99152 MOD SED SAME PHYS/QHP 5/>YRS: CPT | Performed by: RADIOLOGY

## 2023-12-26 PROCEDURE — 88313 SPECIAL STAINS GROUP 2: CPT | Performed by: INTERNAL MEDICINE

## 2023-12-26 RX ORDER — LIDOCAINE HYDROCHLORIDE 20 MG/ML
INJECTION, SOLUTION EPIDURAL; INFILTRATION; INTRACAUDAL; PERINEURAL
Status: COMPLETED
Start: 2023-12-26 | End: 2023-12-26

## 2023-12-26 RX ORDER — MIDAZOLAM HYDROCHLORIDE 1 MG/ML
INJECTION INTRAMUSCULAR; INTRAVENOUS
Status: COMPLETED
Start: 2023-12-26 | End: 2023-12-26

## 2023-12-26 RX ORDER — SODIUM CHLORIDE 9 MG/ML
INJECTION, SOLUTION INTRAVENOUS CONTINUOUS
Status: DISCONTINUED | OUTPATIENT
Start: 2023-12-26 | End: 2023-12-26

## 2023-12-26 NOTE — PRE-SEDATION ASSESSMENT
[facility]  IR Pre-Procedure Sedation Assessment    History of snoring or sleep or apnea? No    History of previous problems with anesthesia or sedation  No    Physical Findings:  Neck: nl ROM  CV: regular rate  PULM: normal respiratory rate/effort    Mallampati Score:  II (hard and soft palate, upper portion of tonsils anduvula visible)    ASA Classification:   2.  Patient with mild systemic disease    Plan:   -IV moderate sedation    Rey Brewster MD

## 2023-12-26 NOTE — DISCHARGE INSTRUCTIONS
Pr-14  4.2  [phone]     Patient Name:  Ash Granado    Procedure:  IR LIVER BIOPSY    Site Care: Remove your band-aid or dressing in 24 hours. Gently wash area with soap and water. Activity/Diet  No heavy lifting or strenuous activity for 48 hours. Drink plenty of fluids, unless you have otherwise been told to restrict your fluid intake. Do not drink alcohol for 24 hours. Do not drive,  operate heavy machinery, make important decisions or sign legal documents today. Medications: Take acetaminophen if needed for pain. Do not exceed 4000mg of acetaminophen in a 24-hour period. and Make no changes to your existing medications. Contact Interventional Radiology at [phone] if you have severe/unrelieved pain, fever, chills, dizziness/lightheadedness, or drainage/bleeding from your incision site.

## 2023-12-26 NOTE — INTERVAL H&P NOTE
The above referenced H&P was reviewed by Rey Brewster MD on 12/26/2023, the patient was examined and no significant changes have occurred in the patient's condition since the H&P was performed. Risks, benefits, alternative treatments and consequences of no treatment were discussed. We will proceed with procedure as planned.       Rey Brewster MD  12/26/2023  8:06 AM

## 2023-12-26 NOTE — IVS NOTE
DISCHARGE NOTE     Pt is able to sit up and ambulate without difficulty. Pt voided and tolerated fluids and food. Procedural site remains dry and intact. No signs and symptoms of bleeding/hematoma noted. IV access removed. Instruction provided, patient/family verbalizes understanding. Dr. Brandy Dwyer spoke with patient/family post procedure. Pt discharge via wheelchair to [facility].     Follow up Appointment: none    New Prescription: none

## 2023-12-26 NOTE — PROCEDURES
[facility]  Procedure Note    Kate Turner Degraffenreid Patient Status:  Outpatient    1970 MRN [id number]   [facility] Day # 0 PCP Emili Al MD     Procedure: Core liver parenchymal biopsy    Pre-Procedure Diagnosis: Abnormal LFTs (liver function tests) [R79.89]  Alkaline phosphatase elevation [R74.8]  Cholestatic jaundice [R17]      Post-Procedure Diagnosis: Abnormal LFTs (liver function tests) [R79.89]  Alkaline phosphatase elevation [R74.8]  Cholestatic jaundice [R17]    Anesthesia:  Local and Sedation    Findings:  Under ultrasound guidance, two 18G core biopsy samples were obtained from the left lobe of the liver. Specimens: Two 18G core liver to pathology    Blood Loss:  2mL      Complications:  None    Plan:  Observe for 2 hours.     Faustina Shen MD  2023

## 2024-01-08 ENCOUNTER — TELEPHONE (OUTPATIENT)
Dept: GASTROENTEROLOGY | Facility: CLINIC | Age: 54
End: 2024-01-08

## 2024-01-08 DIAGNOSIS — R74.8 ALKALINE PHOSPHATASE ELEVATION: ICD-10-CM

## 2024-01-08 DIAGNOSIS — R79.89 ABNORMAL LFTS (LIVER FUNCTION TESTS): ICD-10-CM

## 2024-01-08 DIAGNOSIS — R74.8 ABNORMAL TRANSAMINASES: ICD-10-CM

## 2024-01-08 DIAGNOSIS — R17 JAUNDICE: Primary | ICD-10-CM

## 2024-01-08 RX ORDER — PREDNISONE 10 MG/1
40 TABLET ORAL DAILY
Qty: 120 TABLET | Refills: 1 | Status: SHIPPED | OUTPATIENT
Start: 2024-01-08 | End: 2024-03-12

## 2024-01-08 NOTE — TELEPHONE ENCOUNTER
Liver biopsy results discussed by telephone with pathologist Liz Trujillo MD on 12/29/2023.  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    However, possible overlap syndrome component of autoimmune hepatitis with findings of interface hepatitis, plasma cells, lobular inflammation described.    I called Ms. Nunn at [phone] (Mobile) to discuss these results.  PBC obviously carries a fairly severe prognosis, but would at least make an initial trial of steroids to see what impact they would have.    No answer on initial phone call, left voicemail.    I would like to make a trial of prednisone 40 mg daily for 2-4 weeks while monitoring LFTs.

## 2024-01-09 NOTE — TELEPHONE ENCOUNTER
I called back today and Loyda answered.    She is aware of the need to schedule the MRI scan.  Not yet completed.    She has been overwhemled caring for a son with severe depression and anxiety - sounds like he is at [facility].  Sounds like there may be a new psychiatric diagnosis.    No new complaints.    We reviewed the percutaneous liver biopsy results and my concern for chronic liver disease.    I recommended starting a trial of prednisone and getting that MRI scan completed.    Return for another CMP in 2 weeks to assess response to prednisone.    We discussed evaluation at a tertiary center.  After MRI is resulted, we will see if we can get her seen in the [facility] system.    - cb

## 2024-01-09 NOTE — TELEPHONE ENCOUNTER
I called again and call again goes to a generic voicemail.    Prescription for the prednisone sent into [facility].  We will keep trying.

## 2024-01-29 ENCOUNTER — TELEPHONE (OUTPATIENT)
Facility: CLINIC | Age: 54
End: 2024-01-29

## 2024-01-29 NOTE — TELEPHONE ENCOUNTER
1st,overdue reminder letter mailed out to patient    Imaging order :     US BIOPSY LIVER (CPT=76942/05216)  z [facility] ABDOMEN (W+WO) (CPT=74183)

## 2024-03-05 DIAGNOSIS — K83.1 OBSTRUCTIVE JAUNDICE (HCC): ICD-10-CM

## 2024-03-05 DIAGNOSIS — R74.8 ABNORMAL TRANSAMINASES: ICD-10-CM

## 2024-03-05 DIAGNOSIS — R79.89 ABNORMAL LFTS (LIVER FUNCTION TESTS): ICD-10-CM

## 2024-03-05 DIAGNOSIS — R74.8 ALKALINE PHOSPHATASE ELEVATION: Primary | ICD-10-CM

## 2024-03-05 DIAGNOSIS — R17 CHOLESTATIC JAUNDICE: ICD-10-CM

## 2024-03-07 NOTE — TELEPHONE ENCOUNTER
Requested Prescriptions     Pending Prescriptions Disp Refills    PREDNISONE 10 MG Oral Tab [Pharmacy Med Name: predniSONE 10 MG Oral Tablet] 120 tablet 0     Sig: TAKE 4 TABLETS BY MOUTH ONCE DAILY      Lr: 1/8/2024            Qty:120 tablet         refills:1  Lov: no office visit found ,except H&P  from 12/12/2023  Had Colon done :12/12/2023

## 2024-03-12 ENCOUNTER — TELEPHONE (OUTPATIENT)
Facility: CLINIC | Age: 54
End: 2024-03-12

## 2024-03-12 RX ORDER — PREDNISONE 10 MG/1
40 TABLET ORAL DAILY
Qty: 120 TABLET | Refills: 1 | Status: SHIPPED | OUTPATIENT
Start: 2024-03-12

## 2024-03-12 NOTE — TELEPHONE ENCOUNTER
I called Ms. Nunn to check in.  She had not returned for labs since the Prednisone Rx 1/08/2024.    She states that she is feeling better.  She states that her eyes are less yellow and the swelling in her stomach has resolved.    She took the prednisone full dose with refill until it ran out last week.    I advised that we should get some follow-up labs and see if her LFTs are improving.    She works week but she can come in this Saturday.    I advised continuing the prednisone until we have another set of labs.    I placed a standing order for CMP so there will not be any confusion about waiting for calls or orders to go for blood test.  She could come in as often as weekly depending on how things go.    - cb

## 2024-04-25 ENCOUNTER — TELEPHONE (OUTPATIENT)
Facility: CLINIC | Age: 54
End: 2024-04-25

## 2024-04-25 NOTE — TELEPHONE ENCOUNTER
1st,overdue reminder letter mailed out to patient   Labs order :  Orders Placed on 3/12/24    Comp Metabolic Panel (14) weekly  Prothrombin Time (PT/INR)

## 2024-05-18 ENCOUNTER — TELEPHONE (OUTPATIENT)
Facility: CLINIC | Age: 54
End: 2024-05-18

## 2024-05-18 DIAGNOSIS — R79.89 ABNORMAL LFTS (LIVER FUNCTION TESTS): ICD-10-CM

## 2024-05-18 DIAGNOSIS — R74.8 ABNORMAL TRANSAMINASES: ICD-10-CM

## 2024-05-18 DIAGNOSIS — R74.8 ALKALINE PHOSPHATASE ELEVATION: Primary | ICD-10-CM

## 2024-05-18 DIAGNOSIS — Z51.81 ENCOUNTER FOR MONITORING AZATHIOPRINE THERAPY: ICD-10-CM

## 2024-05-18 DIAGNOSIS — R17 CHOLESTATIC JAUNDICE: ICD-10-CM

## 2024-05-18 DIAGNOSIS — Z79.624 ENCOUNTER FOR MONITORING AZATHIOPRINE THERAPY: ICD-10-CM

## 2024-05-18 NOTE — TELEPHONE ENCOUNTER
Gabriel, could you please call Ms. Nunn to follow-up?  She has been pretty jaundiced with liver disease, not entirely clear whether a biliary disease called PBC or something treatable called \"autoimmune hepatitis\".  I have had her on prednisone here and there since December but she has not come back for labs since last labs in December before a trial of prednisone.    She has a standing order for CMP.  Could you please ask her to come in and get that drawn this week or next?    How is she doing?    - cb

## 2024-05-20 ENCOUNTER — LAB ENCOUNTER (OUTPATIENT)
Dept: LAB | Facility: HOSPITAL | Age: 54
End: 2024-05-20
Attending: INTERNAL MEDICINE

## 2024-05-20 DIAGNOSIS — R79.89 ABNORMAL LFTS (LIVER FUNCTION TESTS): ICD-10-CM

## 2024-05-20 DIAGNOSIS — R74.8 ALKALINE PHOSPHATASE ELEVATION: ICD-10-CM

## 2024-05-20 DIAGNOSIS — R74.8 ABNORMAL TRANSAMINASES: ICD-10-CM

## 2024-05-20 DIAGNOSIS — R17 CHOLESTATIC JAUNDICE: ICD-10-CM

## 2024-05-20 LAB
ALBUMIN SERPL-MCNC: 3.3 G/DL (ref 3.2–4.8)
ALBUMIN/GLOB SERPL: 0.9 {RATIO} (ref 1–2)
ALP LIVER SERPL-CCNC: 515 U/L
ALT SERPL-CCNC: 109 U/L
ANION GAP SERPL CALC-SCNC: 8 MMOL/L (ref 0–18)
AST SERPL-CCNC: 131 U/L (ref ?–34)
BILIRUB SERPL-MCNC: 12.3 MG/DL (ref 0.3–1.2)
BUN BLD-MCNC: 12 MG/DL (ref 9–23)
BUN/CREAT SERPL: 17.9 (ref 10–20)
CALCIUM BLD-MCNC: 8.3 MG/DL (ref 8.7–10.4)
CHLORIDE SERPL-SCNC: 108 MMOL/L (ref 98–112)
CO2 SERPL-SCNC: 24 MMOL/L (ref 21–32)
CREAT BLD-MCNC: 0.67 MG/DL
DEPRECATED HBV CORE AB SER IA-ACNC: 228.1 NG/ML
DEPRECATED RDW RBC AUTO: 59.4 FL (ref 35.1–46.3)
EGFRCR SERPLBLD CKD-EPI 2021: 103 ML/MIN/1.73M2 (ref 60–?)
ERYTHROCYTE [DISTWIDTH] IN BLOOD BY AUTOMATED COUNT: 17.5 % (ref 11–15)
FASTING STATUS PATIENT QL REPORTED: NO
GLOBULIN PLAS-MCNC: 3.6 G/DL (ref 2–3.5)
GLUCOSE BLD-MCNC: 132 MG/DL (ref 70–99)
HCT VFR BLD AUTO: 33.9 %
HGB BLD-MCNC: 11 G/DL
INR BLD: 1.14 (ref 0.8–1.2)
IRON SATN MFR SERPL: 43 %
IRON SERPL-MCNC: 85 UG/DL
MCH RBC QN AUTO: 30 PG (ref 26–34)
MCHC RBC AUTO-ENTMCNC: 32.4 G/DL (ref 31–37)
MCV RBC AUTO: 92.4 FL
OSMOLALITY SERPL CALC.SUM OF ELEC: 292 MOSM/KG (ref 275–295)
PLATELET # BLD AUTO: 159 10(3)UL (ref 150–450)
POTASSIUM SERPL-SCNC: 3.6 MMOL/L (ref 3.5–5.1)
PROT SERPL-MCNC: 6.9 G/DL (ref 5.7–8.2)
PROTHROMBIN TIME: 15.3 SECONDS (ref 11.6–14.8)
RBC # BLD AUTO: 3.67 X10(6)UL
SODIUM SERPL-SCNC: 140 MMOL/L (ref 136–145)
TIBC SERPL-MCNC: 198 UG/DL (ref 250–425)
TRANSFERRIN SERPL-MCNC: 133 MG/DL (ref 250–380)
WBC # BLD AUTO: 7.5 X10(3) UL (ref 4–11)

## 2024-05-20 PROCEDURE — 80053 COMPREHEN METABOLIC PANEL: CPT

## 2024-05-20 PROCEDURE — 83540 ASSAY OF IRON: CPT

## 2024-05-20 PROCEDURE — 85610 PROTHROMBIN TIME: CPT

## 2024-05-20 PROCEDURE — 36415 COLL VENOUS BLD VENIPUNCTURE: CPT

## 2024-05-20 PROCEDURE — 85027 COMPLETE CBC AUTOMATED: CPT

## 2024-05-20 PROCEDURE — 84466 ASSAY OF TRANSFERRIN: CPT

## 2024-05-20 PROCEDURE — 82728 ASSAY OF FERRITIN: CPT

## 2024-05-20 NOTE — TELEPHONE ENCOUNTER
THANKS MIRANDA.    I am glad Cammie is feeling well.    Please advise her that I cannot refill the prednisone until I see some updated labs.  Please ask her to come in this week.    - cb

## 2024-05-20 NOTE — TELEPHONE ENCOUNTER
Dr Diamond    I called and spoke to Loyda    She states she is doing well    She states the bulge at the top of her stomach is much less inflamed    She states her eyes are still yellow but better    She is still taking prednisone 40 mg daily    She states she needs more prednisone sent to her pharmacy    She will go to the lab for her CMP this week    I told her I would let her know about weaning the prednisone

## 2024-05-21 RX ORDER — PREDNISONE 10 MG/1
40 TABLET ORAL DAILY
Qty: 120 TABLET | Refills: 1 | Status: SHIPPED | OUTPATIENT
Start: 2024-05-21 | End: 2024-06-20

## 2024-05-22 NOTE — TELEPHONE ENCOUNTER
Prescription sent into [facility] pharmacy.  I will call Cammie to ask if she is willing to make a trial of azathioprine next.    - cb

## 2024-05-22 NOTE — TELEPHONE ENCOUNTER
Requested Prescriptions     Pending Prescriptions Disp Refills    PREDNISONE 10 MG Oral Tab [Pharmacy Med Name: predniSONE 10 MG Oral Tablet] 120 tablet 0     Sig: TAKE 4 TABLETS BY MOUTH ONCE DAILY    Possible duplicate: Ivy to review recent actions on this medication    Lr 3/12/2024                Qty:120 tablet      refills: 1  Lov:No office visit found except  Consults notes from 11/28/2022  Last Colon was 12/26/2023  [place]

## 2024-06-01 RX ORDER — URSODIOL 300 MG/1
600 CAPSULE ORAL 2 TIMES DAILY
Qty: 360 CAPSULE | Refills: 3 | Status: SHIPPED | OUTPATIENT
Start: 2024-06-01 | End: 2024-08-30

## 2024-06-01 RX ORDER — AZATHIOPRINE 50 MG/1
100 TABLET ORAL DAILY
Qty: 180 TABLET | Refills: 3 | Status: SHIPPED | OUTPATIENT
Start: 2024-06-01 | End: 2024-08-30

## 2024-06-01 NOTE — TELEPHONE ENCOUNTER
Thomas Rios, see below.    I have been following Ms. Nunn for over 1 year and she seems to be slowly improving on prednisone for a somewhat uncertain liver disease.    Could you please call her to follow-up?    As below I want her to taper off the prednisone and start azathioprine and ursodiol, both prescriptions sent to her [facility] mail order pharmacy.    Could you please help her schedule an appointment to see Shahab Ness of [facility].  She needs to be set up with a hepatology/transplant program.    Then please recall for a phone call in 3 weeks to start badgering her to come back for CBC/CMP labs to make sure she is tolerating the azathioprine.    - cb

## 2024-06-01 NOTE — TELEPHONE ENCOUNTER
I called Cammie today.    She is feeling much better on the prednisone.  As below, pruritus from December has resolved, bowel movements are normal color, feeling better.  She does describe becoming cushingoid.    She describes new frequent ?nonbloody stools and urgency.  Taking a probiotic yoghurt.  We did complete a colonoscopy in December which was reassuring.    The previous pruritis has completely resolved.    We discussed her recent labs.  Some improvement with total bilirubin 18.7 -> 12.3; alk phosphatase 968 -> 515;  ; INR 1.14; improved anemia hemoglobin 11.0g MCV 92.4, platelet count again normal.    She is starting to develop hip pain, hopefully not from the prednisone.    Today I discussed and recommended the followin.  Taper off the prednisone, reduce dose by 10 mg/day each week stepping down to 30 mg/day tomorrow  2.  Start azathioprine 100 mg daily, prescription sent in to [facility] home [facility]  3.  Start ursodiol 1200 mg/day (17 mg/kg) if possible; prescription sent in to [facility] home delivery.  I have sent in the 300 mg capsules, sometimes the 500 mg capsules are covered instead  4.  I explained absolute importance of Hepatology consultation and follow-up.  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    I recommended consultation with Dr. Shahab Ness of [facility].  We will see if we can help her schedule the appointment.    - micah

## 2024-06-06 ENCOUNTER — TELEPHONE (OUTPATIENT)
Facility: CLINIC | Age: 54
End: 2024-06-06

## 2024-06-06 NOTE — TELEPHONE ENCOUNTER
Dr Gibbons spoke to Cammie and reviewed your below recommendations with her    She is currently on 30 mg of prednisone. On Saturday she will go down to 20 mg    She is aware that each week she should go down by 10 mg    She should receive both the ursodiol & azathioprine by this weekend from [facility].    I did place a referral for Cammie to see Dr Ness. His office does not schedule patients without a referral in the system. I  also faxed the referral to their appropriate fax number    I gave Cammie Ness's phone number. She will call me back if she can not get an appointment in a timely manner    I also placed a recall for Loyda to go for blood draw in 3 weeks (CBC, CMP). Pt is aware she needs to go for this around 06/27/2024

## 2024-06-06 NOTE — TELEPHONE ENCOUNTER
Please recall for a phone call in 3 weeks to start badgering her to come back for CBC/CMP labs to make sure she is tolerating the azathioprine.     - cb    ==============================    Message sent to pt outreach for pt to repeat CBC & CMP in 3 weeks

## 2024-06-11 NOTE — TELEPHONE ENCOUNTER
Dr Diamond,    I spoke to Cammie and she has an appt with Dr Ness on 06/25/2024 @ 4:30 pm    She started the azathioprine and ursodiol last Friday and she is at 20 mg of prednisone    She will go for her blood work just prior to her appt with Dr Ness

## 2024-06-15 RX ORDER — PREDNISONE 10 MG/1
40 TABLET ORAL DAILY
Qty: 120 TABLET | Refills: 0 | Status: SHIPPED | OUTPATIENT
Start: 2024-06-15

## 2024-06-25 ENCOUNTER — LAB ENCOUNTER (OUTPATIENT)
Dept: LAB | Facility: HOSPITAL | Age: 54
End: 2024-06-25
Attending: INTERNAL MEDICINE

## 2024-06-25 DIAGNOSIS — Z79.624 ENCOUNTER FOR MONITORING AZATHIOPRINE THERAPY: ICD-10-CM

## 2024-06-25 DIAGNOSIS — R79.89 ABNORMAL LFTS (LIVER FUNCTION TESTS): ICD-10-CM

## 2024-06-25 DIAGNOSIS — Z51.81 ENCOUNTER FOR MONITORING AZATHIOPRINE THERAPY: ICD-10-CM

## 2024-06-25 DIAGNOSIS — R74.8 ALKALINE PHOSPHATASE ELEVATION: ICD-10-CM

## 2024-06-25 LAB
ALBUMIN SERPL-MCNC: 3.5 G/DL (ref 3.2–4.8)
ALBUMIN/GLOB SERPL: 0.9 {RATIO} (ref 1–2)
ALP LIVER SERPL-CCNC: 347 U/L
ALT SERPL-CCNC: 91 U/L
ANION GAP SERPL CALC-SCNC: 5 MMOL/L (ref 0–18)
AST SERPL-CCNC: 139 U/L (ref ?–34)
BILIRUB SERPL-MCNC: 13.4 MG/DL (ref 0.3–1.2)
BUN BLD-MCNC: 17 MG/DL (ref 9–23)
BUN/CREAT SERPL: 17.3 (ref 10–20)
CALCIUM BLD-MCNC: 9.1 MG/DL (ref 8.7–10.4)
CHLORIDE SERPL-SCNC: 108 MMOL/L (ref 98–112)
CO2 SERPL-SCNC: 28 MMOL/L (ref 21–32)
CREAT BLD-MCNC: 0.98 MG/DL
DEPRECATED RDW RBC AUTO: 63.5 FL (ref 35.1–46.3)
EGFRCR SERPLBLD CKD-EPI 2021: 68 ML/MIN/1.73M2 (ref 60–?)
ERYTHROCYTE [DISTWIDTH] IN BLOOD BY AUTOMATED COUNT: 18 % (ref 11–15)
FASTING STATUS PATIENT QL REPORTED: NO
GLOBULIN PLAS-MCNC: 3.9 G/DL (ref 2–3.5)
GLUCOSE BLD-MCNC: 95 MG/DL (ref 70–99)
HCT VFR BLD AUTO: 34.1 %
HGB BLD-MCNC: 11 G/DL
MCH RBC QN AUTO: 30.9 PG (ref 26–34)
MCHC RBC AUTO-ENTMCNC: 32.3 G/DL (ref 31–37)
MCV RBC AUTO: 95.8 FL
OSMOLALITY SERPL CALC.SUM OF ELEC: 293 MOSM/KG (ref 275–295)
PLATELET # BLD AUTO: 176 10(3)UL (ref 150–450)
POTASSIUM SERPL-SCNC: 4.1 MMOL/L (ref 3.5–5.1)
PROT SERPL-MCNC: 7.4 G/DL (ref 5.7–8.2)
RBC # BLD AUTO: 3.56 X10(6)UL
SODIUM SERPL-SCNC: 141 MMOL/L (ref 136–145)
WBC # BLD AUTO: 7.6 X10(3) UL (ref 4–11)

## 2024-06-25 PROCEDURE — 85027 COMPLETE CBC AUTOMATED: CPT

## 2024-06-25 PROCEDURE — 36415 COLL VENOUS BLD VENIPUNCTURE: CPT

## 2024-06-25 PROCEDURE — 80053 COMPREHEN METABOLIC PANEL: CPT

## 2024-06-27 ENCOUNTER — APPOINTMENT (OUTPATIENT)
Dept: ULTRASOUND IMAGING | Facility: HOSPITAL | Age: 54
End: 2024-06-27

## 2024-06-27 ENCOUNTER — APPOINTMENT (OUTPATIENT)
Dept: GENERAL RADIOLOGY | Facility: HOSPITAL | Age: 54
End: 2024-06-27
Attending: EMERGENCY MEDICINE

## 2024-06-27 ENCOUNTER — HOSPITAL ENCOUNTER (EMERGENCY)
Facility: HOSPITAL | Age: 54
Discharge: HOME | End: 2024-06-27
Attending: EMERGENCY MEDICINE

## 2024-06-27 VITALS
RESPIRATION RATE: 20 BRPM | WEIGHT: 169 LBS | DIASTOLIC BLOOD PRESSURE: 80 MMHG | SYSTOLIC BLOOD PRESSURE: 149 MMHG | HEART RATE: 78 BPM | HEIGHT: 64 IN | TEMPERATURE: 98 F | OXYGEN SATURATION: 100 % | BODY MASS INDEX: 28.85 KG/M2

## 2024-06-27 DIAGNOSIS — M16.12 ARTHRITIS OF LEFT HIP: Primary | ICD-10-CM

## 2024-06-27 PROCEDURE — 73502 X-RAY EXAM HIP UNI 2-3 VIEWS: CPT | Performed by: EMERGENCY MEDICINE

## 2024-06-27 PROCEDURE — 93971 EXTREMITY STUDY: CPT | Performed by: EMERGENCY MEDICINE

## 2024-06-27 PROCEDURE — 99284 EMERGENCY DEPT VISIT MOD MDM: CPT

## 2024-06-27 PROCEDURE — 96372 THER/PROPH/DIAG INJ SC/IM: CPT

## 2024-06-27 RX ORDER — KETOROLAC TROMETHAMINE 10 MG/1
10 TABLET, FILM COATED ORAL EVERY 6 HOURS PRN
Qty: 20 TABLET | Refills: 0 | Status: SHIPPED | OUTPATIENT
Start: 2024-06-27 | End: 2024-07-02

## 2024-06-27 RX ORDER — KETOROLAC TROMETHAMINE 30 MG/ML
30 INJECTION, SOLUTION INTRAMUSCULAR; INTRAVENOUS ONCE
Status: COMPLETED | OUTPATIENT
Start: 2024-06-27 | End: 2024-06-27

## 2024-06-27 NOTE — ED INITIAL ASSESSMENT (HPI)
Pt presents for left upper leg swelling and pain for the last few weeks. Reports pain is worse, reports unable to bear weight. Pt denies recent travel or hospitalizations. Pt denies hx of blood clots.

## 2024-06-28 NOTE — ED PROVIDER NOTES
Patient Seen in: [facility] Emergency Department    History     Chief Complaint   Patient presents with    Leg Swelling       HPI    The patient presents to the ED complaining of pain and swelling in her left thigh area.  She states symptoms been going on for the last 2 weeks and is much worse with walking.  Denies other complaints or injury.    History reviewed.   Past Medical History:    Essential hypertension    not taking medications    High blood pressure    High cholesterol    PONV (postoperative nausea and vomiting)       History reviewed.   Past Surgical History:   Procedure Laterality Date    Cholecystectomy      Colonoscopy N/A 12/12/2023    Procedure: COLONOSCOPY;  Surgeon: Jeison Diamond MD;  Location: [facility] ENDOSCOPY    Tonsillectomy           Medications :  (Not in a hospital admission)       Family History   Problem Relation Age of Onset    Lipids Mother     Hypertension Mother     Diabetes Mother     Cancer Mother        Smoking Status:   Social History     Socioeconomic History    Marital status: Single   Tobacco Use    Smoking status: Never    Smokeless tobacco: Never   Vaping Use    Vaping status: Never Used   Substance and Sexual Activity    Alcohol use: Not Currently     Comment: Every 1 now and then    Drug use: Never       Constitutional and vital signs reviewed.      Social History and Family History elements reviewed from today, pertinent positives to the presenting problem noted.    Physical Exam     ED Triage Vitals [06/27/24 1759]   /82   Pulse 90   Resp 19   Temp 98.2 °F (36.8 °C)   Temp src Oral   SpO2 100 %   O2 Device None (Room air)       All measures to prevent infection transmission during my interaction with the patient were taken. Handwashing was performed prior to and after the exam.  Stethoscope and any equipment used during my examination was cleaned with super sani-cloth germicidal wipes following the exam.     Physical Exam  Vitals and nursing note  reviewed.   Constitutional:       General: She is not in acute distress.     Appearance: She is well-developed.   HENT:      Head: Normocephalic and atraumatic.   Eyes:      General:         Right eye: No discharge.         Left eye: No discharge.      Conjunctiva/sclera: Conjunctivae normal.   Neck:      Trachea: No tracheal deviation.   Cardiovascular:      Rate and Rhythm: Normal rate.   Pulmonary:      Effort: Pulmonary effort is normal. No respiratory distress.   Abdominal:      General: There is no distension.      Palpations: Abdomen is soft.   Musculoskeletal:         General: Tenderness present. No swelling or deformity.      Comments: Tenderness to the left hip area.  Severe tenderness with range of motion of the hip.  No bony deformity.  Normal strength and sensation in the distal left leg and normal dorsalis pedis pulse   Skin:     General: Skin is warm and dry.   Neurological:      Mental Status: She is alert and oriented to person, place, and time.   Psychiatric:         Mood and Affect: Mood normal.         Behavior: Behavior normal.         ED Course      Labs Reviewed - No data to display    As Interpreted by me    Imaging Results Available and Reviewed while in ED: XR HIP W OR WO PELVIS 2 OR 3 VIEWS, LEFT (CPT=73502)    Result Date: 6/27/2024  CONCLUSION:  Development of severe left hip joint osteoarthritis since June 2023.  New flattening of the superior left femoral head articular surface may relate to resulting articular surface remodeling, although underlying osteonecrosis with resulting articular surface collapse is also a differential consideration.    Dictated by (CST): Mustapha Barrera MD on 6/27/2024 at 8:33 PM     Finalized by (CST): Mustapha Barrera MD on 6/27/2024 at 8:37 PM          US VENOUS DOPPLER LEG LEFT - DIAG IMG (CPT=93971)    Result Date: 6/27/2024  CONCLUSION:  No DVT in the left lower extremity.    Dictated by (CST): Mustapha Barrera MD on 6/27/2024 at 6:50 PM      Finalized by (CST): Mustapha Barrera MD on 6/27/2024 at 6:51 PM         ED Medications Administered:   Medications   ketorolac (Toradol) 30 MG/ML injection 30 mg (30 mg Intramuscular Given 6/27/24 2102)         MDM     Vitals:    06/27/24 1759 06/27/24 2045 06/27/24 2100   BP: 147/82 147/75 149/80   Pulse: 90 76 78   Resp: 19 20 20   Temp: 98.2 °F (36.8 °C)     TempSrc: Oral     SpO2: 100% 100% 100%   Weight: 76.7 kg     Height: 162.6 cm (5' 4\")       *I personally reviewed and interpreted all ED vitals.    Pulse Ox: 100%, Room air, Normal     Differential Diagnosis/ Diagnostic Considerations: Osteoarthritis of the left hip, DVT, other    Complicating Factors: The patient already has does not have any pertinent problems on file. to contribute to the complexity of this ED evaluation.    Medical Decision Making  The patient presents to the ED with pain in her left hip area.  Concern for osteoarthritis.  Possible fracture however no history of trauma.  Ultrasound negative for DVT.  X-ray confirms severe osteoarthritis of the left hip.  Recommended outpatient orthopedic follow-up and will give pain control for home.    Problems Addressed:  Arthritis of left hip: acute illness or injury    Amount and/or Complexity of Data Reviewed  Radiology: ordered and independent interpretation performed. Decision-making details documented in ED Course.     Details: I personally reviewed the patient's left hip x-ray images and noted osteoarthritis of the left hip    Risk  Prescription drug management.        Condition upon leaving the department: Stable    Disposition and Plan     Clinical Impression:  1. Arthritis of left hip        Disposition:  Discharge    Follow-up:  Jimi Villegas MD  [facility] [address]  [facility] [address]  [phone]    Schedule an appointment as soon as possible for a visit in 3 day(s)        Medications Prescribed:  Discharge Medication List as of 6/27/2024  9:02 PM        START taking these  medications    Details   Ketorolac Tromethamine 10 MG Oral Tab Take 1 tablet (10 mg total) by mouth every 6 (six) hours as needed for Pain., Normal, Disp-20 tablet, R-0

## 2024-07-24 ENCOUNTER — LAB ENCOUNTER (OUTPATIENT)
Dept: LAB | Facility: HOSPITAL | Age: 54
End: 2024-07-24
Attending: INTERNAL MEDICINE
Payer: COMMERCIAL

## 2024-07-24 DIAGNOSIS — K83.1 OBSTRUCTIVE JAUNDICE (HCC): ICD-10-CM

## 2024-07-24 DIAGNOSIS — R74.8 ABNORMAL TRANSAMINASES: ICD-10-CM

## 2024-07-24 DIAGNOSIS — R74.8 ALKALINE PHOSPHATASE ELEVATION: ICD-10-CM

## 2024-07-24 DIAGNOSIS — R17 CHOLESTATIC JAUNDICE: ICD-10-CM

## 2024-07-24 DIAGNOSIS — R79.89 ABNORMAL LFTS (LIVER FUNCTION TESTS): ICD-10-CM

## 2024-07-24 DIAGNOSIS — R17 JAUNDICE: ICD-10-CM

## 2024-07-24 LAB
ALBUMIN SERPL-MCNC: 3.5 G/DL (ref 3.2–4.8)
ALBUMIN/GLOB SERPL: 0.9 {RATIO} (ref 1–2)
ALP LIVER SERPL-CCNC: 324 U/L
ALT SERPL-CCNC: 144 U/L
ANION GAP SERPL CALC-SCNC: 10 MMOL/L (ref 0–18)
AST SERPL-CCNC: 172 U/L (ref ?–34)
BILIRUB SERPL-MCNC: 17.8 MG/DL (ref 0.3–1.2)
BUN BLD-MCNC: 17 MG/DL (ref 9–23)
BUN/CREAT SERPL: 17.9 (ref 10–20)
CALCIUM BLD-MCNC: 8.8 MG/DL (ref 8.7–10.4)
CHLORIDE SERPL-SCNC: 107 MMOL/L (ref 98–112)
CO2 SERPL-SCNC: 25 MMOL/L (ref 21–32)
CREAT BLD-MCNC: 0.95 MG/DL
EGFRCR SERPLBLD CKD-EPI 2021: 70 ML/MIN/1.73M2 (ref 60–?)
FASTING STATUS PATIENT QL REPORTED: NO
GLOBULIN PLAS-MCNC: 4 G/DL (ref 2–3.5)
GLUCOSE BLD-MCNC: 69 MG/DL (ref 70–99)
INR BLD: 1.33 (ref 0.8–1.2)
OSMOLALITY SERPL CALC.SUM OF ELEC: 294 MOSM/KG (ref 275–295)
POTASSIUM SERPL-SCNC: 3.2 MMOL/L (ref 3.5–5.1)
PROT SERPL-MCNC: 7.5 G/DL (ref 5.7–8.2)
PROTHROMBIN TIME: 17.3 SECONDS (ref 11.6–14.8)
SODIUM SERPL-SCNC: 142 MMOL/L (ref 136–145)

## 2024-07-24 PROCEDURE — 80053 COMPREHEN METABOLIC PANEL: CPT

## 2024-07-24 PROCEDURE — 85610 PROTHROMBIN TIME: CPT

## 2024-07-24 PROCEDURE — 36415 COLL VENOUS BLD VENIPUNCTURE: CPT

## 2024-08-13 ENCOUNTER — TELEPHONE (OUTPATIENT)
Dept: FAMILY MEDICINE CLINIC | Facility: CLINIC | Age: 54
End: 2024-08-13

## 2024-08-13 DIAGNOSIS — E87.6 HYPOKALEMIA: Primary | ICD-10-CM

## 2024-08-13 RX ORDER — MYCOPHENOLATE MOFETIL 500 MG/1
500 TABLET ORAL 2 TIMES DAILY
COMMUNITY
Start: 2024-07-10

## 2024-08-14 ENCOUNTER — HOSPITAL ENCOUNTER (OUTPATIENT)
Dept: GENERAL RADIOLOGY | Facility: HOSPITAL | Age: 54
Discharge: HOME | End: 2024-08-14
Attending: FAMILY MEDICINE
Payer: COMMERCIAL

## 2024-08-14 ENCOUNTER — LAB ENCOUNTER (OUTPATIENT)
Dept: LAB | Facility: HOSPITAL | Age: 54
End: 2024-08-14
Attending: FAMILY MEDICINE
Payer: COMMERCIAL

## 2024-08-14 ENCOUNTER — OFFICE VISIT (OUTPATIENT)
Dept: FAMILY MEDICINE CLINIC | Facility: CLINIC | Age: 54
End: 2024-08-14
Payer: COMMERCIAL

## 2024-08-14 VITALS
SYSTOLIC BLOOD PRESSURE: 142 MMHG | DIASTOLIC BLOOD PRESSURE: 82 MMHG | HEIGHT: 64 IN | OXYGEN SATURATION: 100 % | RESPIRATION RATE: 16 BRPM | TEMPERATURE: 97 F | WEIGHT: 170 LBS | HEART RATE: 83 BPM | BODY MASS INDEX: 29.02 KG/M2

## 2024-08-14 DIAGNOSIS — M16.11 ARTHRITIS OF RIGHT HIP: ICD-10-CM

## 2024-08-14 DIAGNOSIS — Z01.818 PREOP EXAMINATION: ICD-10-CM

## 2024-08-14 DIAGNOSIS — I10 PRIMARY HYPERTENSION: ICD-10-CM

## 2024-08-14 DIAGNOSIS — K74.3 PRIMARY BILIARY CHOLANGITIS (HCC): ICD-10-CM

## 2024-08-14 DIAGNOSIS — E87.6 HYPOKALEMIA: ICD-10-CM

## 2024-08-14 DIAGNOSIS — K83.1: ICD-10-CM

## 2024-08-14 DIAGNOSIS — Z01.818 PREOP EXAMINATION: Primary | ICD-10-CM

## 2024-08-14 DIAGNOSIS — Z01.812 PRE-OPERATIVE LABORATORY EXAMINATION: ICD-10-CM

## 2024-08-14 DIAGNOSIS — R74.01 TRANSAMINITIS: ICD-10-CM

## 2024-08-14 DIAGNOSIS — E80.6 HYPERBILIRUBINEMIA: ICD-10-CM

## 2024-08-14 DIAGNOSIS — R79.1 ELEVATED INR: ICD-10-CM

## 2024-08-14 DIAGNOSIS — K75.4 AUTOIMMUNE HEPATITIS (HCC): ICD-10-CM

## 2024-08-14 PROBLEM — Z09 POSTOPERATIVE EXAMINATION: Status: RESOLVED | Noted: 2022-12-13 | Resolved: 2024-08-14

## 2024-08-14 LAB
ALBUMIN SERPL-MCNC: 3.5 G/DL (ref 3.2–4.8)
ALBUMIN/GLOB SERPL: 1 {RATIO} (ref 1–2)
ALP LIVER SERPL-CCNC: 285 U/L
ALT SERPL-CCNC: 123 U/L
ANION GAP SERPL CALC-SCNC: 10 MMOL/L (ref 0–18)
ANTIBODY SCREEN: NEGATIVE
APTT PPP: 33.1 SECONDS (ref 23–36)
AST SERPL-CCNC: 162 U/L (ref ?–34)
ATRIAL RATE: 74 BPM
BASOPHILS # BLD AUTO: 0.03 X10(3) UL (ref 0–0.2)
BASOPHILS NFR BLD AUTO: 0.2 %
BILIRUB SERPL-MCNC: 18.7 MG/DL (ref 0.3–1.2)
BILIRUB UR QL CFM: POSITIVE
BUN BLD-MCNC: 12 MG/DL (ref 9–23)
BUN/CREAT SERPL: 13.5 (ref 10–20)
CALCIUM BLD-MCNC: 8.9 MG/DL (ref 8.7–10.4)
CHLORIDE SERPL-SCNC: 110 MMOL/L (ref 98–112)
CLARITY UR: CLEAR
CO2 SERPL-SCNC: 23 MMOL/L (ref 21–32)
CREAT BLD-MCNC: 0.89 MG/DL
DEPRECATED RDW RBC AUTO: 61.5 FL (ref 35.1–46.3)
EGFRCR SERPLBLD CKD-EPI 2021: 76 ML/MIN/1.73M2 (ref 60–?)
EOSINOPHIL # BLD AUTO: 0 X10(3) UL (ref 0–0.7)
EOSINOPHIL NFR BLD AUTO: 0 %
ERYTHROCYTE [DISTWIDTH] IN BLOOD BY AUTOMATED COUNT: 17.5 % (ref 11–15)
FASTING STATUS PATIENT QL REPORTED: YES
GLOBULIN PLAS-MCNC: 3.6 G/DL (ref 2–3.5)
GLUCOSE BLD-MCNC: 92 MG/DL (ref 70–99)
GLUCOSE UR-MCNC: NORMAL MG/DL
HCT VFR BLD AUTO: 34.6 %
HGB BLD-MCNC: 11.3 G/DL
IMM GRANULOCYTES # BLD AUTO: 0.11 X10(3) UL (ref 0–1)
IMM GRANULOCYTES NFR BLD: 0.9 %
INR BLD: 1.49 (ref 0.8–1.2)
KETONES UR-MCNC: NEGATIVE MG/DL
LEUKOCYTE ESTERASE UR QL STRIP.AUTO: 25
LYMPHOCYTES # BLD AUTO: 3.12 X10(3) UL (ref 1–4)
LYMPHOCYTES NFR BLD AUTO: 25 %
MCH RBC QN AUTO: 31 PG (ref 26–34)
MCHC RBC AUTO-ENTMCNC: 32.7 G/DL (ref 31–37)
MCV RBC AUTO: 95.1 FL
MONOCYTES # BLD AUTO: 0.72 X10(3) UL (ref 0.1–1)
MONOCYTES NFR BLD AUTO: 5.8 %
NEUTROPHILS # BLD AUTO: 8.48 X10 (3) UL (ref 1.5–7.7)
NEUTROPHILS # BLD AUTO: 8.48 X10(3) UL (ref 1.5–7.7)
NEUTROPHILS NFR BLD AUTO: 68.1 %
NITRITE UR QL STRIP.AUTO: NEGATIVE
OSMOLALITY SERPL CALC.SUM OF ELEC: 295 MOSM/KG (ref 275–295)
P AXIS: 52 DEGREES
P-R INTERVAL: 134 MS
PH UR: 6.5 [PH] (ref 5–8)
PLATELET # BLD AUTO: 209 10(3)UL (ref 150–450)
POTASSIUM SERPL-SCNC: 3 MMOL/L (ref 3.5–5.1)
PROT SERPL-MCNC: 7.1 G/DL (ref 5.7–8.2)
PROT UR-MCNC: NEGATIVE MG/DL
PROTHROMBIN TIME: 18.9 SECONDS (ref 11.6–14.8)
Q-T INTERVAL: 390 MS
QRS DURATION: 92 MS
QTC CALCULATION (BEZET): 432 MS
R AXIS: 22 DEGREES
RBC # BLD AUTO: 3.64 X10(6)UL
RH BLOOD TYPE: POSITIVE
SODIUM SERPL-SCNC: 143 MMOL/L (ref 136–145)
SP GR UR STRIP: 1.01 (ref 1–1.03)
T AXIS: 42 DEGREES
UROBILINOGEN UR STRIP-ACNC: NORMAL
VENTRICULAR RATE: 74 BPM
WBC # BLD AUTO: 12.5 X10(3) UL (ref 4–11)

## 2024-08-14 PROCEDURE — 99244 OFF/OP CNSLTJ NEW/EST MOD 40: CPT | Performed by: FAMILY MEDICINE

## 2024-08-14 PROCEDURE — 3079F DIAST BP 80-89 MM HG: CPT | Performed by: FAMILY MEDICINE

## 2024-08-14 PROCEDURE — 93005 ELECTROCARDIOGRAM TRACING: CPT

## 2024-08-14 PROCEDURE — 86850 RBC ANTIBODY SCREEN: CPT | Performed by: FAMILY MEDICINE

## 2024-08-14 PROCEDURE — 85610 PROTHROMBIN TIME: CPT | Performed by: FAMILY MEDICINE

## 2024-08-14 PROCEDURE — 3077F SYST BP >= 140 MM HG: CPT | Performed by: FAMILY MEDICINE

## 2024-08-14 PROCEDURE — 86901 BLOOD TYPING SEROLOGIC RH(D): CPT | Performed by: FAMILY MEDICINE

## 2024-08-14 PROCEDURE — 85730 THROMBOPLASTIN TIME PARTIAL: CPT | Performed by: FAMILY MEDICINE

## 2024-08-14 PROCEDURE — 86900 BLOOD TYPING SEROLOGIC ABO: CPT | Performed by: FAMILY MEDICINE

## 2024-08-14 PROCEDURE — 81001 URINALYSIS AUTO W/SCOPE: CPT | Performed by: FAMILY MEDICINE

## 2024-08-14 PROCEDURE — 80053 COMPREHEN METABOLIC PANEL: CPT | Performed by: FAMILY MEDICINE

## 2024-08-14 PROCEDURE — 3008F BODY MASS INDEX DOCD: CPT | Performed by: FAMILY MEDICINE

## 2024-08-14 PROCEDURE — 85025 COMPLETE CBC W/AUTO DIFF WBC: CPT | Performed by: FAMILY MEDICINE

## 2024-08-14 PROCEDURE — 71046 X-RAY EXAM CHEST 2 VIEWS: CPT | Performed by: FAMILY MEDICINE

## 2024-08-14 PROCEDURE — 87086 URINE CULTURE/COLONY COUNT: CPT | Performed by: FAMILY MEDICINE

## 2024-08-14 PROCEDURE — 93010 ELECTROCARDIOGRAM REPORT: CPT | Performed by: INTERNAL MEDICINE

## 2024-08-14 PROCEDURE — 87081 CULTURE SCREEN ONLY: CPT | Performed by: FAMILY MEDICINE

## 2024-08-14 NOTE — TELEPHONE ENCOUNTER
Verbal order from Dr. Solorzano, to have patient take K-Dur 20meq BID x5 days. Repeat CBC and BMP next Wed 08/21/24.

## 2024-08-14 NOTE — TELEPHONE ENCOUNTER
Dr. Solorzano, please review:    Labs- WBC (12.5)- previous WBC WNL- pt is on 20mg Prednisone daily, RBC (3.64), HGB (11.3), Hematocrit (34.6), RDW-SD (61.5), RDW (17.5), Neutrophil Absolute (8.48), Potassium (3.0), ALT (123), AST (162), Alk Phos (285), total bilirubin (18.7), Ictotest +,  globulin (3.6), PT (18.9), INR (149),  Urine Culture no growth, MRSA neg, all other labs WNL    EKG- normal sinus rhythm, minimal voltage criteria for LVH, may be normal variant. No previous EKG    X-ray- WNL    Please advise on Hypokalemia.

## 2024-08-14 NOTE — TELEPHONE ENCOUNTER
LTHA (Anterior) on 08/28/24 with Dr. Behery @ [facility]    H&P- completed 08/14/24  Labs- WBC (12.5)- previous WBC WNL, RBC (3.64), HGB (11.3), Hematocrit (34.6), RDW-SD (61.5), RDW (17.5), Neutrophil Absolute (8.48), Potassium (3.0), ALT (123), AST (162), Alk Phos (285), total bilirubin (18.7), Ictotest +,  globulin (3.6), PT (18.9), INR (149),  Urine Culture no growth, MRSA neg, all other labs WNL    EKG- normal sinus rhythm, minimal voltage criteria for LVH, may be normal variant. No previous EKG    X-ray- WNL    Stress Dose Steroids order will sent to PAT and scanning 08/15/24    Hepatologist- Shahab Ness MD   Last seen: 06/25/24  Phone: [phone]   Fax: [phone]  Hepatolgy Clx faxed 08/15/24

## 2024-08-15 RX ORDER — POTASSIUM CHLORIDE 1500 MG/1
20 TABLET, EXTENDED RELEASE ORAL 2 TIMES DAILY
Qty: 10 TABLET | Refills: 0 | Status: ON HOLD | OUTPATIENT
Start: 2024-08-15 | End: 2024-08-28

## 2024-08-15 NOTE — H&P
[facility] PRE-OP CLINIC [place]    PRE-OP NOTE    HPI:   I have been consulted by Dr. Behery to see Cammie Nunn 54 year old female for a preoperative evaluation and medical clearance. Cammie has a long history of worsening severe degenerative arthritis left hip. Patient is to have left MALCOLM by Dr. Behery on 8/28/24.     Pt suffers significant pain and loss of function.     No cardiopulmonary symptoms.      No history of DONG or DVT. Denies tobacco use.      Pt has a very complicated serious liver condition (primary biliary cholangitis, auto immune hepatitis) and has been non compliant with treatment and is now on high dose steroids.       Family History   Problem Relation Age of Onset    Lipids Mother     Hypertension Mother     Diabetes Mother     Cancer Mother       Current Outpatient Medications   Medication Sig Dispense Refill    Mycophenolate Mofetil 500 MG Oral Tab Take 1 tablet (500 mg total) by mouth 2 (two) times daily.      PREDNISONE 10 MG Oral Tab TAKE 4 TABLETS BY MOUTH ONCE DAILY (Patient taking differently: Take 2 tablets (20 mg total) by mouth daily.) 120 tablet 0    ursodiol 300 MG Oral Cap Take 2 capsules (600 mg total) by mouth 2 (two) times daily. 360 capsule 3     Past Medical History:    Disorder of liver    autoimmune disease    Essential hypertension    not taking medications    High blood pressure    off medications since 2022 per patient    High cholesterol    Mirizzi's syndrome (HCC)    Osteoarthritis    Visual impairment     Past Surgical History:   Procedure Laterality Date    Cholecystectomy      Colonoscopy N/A 12/12/2023    Procedure: COLONOSCOPY;  Surgeon: Jeison Diamond MD;  Location: [facility] ENDOSCOPY    Tonsillectomy       Social History     Socioeconomic History    Marital status: Single     Spouse name: Not on file    Number of children: Not on file    Years of education: Not on file    Highest education level: Not on file   Occupational History    Not on  file   Tobacco Use    Smoking status: Never     Passive exposure: Never    Smokeless tobacco: Never   Vaping Use    Vaping status: Never Used   Substance and Sexual Activity    Alcohol use: Not Currently     Comment: Every 1 now and then    Drug use: Never    Sexual activity: Not on file   Other Topics Concern    Not on file   Social History Narrative    Not on file     Social Determinants of Health     Financial Resource Strain: Not on file   Food Insecurity: Not on file   Transportation Needs: Not on file   Physical Activity: Not on file   Stress: Not on file   Social Connections: Not on file   Housing Stability: Not on file       REVIEW OF SYSTEMS:   CONSTITUTIONAL:  Denies unusual weight gain/loss, fever, chills  EENT:  Eyes:  Denies eye pain, visual loss, blurred vision, double vision. Ears, Nose, Throat:  Denies congestion, runny nose or sore throat.  INTEGUMENTARY:  Denies rashes, itching, skin lesion,   CARDIOVASCULAR:  Denies DVT. Denies chest pain, palpitations, edema, dyspnea  RESPIRATORY: no DONG ,Denies shortness of breath, wheezing, cough  GASTROINTESTINAL:  severe liver disease  MUSCULOSKELETAL: severe pain as noted above  NEUROLOGICAL:  Denies headache, seizures, dizziness, syncope, paralysis, ataxia,  HEMATOLOGIC:  Denies anemia, bleeding or bruising.  LYMPHATICS:  Denies enlarged nodes   PSYCHIATRIC:  Denies depression or anxiety.  ENDOCRINOLOGIC: DM 2 no,   ALLERGIES:  Denies allergic response, history of asthma, hives,     EXAM:   /82 (BP Location: Left arm)   Pulse 83   Temp 96.8 °F (36 °C) (Temporal)   Resp 16   Ht 5' 4\" (1.626 m)   Wt 170 lb (77.1 kg)   LMP 10/17/2018   SpO2 100%   BMI 29.18 kg/m²  Estimated body mass index is 29.18 kg/m² as calculated from the following:    Height as of this encounter: 5' 4\" (1.626 m).    Weight as of this encounter: 170 lb (77.1 kg).   Vital signs reviewed.Appears stated age, well groomed.  Physical Exam:  GEN:  Patient is alert, awake and  oriented, well developed, well nourished, no apparent distress.  HEENT:  Head:  Normocephalic, atraumatic Eyes: EOMI,no scleral icterus, conjunctivae clear bilaterally, no eye discharge Nose: patent, no nasal discharge  NECK: Supple, no CLAD, no carotid bruit, no thyromegaly.  SKIN: No rashes, no skin lesion, no bruising, good turgor.  HEART:  Regular rate and rhythm, no murmurs, rubs or gallops.  LUNGS: Clear to auscultation bilterally, no rales/rhonchi/wheezing.  CHEST: No tenderness.  ABDOMEN:  Soft, nondistended, nontender,  no masses, no hepatosplenomegaly.  BACK: No tenderness, no spasm,   EXTREMITIES:  No edema, no cyanosis, no clubbing,   NEURO:  No focal deficit, speech fluent, normal gait, strength and tone, sensory intact      Lab Results   Component Value Date    WBC 12.5 (H) 08/14/2024    HGB 11.3 (L) 08/14/2024    HCT 34.6 (L) 08/14/2024    .0 08/14/2024    CREATSERUM 0.89 08/14/2024    BUN 12 08/14/2024     08/14/2024    K 3.0 (L) 08/14/2024     08/14/2024    CO2 23.0 08/14/2024    GLU 92 08/14/2024    CA 8.9 08/14/2024    ALB 3.5 08/14/2024    ALKPHO 285 (H) 08/14/2024    BILT 18.7 (H) 08/14/2024    TP 7.1 08/14/2024     (H) 08/14/2024     (H) 08/14/2024    PTT 33.1 08/14/2024    INR 1.49 (H) 08/14/2024    LIP 96 11/27/2022    MG 1.9 12/03/2022    PHOS 3.6 12/03/2022       XR CHEST PA + LAT CHEST (CPT=71046)    Result Date: 8/14/2024  CONCLUSION:  1. No acute disease in the chest.    Dictated by (CST): Willie Sullivan MD on 8/14/2024 at 12:39 PM     Finalized by (CST): Willie Sullivan MD on 8/14/2024 at 12:39 PM           EKG 12 Lead    Result Date: 8/14/2024  Normal sinus rhythm Minimal voltage criteria for LVH, may be normal variant ( R in aVL ) Borderline ECG No previous ECGs found in Muse Confirmed by CANDIE FERRARO MICHAEL ([id number]) on 8/14/2024 3:42:49 PM     ASSESSMENT AND PLAN:   Cammie Nunn is a 54 year old female, with a hx of severe degenerative  arthritis left hip who presents for a pre-operative physical exam. Patient is to have left MALCOLM by Dr. Behery on 8/28/24.     Arthritis of right hip  M16.11        Mirizzi's syndrome (HCC)  K83.1        Primary biliary cholangitis (HCC)  K74.3        Autoimmune hepatitis (HCC)  K75.4        Primary hypertension  I10        Elevated INR  R79.1        Transaminitis  R74.01        Hyperbilirubinemia  E80.6        Hypokalemia  E87.6              ECG and CXR are acceptable for surgery. Potassium supplement started and repeat labs ordered.       Preoperative Risk Stratification: There are no decompensated medical conditions. ASA classification 3      Patient has a moderately elevated risk for surgery due to serious liver disease. Although her INR is only modestly elevated, she has a significant risk of increase blood loss with joint replacement. She also may be difficult to manage with anticoagulation post operatively for DVT prevention.      Plan:     -hepatology recommendation for post op anticoagulation.     -she will need steroid stress doses in perioperative period    -repeat labs to follow up on K+ and LFTs    -Addendum to follow        Thank you for the opportunity to care for your patient       Endy Solorzano MD  8/14/2024  7:20 PM

## 2024-08-15 NOTE — TELEPHONE ENCOUNTER
Aagaard, Jon P, MD Stromberg, Elizabeth, LPN Liz, after reviewing Cammie's history and risks, she does not need to see hematology. Also, please contact her hepatologist to see if he has any concerns with total hip replacement. Also what would he recommend for post op anticoagulation. Thanks!

## 2024-08-15 NOTE — TELEPHONE ENCOUNTER
Spoke to patient, she will  potassium and start 20meq BID x5 days then get BMP and CBC retested at [facility]. Orders entered and potassium sent to pharmacy.  Went over all test result with her.   We will call her when we get Hepatology Clx and recommendations.

## 2024-08-15 NOTE — H&P (VIEW-ONLY)
[facility] PRE-OP CLINIC [place]    PRE-OP NOTE    HPI:   I have been consulted by Dr. Behery to see Cammie Nunn 54 year old female for a preoperative evaluation and medical clearance. Cammie has a long history of worsening severe degenerative arthritis left hip. Patient is to have left MALCOLM by Dr. Behery on 8/28/24.     Pt suffers significant pain and loss of function.     No cardiopulmonary symptoms.      No history of DONG or DVT. Denies tobacco use.      Pt has a very complicated serious liver condition (primary biliary cholangitis, auto immune hepatitis) and has been non compliant with treatment and is now on high dose steroids.       Family History   Problem Relation Age of Onset    Lipids Mother     Hypertension Mother     Diabetes Mother     Cancer Mother       Current Outpatient Medications   Medication Sig Dispense Refill    Mycophenolate Mofetil 500 MG Oral Tab Take 1 tablet (500 mg total) by mouth 2 (two) times daily.      PREDNISONE 10 MG Oral Tab TAKE 4 TABLETS BY MOUTH ONCE DAILY (Patient taking differently: Take 2 tablets (20 mg total) by mouth daily.) 120 tablet 0    ursodiol 300 MG Oral Cap Take 2 capsules (600 mg total) by mouth 2 (two) times daily. 360 capsule 3     Past Medical History:    Disorder of liver    autoimmune disease    Essential hypertension    not taking medications    High blood pressure    off medications since 2022 per patient    High cholesterol    Mirizzi's syndrome (HCC)    Osteoarthritis    Visual impairment     Past Surgical History:   Procedure Laterality Date    Cholecystectomy      Colonoscopy N/A 12/12/2023    Procedure: COLONOSCOPY;  Surgeon: Jeison Diamond MD;  Location: [facility] ENDOSCOPY    Tonsillectomy       Social History     Socioeconomic History    Marital status: Single     Spouse name: Not on file    Number of children: Not on file    Years of education: Not on file    Highest education level: Not on file   Occupational History    Not on  file   Tobacco Use    Smoking status: Never     Passive exposure: Never    Smokeless tobacco: Never   Vaping Use    Vaping status: Never Used   Substance and Sexual Activity    Alcohol use: Not Currently     Comment: Every 1 now and then    Drug use: Never    Sexual activity: Not on file   Other Topics Concern    Not on file   Social History Narrative    Not on file     Social Determinants of Health     Financial Resource Strain: Not on file   Food Insecurity: Not on file   Transportation Needs: Not on file   Physical Activity: Not on file   Stress: Not on file   Social Connections: Not on file   Housing Stability: Not on file       REVIEW OF SYSTEMS:   CONSTITUTIONAL:  Denies unusual weight gain/loss, fever, chills  EENT:  Eyes:  Denies eye pain, visual loss, blurred vision, double vision. Ears, Nose, Throat:  Denies congestion, runny nose or sore throat.  INTEGUMENTARY:  Denies rashes, itching, skin lesion,   CARDIOVASCULAR:  Denies DVT. Denies chest pain, palpitations, edema, dyspnea  RESPIRATORY: no DONG ,Denies shortness of breath, wheezing, cough  GASTROINTESTINAL:  severe liver disease  MUSCULOSKELETAL: severe pain as noted above  NEUROLOGICAL:  Denies headache, seizures, dizziness, syncope, paralysis, ataxia,  HEMATOLOGIC:  Denies anemia, bleeding or bruising.  LYMPHATICS:  Denies enlarged nodes   PSYCHIATRIC:  Denies depression or anxiety.  ENDOCRINOLOGIC: DM 2 no,   ALLERGIES:  Denies allergic response, history of asthma, hives,     EXAM:   /82 (BP Location: Left arm)   Pulse 83   Temp 96.8 °F (36 °C) (Temporal)   Resp 16   Ht 5' 4\" (1.626 m)   Wt 170 lb (77.1 kg)   LMP 10/17/2018   SpO2 100%   BMI 29.18 kg/m²  Estimated body mass index is 29.18 kg/m² as calculated from the following:    Height as of this encounter: 5' 4\" (1.626 m).    Weight as of this encounter: 170 lb (77.1 kg).   Vital signs reviewed.Appears stated age, well groomed.  Physical Exam:  GEN:  Patient is alert, awake and  oriented, well developed, well nourished, no apparent distress.  HEENT:  Head:  Normocephalic, atraumatic Eyes: EOMI,no scleral icterus, conjunctivae clear bilaterally, no eye discharge Nose: patent, no nasal discharge  NECK: Supple, no CLAD, no carotid bruit, no thyromegaly.  SKIN: No rashes, no skin lesion, no bruising, good turgor.  HEART:  Regular rate and rhythm, no murmurs, rubs or gallops.  LUNGS: Clear to auscultation bilterally, no rales/rhonchi/wheezing.  CHEST: No tenderness.  ABDOMEN:  Soft, nondistended, nontender,  no masses, no hepatosplenomegaly.  BACK: No tenderness, no spasm,   EXTREMITIES:  No edema, no cyanosis, no clubbing,   NEURO:  No focal deficit, speech fluent, normal gait, strength and tone, sensory intact      Lab Results   Component Value Date    WBC 12.5 (H) 08/14/2024    HGB 11.3 (L) 08/14/2024    HCT 34.6 (L) 08/14/2024    .0 08/14/2024    CREATSERUM 0.89 08/14/2024    BUN 12 08/14/2024     08/14/2024    K 3.0 (L) 08/14/2024     08/14/2024    CO2 23.0 08/14/2024    GLU 92 08/14/2024    CA 8.9 08/14/2024    ALB 3.5 08/14/2024    ALKPHO 285 (H) 08/14/2024    BILT 18.7 (H) 08/14/2024    TP 7.1 08/14/2024     (H) 08/14/2024     (H) 08/14/2024    PTT 33.1 08/14/2024    INR 1.49 (H) 08/14/2024    LIP 96 11/27/2022    MG 1.9 12/03/2022    PHOS 3.6 12/03/2022       XR CHEST PA + LAT CHEST (CPT=71046)    Result Date: 8/14/2024  CONCLUSION:  1. No acute disease in the chest.    Dictated by (CST): Willie Sullivan MD on 8/14/2024 at 12:39 PM     Finalized by (CST): Willie Sullivan MD on 8/14/2024 at 12:39 PM           EKG 12 Lead    Result Date: 8/14/2024  Normal sinus rhythm Minimal voltage criteria for LVH, may be normal variant ( R in aVL ) Borderline ECG No previous ECGs found in [facility] Confirmed by CANDIE FERRARO MICHAEL ([id number]) on 8/14/2024 3:42:49 PM     ASSESSMENT AND PLAN:   Cammie Nunn is a 54 year old female, with a hx of severe degenerative  arthritis left hip who presents for a pre-operative physical exam. Patient is to have left MALCOLM by Dr. Behery on 8/28/24.     Arthritis of right hip  M16.11        Mirizzi's syndrome (HCC)  K83.1        Primary biliary cholangitis (HCC)  K74.3        Autoimmune hepatitis (HCC)  K75.4        Primary hypertension  I10        Elevated INR  R79.1        Transaminitis  R74.01        Hyperbilirubinemia  E80.6        Hypokalemia  E87.6              ECG and CXR are acceptable for surgery. Potassium supplement started and repeat labs ordered.       Preoperative Risk Stratification: There are no decompensated medical conditions. ASA classification 3      Patient has a moderately elevated risk for surgery due to serious liver disease. Although her INR is only modestly elevated, she has a significant risk of increase blood loss with joint replacement. She also may be difficult to manage with anticoagulation post operatively for DVT prevention.      Plan:     -hepatology recommendation for post op anticoagulation.     -she will need steroid stress doses in perioperative period    -repeat labs to follow up on K+ and LFTs    -Addendum to follow        Thank you for the opportunity to care for your patient       Endy Solorzano MD  8/14/2024  7:20 PM

## 2024-08-15 NOTE — TELEPHONE ENCOUNTER
Patient has a significantly elevated risk for surgery due to severe liver disease. Although her INR is only modestly elevated, she has a significant risk of increase blood loss with joint replacement. She also may be difficult to manage with anticoagulation post operatively for DVT prevention.     I recommend pt receive clearance for her hepatologist and also hematologist before proceeding with surgery to address these concerns

## 2024-08-15 NOTE — TELEPHONE ENCOUNTER
Spoke to Dr. Ness, patient is having an MRI of liver and bile ducts in 2 days. He is ok with SDS perioperatively. After looking at test results from Pre-Op he is going to decrease Cellcept to 300mg BID and Prednisone to 10mg daily. Dr. Ness's office will let us know if patient is clear or not for surgery after MRI.

## 2024-08-21 NOTE — TELEPHONE ENCOUNTER
CMP done 08/20 w/Dr. Ness: Albumin 3.1, , Alk Phosphatase 274, , Bilirubin tot 23.2    MRI MRCP Abdomen 08/17/2024 @ [facility]: No hepatic mass lesion or significant biliary dilation detected. Mild enlargement of portacaval node. Cholecystectomy    Per MARCELLE w/Dr. Ness 08/20/2024  Telephone Encounter - Marge Lambert, RN - 08/20/2024 4:54 PM CDT  Formatting of this note might be different from the original.  I left a VM requesting a call back.  ----------------------------------------------------------------  Per :    Can you clarify if she took the vitamin K and also have her get the repeat labs - she did not do when she went for MRI.    MRCP with no overt biliary issue. Did not repeat labs.    Want to see INR correction with vitamin K before considering any clearance for surgery.     Pts INR 08/20/2024: 1.4

## 2024-08-23 NOTE — TELEPHONE ENCOUNTER
LM for Dr. Ness's office to call back with update if patient is clear or not for upcoming surgery.

## 2024-08-23 NOTE — TELEPHONE ENCOUNTER
Spoke to Dr. Ness Hepatology. He said patient is high risk for surgery but understands she is in need of this surgery urgently and it could be some time before LFTs and bilirubin come down to be in a better position for surgery. She is also pretty noncompliant with meds. PLTs ok, potassium WNL, DVT prophylaxis ASA ok. She will remain jaundice for a while until levels normalize eventually. Spoke to Gricelda RUIZ with Dr. Behery's office and gave her this info. She will let us know if they decide to go through with surgery.

## 2024-08-26 NOTE — TELEPHONE ENCOUNTER
Consult from Dr. Ness noted. Although pt is at increased risk for surgery, the risk is acceptable due to her urgent need for surgery.    I recommend avoiding general anesthesia if possible. She is also mildly anticoagulated with baseline INR of 1.3 - 1.4.  Dr. Ness feels use of aspirin for anticoagulation post op is acceptable.

## 2024-08-27 NOTE — TELEPHONE ENCOUNTER
Spoke to patient, went over Dr. Ness, and Dr. Solorzano's recommendations for surgery. She is clear for surgery at HIGH risk. Patient understands this. Spoke to PAT, they will run stat CBC on admission tomorrow. Asked Dr. Behery's office to contact the patient since he had a few additional questions about staying overnight at the hospital and post op meds.

## 2024-08-28 ENCOUNTER — ANESTHESIA (OUTPATIENT)
Dept: SURGERY | Facility: HOSPITAL | Age: 54
End: 2024-08-28
Payer: COMMERCIAL

## 2024-08-28 ENCOUNTER — HOSPITAL ENCOUNTER (OUTPATIENT)
Facility: HOSPITAL | Age: 54
Discharge: HOME HEALTH | End: 2024-08-29
Attending: STUDENT IN AN ORGANIZED HEALTH CARE EDUCATION/TRAINING PROGRAM | Admitting: STUDENT IN AN ORGANIZED HEALTH CARE EDUCATION/TRAINING PROGRAM
Payer: COMMERCIAL

## 2024-08-28 ENCOUNTER — ANESTHESIA EVENT (OUTPATIENT)
Dept: SURGERY | Facility: HOSPITAL | Age: 54
End: 2024-08-28
Payer: COMMERCIAL

## 2024-08-28 ENCOUNTER — APPOINTMENT (OUTPATIENT)
Dept: GENERAL RADIOLOGY | Facility: HOSPITAL | Age: 54
End: 2024-08-28
Attending: STUDENT IN AN ORGANIZED HEALTH CARE EDUCATION/TRAINING PROGRAM
Payer: COMMERCIAL

## 2024-08-28 DIAGNOSIS — S72.045A CLOSED NONDISPLACED BASICERVICAL FRACTURE OF LEFT FEMUR, INITIAL ENCOUNTER (HCC): ICD-10-CM

## 2024-08-28 PROBLEM — Z79.52 CURRENT CHRONIC USE OF SYSTEMIC STEROIDS: Status: ACTIVE | Noted: 2024-08-28

## 2024-08-28 PROBLEM — Z79.51: Status: ACTIVE | Noted: 2024-08-28

## 2024-08-28 PROBLEM — Z96.641 STATUS POST RIGHT HIP REPLACEMENT: Status: ACTIVE | Noted: 2024-08-28

## 2024-08-28 PROBLEM — J44.9: Status: ACTIVE | Noted: 2024-08-28

## 2024-08-28 PROBLEM — Z79.51: Status: RESOLVED | Noted: 2024-08-28 | Resolved: 2024-08-28

## 2024-08-28 PROBLEM — J44.9: Status: RESOLVED | Noted: 2024-08-28 | Resolved: 2024-08-28

## 2024-08-28 LAB
ANION GAP SERPL CALC-SCNC: 10 MMOL/L (ref 0–18)
BASOPHILS # BLD AUTO: 0.03 X10(3) UL (ref 0–0.2)
BASOPHILS NFR BLD AUTO: 0.3 %
BUN BLD-MCNC: 12 MG/DL (ref 9–23)
BUN/CREAT SERPL: 15.2 (ref 10–20)
CALCIUM BLD-MCNC: 8.7 MG/DL (ref 8.7–10.4)
CHLORIDE SERPL-SCNC: 109 MMOL/L (ref 98–112)
CO2 SERPL-SCNC: 23 MMOL/L (ref 21–32)
CREAT BLD-MCNC: 0.79 MG/DL
DEPRECATED RDW RBC AUTO: 60.3 FL (ref 35.1–46.3)
EGFRCR SERPLBLD CKD-EPI 2021: 88 ML/MIN/1.73M2 (ref 60–?)
EOSINOPHIL # BLD AUTO: 0 X10(3) UL (ref 0–0.7)
EOSINOPHIL NFR BLD AUTO: 0 %
ERYTHROCYTE [DISTWIDTH] IN BLOOD BY AUTOMATED COUNT: 17.3 % (ref 11–15)
GLUCOSE BLD-MCNC: 134 MG/DL (ref 70–99)
HCT VFR BLD AUTO: 28.7 %
HCT VFR BLD AUTO: 32.6 %
HGB BLD-MCNC: 10.8 G/DL
HGB BLD-MCNC: 9.2 G/DL
IMM GRANULOCYTES # BLD AUTO: 0.11 X10(3) UL (ref 0–1)
IMM GRANULOCYTES NFR BLD: 1 %
INR BLD: 1.28 (ref 0.8–1.2)
LYMPHOCYTES # BLD AUTO: 2.25 X10(3) UL (ref 1–4)
LYMPHOCYTES NFR BLD AUTO: 21.3 %
MCH RBC QN AUTO: 31.9 PG (ref 26–34)
MCHC RBC AUTO-ENTMCNC: 33.1 G/DL (ref 31–37)
MCV RBC AUTO: 96.2 FL
MONOCYTES # BLD AUTO: 0.65 X10(3) UL (ref 0.1–1)
MONOCYTES NFR BLD AUTO: 6.2 %
NEUTROPHILS # BLD AUTO: 7.5 X10 (3) UL (ref 1.5–7.7)
NEUTROPHILS # BLD AUTO: 7.5 X10(3) UL (ref 1.5–7.7)
NEUTROPHILS NFR BLD AUTO: 71.2 %
OSMOLALITY SERPL CALC.SUM OF ELEC: 296 MOSM/KG (ref 275–295)
PLATELET # BLD AUTO: 153 10(3)UL (ref 150–450)
POTASSIUM SERPL-SCNC: 3.9 MMOL/L (ref 3.5–5.1)
PROTHROMBIN TIME: 16.8 SECONDS (ref 11.6–14.8)
RBC # BLD AUTO: 3.39 X10(6)UL
SODIUM SERPL-SCNC: 142 MMOL/L (ref 136–145)
WBC # BLD AUTO: 10.5 X10(3) UL (ref 4–11)

## 2024-08-28 PROCEDURE — 80048 BASIC METABOLIC PNL TOTAL CA: CPT | Performed by: STUDENT IN AN ORGANIZED HEALTH CARE EDUCATION/TRAINING PROGRAM

## 2024-08-28 PROCEDURE — 88311 DECALCIFY TISSUE: CPT | Performed by: STUDENT IN AN ORGANIZED HEALTH CARE EDUCATION/TRAINING PROGRAM

## 2024-08-28 PROCEDURE — 88305 TISSUE EXAM BY PATHOLOGIST: CPT | Performed by: STUDENT IN AN ORGANIZED HEALTH CARE EDUCATION/TRAINING PROGRAM

## 2024-08-28 PROCEDURE — 72170 X-RAY EXAM OF PELVIS: CPT | Performed by: STUDENT IN AN ORGANIZED HEALTH CARE EDUCATION/TRAINING PROGRAM

## 2024-08-28 PROCEDURE — 85025 COMPLETE CBC W/AUTO DIFF WBC: CPT | Performed by: FAMILY MEDICINE

## 2024-08-28 PROCEDURE — 85014 HEMATOCRIT: CPT | Performed by: STUDENT IN AN ORGANIZED HEALTH CARE EDUCATION/TRAINING PROGRAM

## 2024-08-28 PROCEDURE — 85018 HEMOGLOBIN: CPT | Performed by: STUDENT IN AN ORGANIZED HEALTH CARE EDUCATION/TRAINING PROGRAM

## 2024-08-28 PROCEDURE — 85610 PROTHROMBIN TIME: CPT | Performed by: ANESTHESIOLOGY

## 2024-08-28 PROCEDURE — 76000 FLUOROSCOPY <1 HR PHYS/QHP: CPT | Performed by: STUDENT IN AN ORGANIZED HEALTH CARE EDUCATION/TRAINING PROGRAM

## 2024-08-28 PROCEDURE — 0SRB049 REPLACEMENT OF LEFT HIP JOINT WITH CERAMIC ON POLYETHYLENE SYNTHETIC SUBSTITUTE, CEMENTED, OPEN APPROACH: ICD-10-PCS | Performed by: STUDENT IN AN ORGANIZED HEALTH CARE EDUCATION/TRAINING PROGRAM

## 2024-08-28 DEVICE — BIOLOX DELTA CERAMIC FEMORAL HEAD +1.5 36MM DIA 12/14 TAPER
Type: IMPLANTABLE DEVICE | Site: HIP | Status: FUNCTIONAL
Brand: BIOLOX DELTA

## 2024-08-28 DEVICE — C-STEM VOID CENTRALISER 10MM
Type: IMPLANTABLE DEVICE | Site: HIP | Status: FUNCTIONAL
Brand: C-STEM

## 2024-08-28 DEVICE — EMPHASYS ACETABULAR SHELL MULTI-HOLE 50MM CEMENTLESS
Type: IMPLANTABLE DEVICE | Status: FUNCTIONAL
Brand: EMPHASYS

## 2024-08-28 DEVICE — PINNACLE CANCELLOUS BONE SCREW 6.5MM X 20MM
Type: IMPLANTABLE DEVICE | Site: HIP | Status: FUNCTIONAL
Brand: PINNACLE

## 2024-08-28 DEVICE — PINNACLE CANCELLOUS BONE SCREW 6.5MM X 30MM
Type: IMPLANTABLE DEVICE | Site: HIP | Status: FUNCTIONAL
Brand: PINNACLE

## 2024-08-28 DEVICE — C-STEM AMT CEMENTED STEM HIGH OFFSET SIZE 1 12/14 TAPER
Type: IMPLANTABLE DEVICE | Site: HIP | Status: FUNCTIONAL
Brand: C-STEM

## 2024-08-28 DEVICE — EMPHASYS POLYETHYLENE LINER AOX NEUTRAL 50MM 36MM
Type: IMPLANTABLE DEVICE | Site: HIP | Status: FUNCTIONAL
Brand: EMPHASYS

## 2024-08-28 DEVICE — PINNACLE CANCELLOUS BONE SCREW 6.5MM X 15MM
Type: IMPLANTABLE DEVICE | Site: HIP | Status: FUNCTIONAL
Brand: PINNACLE

## 2024-08-28 DEVICE — TOBRA FULL DOSE ANTIBIOTIC BONE CEMENT, 10 PACK CATALOG NUMBER IS 6197-9-010
Type: IMPLANTABLE DEVICE | Site: HIP | Status: FUNCTIONAL
Brand: SIMPLEX

## 2024-08-28 RX ORDER — PROCHLORPERAZINE EDISYLATE 5 MG/ML
5 INJECTION INTRAMUSCULAR; INTRAVENOUS EVERY 8 HOURS PRN
Status: DISCONTINUED | OUTPATIENT
Start: 2024-08-28 | End: 2024-08-29

## 2024-08-28 RX ORDER — MORPHINE SULFATE 4 MG/ML
2 INJECTION, SOLUTION INTRAMUSCULAR; INTRAVENOUS EVERY 10 MIN PRN
Status: DISCONTINUED | OUTPATIENT
Start: 2024-08-28 | End: 2024-08-28 | Stop reason: HOSPADM

## 2024-08-28 RX ORDER — SODIUM PHOSPHATE, DIBASIC AND SODIUM PHOSPHATE, MONOBASIC 7; 19 G/230ML; G/230ML
1 ENEMA RECTAL ONCE AS NEEDED
Status: DISCONTINUED | OUTPATIENT
Start: 2024-08-28 | End: 2024-08-29

## 2024-08-28 RX ORDER — BISACODYL 10 MG
10 SUPPOSITORY, RECTAL RECTAL
Status: DISCONTINUED | OUTPATIENT
Start: 2024-08-28 | End: 2024-08-29

## 2024-08-28 RX ORDER — SODIUM CHLORIDE, SODIUM LACTATE, POTASSIUM CHLORIDE, CALCIUM CHLORIDE 600; 310; 30; 20 MG/100ML; MG/100ML; MG/100ML; MG/100ML
INJECTION, SOLUTION INTRAVENOUS CONTINUOUS
Status: DISCONTINUED | OUTPATIENT
Start: 2024-08-28 | End: 2024-08-28 | Stop reason: HOSPADM

## 2024-08-28 RX ORDER — PHENYLEPHRINE HCL 10 MG/ML
VIAL (ML) INJECTION AS NEEDED
Status: DISCONTINUED | OUTPATIENT
Start: 2024-08-28 | End: 2024-08-28 | Stop reason: SURG

## 2024-08-28 RX ORDER — MIDAZOLAM HYDROCHLORIDE 1 MG/ML
INJECTION INTRAMUSCULAR; INTRAVENOUS AS NEEDED
Status: DISCONTINUED | OUTPATIENT
Start: 2024-08-28 | End: 2024-08-28 | Stop reason: SURG

## 2024-08-28 RX ORDER — TRAMADOL HYDROCHLORIDE 50 MG/1
50 TABLET ORAL EVERY 6 HOURS SCHEDULED
Status: DISCONTINUED | OUTPATIENT
Start: 2024-08-28 | End: 2024-08-29

## 2024-08-28 RX ORDER — FAMOTIDINE 10 MG/ML
20 INJECTION, SOLUTION INTRAVENOUS 2 TIMES DAILY
Status: DISCONTINUED | OUTPATIENT
Start: 2024-08-28 | End: 2024-08-29

## 2024-08-28 RX ORDER — FAMOTIDINE 20 MG/1
20 TABLET, FILM COATED ORAL 2 TIMES DAILY
Status: DISCONTINUED | OUTPATIENT
Start: 2024-08-28 | End: 2024-08-29

## 2024-08-28 RX ORDER — DOCUSATE SODIUM 100 MG/1
100 CAPSULE, LIQUID FILLED ORAL 2 TIMES DAILY
Status: DISCONTINUED | OUTPATIENT
Start: 2024-08-28 | End: 2024-08-29

## 2024-08-28 RX ORDER — OXYCODONE HYDROCHLORIDE 5 MG/1
2.5 TABLET ORAL EVERY 4 HOURS PRN
Status: DISCONTINUED | OUTPATIENT
Start: 2024-08-28 | End: 2024-08-29

## 2024-08-28 RX ORDER — SODIUM CHLORIDE, SODIUM LACTATE, POTASSIUM CHLORIDE, CALCIUM CHLORIDE 600; 310; 30; 20 MG/100ML; MG/100ML; MG/100ML; MG/100ML
INJECTION, SOLUTION INTRAVENOUS CONTINUOUS
Status: DISCONTINUED | OUTPATIENT
Start: 2024-08-28 | End: 2024-08-29

## 2024-08-28 RX ORDER — DIPHENHYDRAMINE HCL 25 MG
25 CAPSULE ORAL EVERY 4 HOURS PRN
Status: DISCONTINUED | OUTPATIENT
Start: 2024-08-28 | End: 2024-08-29

## 2024-08-28 RX ORDER — HYDROMORPHONE HYDROCHLORIDE 1 MG/ML
0.4 INJECTION, SOLUTION INTRAMUSCULAR; INTRAVENOUS; SUBCUTANEOUS EVERY 2 HOUR PRN
Status: DISCONTINUED | OUTPATIENT
Start: 2024-08-28 | End: 2024-08-29

## 2024-08-28 RX ORDER — NALOXONE HYDROCHLORIDE 0.4 MG/ML
80 INJECTION, SOLUTION INTRAMUSCULAR; INTRAVENOUS; SUBCUTANEOUS AS NEEDED
Status: DISCONTINUED | OUTPATIENT
Start: 2024-08-28 | End: 2024-08-28 | Stop reason: HOSPADM

## 2024-08-28 RX ORDER — DIPHENHYDRAMINE HYDROCHLORIDE 50 MG/ML
12.5 INJECTION INTRAMUSCULAR; INTRAVENOUS EVERY 4 HOURS PRN
Status: DISCONTINUED | OUTPATIENT
Start: 2024-08-28 | End: 2024-08-29

## 2024-08-28 RX ORDER — SODIUM CHLORIDE 9 MG/ML
INJECTION, SOLUTION INTRAVENOUS CONTINUOUS
Status: DISCONTINUED | OUTPATIENT
Start: 2024-08-28 | End: 2024-08-29

## 2024-08-28 RX ORDER — EPHEDRINE SULFATE 50 MG/ML
INJECTION INTRAVENOUS AS NEEDED
Status: DISCONTINUED | OUTPATIENT
Start: 2024-08-28 | End: 2024-08-28 | Stop reason: SURG

## 2024-08-28 RX ORDER — HYDROMORPHONE HYDROCHLORIDE 1 MG/ML
0.6 INJECTION, SOLUTION INTRAMUSCULAR; INTRAVENOUS; SUBCUTANEOUS EVERY 5 MIN PRN
Status: DISCONTINUED | OUTPATIENT
Start: 2024-08-28 | End: 2024-08-28 | Stop reason: HOSPADM

## 2024-08-28 RX ORDER — HYDROMORPHONE HYDROCHLORIDE 1 MG/ML
0.4 INJECTION, SOLUTION INTRAMUSCULAR; INTRAVENOUS; SUBCUTANEOUS EVERY 5 MIN PRN
Status: DISCONTINUED | OUTPATIENT
Start: 2024-08-28 | End: 2024-08-28 | Stop reason: HOSPADM

## 2024-08-28 RX ORDER — ASPIRIN 81 MG/1
81 TABLET ORAL 2 TIMES DAILY
Status: DISCONTINUED | OUTPATIENT
Start: 2024-08-28 | End: 2024-08-29

## 2024-08-28 RX ORDER — PREDNISONE 5 MG/1
10 TABLET ORAL DAILY
Status: DISCONTINUED | OUTPATIENT
Start: 2024-08-29 | End: 2024-08-29

## 2024-08-28 RX ORDER — DEXAMETHASONE SODIUM PHOSPHATE 4 MG/ML
VIAL (ML) INJECTION AS NEEDED
Status: DISCONTINUED | OUTPATIENT
Start: 2024-08-28 | End: 2024-08-28 | Stop reason: SURG

## 2024-08-28 RX ORDER — OXYCODONE HYDROCHLORIDE 5 MG/1
10 TABLET ORAL ONCE
Status: COMPLETED | OUTPATIENT
Start: 2024-08-28 | End: 2024-08-28

## 2024-08-28 RX ORDER — ONDANSETRON 2 MG/ML
4 INJECTION INTRAMUSCULAR; INTRAVENOUS EVERY 6 HOURS PRN
Status: DISCONTINUED | OUTPATIENT
Start: 2024-08-28 | End: 2024-08-29

## 2024-08-28 RX ORDER — HYDROMORPHONE HYDROCHLORIDE 1 MG/ML
0.2 INJECTION, SOLUTION INTRAMUSCULAR; INTRAVENOUS; SUBCUTANEOUS EVERY 2 HOUR PRN
Status: DISCONTINUED | OUTPATIENT
Start: 2024-08-28 | End: 2024-08-29

## 2024-08-28 RX ORDER — ACETAMINOPHEN 500 MG
1000 TABLET ORAL ONCE
Status: COMPLETED | OUTPATIENT
Start: 2024-08-28 | End: 2024-08-28

## 2024-08-28 RX ORDER — POLYETHYLENE GLYCOL 3350 17 G/17G
17 POWDER, FOR SOLUTION ORAL DAILY PRN
Status: DISCONTINUED | OUTPATIENT
Start: 2024-08-28 | End: 2024-08-29

## 2024-08-28 RX ORDER — PREDNISONE 5 MG/1
20 TABLET ORAL DAILY
Status: DISCONTINUED | OUTPATIENT
Start: 2024-08-28 | End: 2024-08-28

## 2024-08-28 RX ORDER — OXYCODONE HYDROCHLORIDE 5 MG/1
5 TABLET ORAL EVERY 4 HOURS PRN
Status: DISCONTINUED | OUTPATIENT
Start: 2024-08-28 | End: 2024-08-29

## 2024-08-28 RX ORDER — DIPHENHYDRAMINE HYDROCHLORIDE 50 MG/ML
25 INJECTION INTRAMUSCULAR; INTRAVENOUS ONCE AS NEEDED
Status: ACTIVE | OUTPATIENT
Start: 2024-08-28 | End: 2024-08-28

## 2024-08-28 RX ORDER — MORPHINE SULFATE 4 MG/ML
4 INJECTION, SOLUTION INTRAMUSCULAR; INTRAVENOUS EVERY 10 MIN PRN
Status: DISCONTINUED | OUTPATIENT
Start: 2024-08-28 | End: 2024-08-28 | Stop reason: HOSPADM

## 2024-08-28 RX ORDER — HYDROMORPHONE HYDROCHLORIDE 1 MG/ML
0.2 INJECTION, SOLUTION INTRAMUSCULAR; INTRAVENOUS; SUBCUTANEOUS EVERY 5 MIN PRN
Status: DISCONTINUED | OUTPATIENT
Start: 2024-08-28 | End: 2024-08-28 | Stop reason: HOSPADM

## 2024-08-28 RX ORDER — URSODIOL 300 MG/1
600 CAPSULE ORAL 2 TIMES DAILY
Status: DISCONTINUED | OUTPATIENT
Start: 2024-08-28 | End: 2024-08-29

## 2024-08-28 RX ORDER — MYCOPHENOLATE MOFETIL 250 MG/1
500 CAPSULE ORAL 2 TIMES DAILY
Status: DISCONTINUED | OUTPATIENT
Start: 2024-08-28 | End: 2024-08-29

## 2024-08-28 RX ORDER — TRANEXAMIC ACID 10 MG/ML
INJECTION, SOLUTION INTRAVENOUS AS NEEDED
Status: DISCONTINUED | OUTPATIENT
Start: 2024-08-28 | End: 2024-08-28 | Stop reason: SURG

## 2024-08-28 RX ORDER — KETOROLAC TROMETHAMINE 15 MG/ML
15 INJECTION, SOLUTION INTRAMUSCULAR; INTRAVENOUS EVERY 6 HOURS
Status: DISCONTINUED | OUTPATIENT
Start: 2024-08-28 | End: 2024-08-29

## 2024-08-28 RX ORDER — MORPHINE SULFATE 10 MG/ML
6 INJECTION, SOLUTION INTRAMUSCULAR; INTRAVENOUS EVERY 10 MIN PRN
Status: DISCONTINUED | OUTPATIENT
Start: 2024-08-28 | End: 2024-08-28 | Stop reason: HOSPADM

## 2024-08-28 RX ORDER — SENNOSIDES 8.6 MG
17.2 TABLET ORAL NIGHTLY
Status: DISCONTINUED | OUTPATIENT
Start: 2024-08-28 | End: 2024-08-29

## 2024-08-28 RX ADMIN — EPHEDRINE SULFATE 5 MG: 50 INJECTION INTRAVENOUS at 15:48:00

## 2024-08-28 RX ADMIN — SODIUM CHLORIDE, SODIUM LACTATE, POTASSIUM CHLORIDE, CALCIUM CHLORIDE: 600; 310; 30; 20 INJECTION, SOLUTION INTRAVENOUS at 14:02:00

## 2024-08-28 RX ADMIN — MIDAZOLAM HYDROCHLORIDE 2 MG: 1 INJECTION INTRAMUSCULAR; INTRAVENOUS at 14:20:00

## 2024-08-28 RX ADMIN — PHENYLEPHRINE HCL 100 MCG: 10 MG/ML VIAL (ML) INJECTION at 15:38:00

## 2024-08-28 RX ADMIN — EPHEDRINE SULFATE 5 MG: 50 INJECTION INTRAVENOUS at 15:38:00

## 2024-08-28 RX ADMIN — DEXAMETHASONE SODIUM PHOSPHATE 4 MG: 4 MG/ML VIAL (ML) INJECTION at 14:39:00

## 2024-08-28 RX ADMIN — TRANEXAMIC ACID 1000 MG: 10 INJECTION, SOLUTION INTRAVENOUS at 14:37:00

## 2024-08-28 RX ADMIN — TRANEXAMIC ACID 1000 MG: 10 INJECTION, SOLUTION INTRAVENOUS at 15:43:00

## 2024-08-28 RX ADMIN — PHENYLEPHRINE HCL 100 MCG: 10 MG/ML VIAL (ML) INJECTION at 15:28:00

## 2024-08-28 RX ADMIN — PHENYLEPHRINE HCL 100 MCG: 10 MG/ML VIAL (ML) INJECTION at 15:20:00

## 2024-08-28 RX ADMIN — PHENYLEPHRINE HCL 100 MCG: 10 MG/ML VIAL (ML) INJECTION at 14:55:00

## 2024-08-28 NOTE — ANESTHESIA POSTPROCEDURE EVALUATION
Patient: Cammie Nunn    Procedure Summary       Date: 08/28/24 Room / Location: [facility]  / [facility]    Anesthesia Start: 1414 Anesthesia Stop:     Procedure: Left anterior total hip arthroplasty (Left: Hip) Diagnosis:       Closed nondisplaced basicervical fracture of left femur, initial encounter ([facility])      (Left Femoral Neck Fracture)    Surgeons: Behery, Omar Atef, MD Anesthesiologist: Romina Rhoades MD    Anesthesia Type: spinal, MAC ASA Status: 4            Anesthesia Type: spinal, MAC    Vitals Value Taken Time   /62 08/28/24 1637   Temp 98.3 °F (36.8 °C) 08/28/24 1637   Pulse 80 08/28/24 1637   Resp 14 08/28/24 1637   SpO2 100 % 08/28/24 1637   Vitals shown include unfiled device data.    EM AN Post Evaluation:   Patient Evaluated in PACU  Patient Participation: complete - patient participated  Level of Consciousness: sleepy but conscious  Pain Score: 2  Pain Management: adequate  Airway Patency:patent  Dental exam unchanged from preop  Yes    Nausea/Vomiting: none  Cardiovascular Status: blood pressure returned to baseline  Respiratory Status: nasal cannula  Postoperative Hydration acceptable      Marianne Virk CRNA  8/28/2024 4:38 PM

## 2024-08-28 NOTE — INTERVAL H&P NOTE
Pre-op Diagnosis: Left Femoral Neck Fracture    The above referenced H&P was reviewed by Lavell Arriaga PA-C on 8/28/2024, the patient was examined and no significant changes have occurred in the patient's condition since the H&P was performed.  I discussed with the patient and/or legal representative the potential benefits, risks and side effects of this procedure; the likelihood of the patient achieving goals; and potential problems that might occur during recuperation.  I discussed reasonable alternatives to the procedure, including risks, benefits and side effects related to the alternatives and risks related to not receiving this procedure.  We will proceed with procedure as planned.

## 2024-08-28 NOTE — ANESTHESIA PROCEDURE NOTES
Airway  Date/Time: 8/28/2024 2:25 PM  Urgency: Elective    Airway not difficult    General Information and Staff    Patient location during procedure: OR  Anesthesiologist: Romina Rhoaeds MD  Performed: anesthesiologist   Performed by: Romina Rhoades MD  Authorized by: Romina Rhoades MD      Indications and Patient Condition  Indications for airway management: anesthesia  Spontaneous Ventilation: absent  Sedation level: deep  Preoxygenated: yes  Patient position: sniffing  Mask difficulty assessment: 1 - vent by mask    Final Airway Details  Final airway type: endotracheal airway      Successful airway: ETT  Cuffed: yes   Successful intubation technique: direct laryngoscopy  Facilitating devices/methods: intubating stylet  Endotracheal tube insertion site: oral  Blade: GlideScope  Blade size: #3  ETT size (mm): 7.0    Cormack-Lehane Classification: grade I - full view of glottis  Placement verified by: capnometry   Measured from: teeth  ETT to teeth (cm): 21  Number of attempts at approach: 1  Ventilation between attempts: none  Number of other approaches attempted: 0    Additional Comments  Larry # 3

## 2024-08-28 NOTE — DISCHARGE INSTRUCTIONS
DISCHARGE INSTRUCTIONS:  PLACE ICE TO SURGICAL AREA 20-30 MINUTES ON/ 20-30 MINUTES OFF. THIS IS TO HELP WITH PAIN & SWELLING.    TAKE YOUR MEDICATIONS AS PRESCRIBED BY YOUR DOCTOR BELOW.THESE HAVE BEEN SENT TO YOUR PHARMACY.    IF YOU WERE INSTRUCTED BY PHYSICAL THERAPY TO EXERCISE HIP PRECAUTIONS, CONTINUE TO DO SO AFTER DISCHARGE    IT IS OK TO BEAR WEIGHT AS TOLERATED ON THE OPERATIVE EXTREMITY.     CALL TO CONFIRM YOUR FOLLOW UP APPOINTMENT WITH DR. BEHERY TO BE SEEN IN 2-3 WEEKS POSTOP. [phone]    MEDICATIONS    POST OP MEDICATION REGIMEN              MULTI-MODAL   PAIN REGIMEN     DRUG   FOR   FREQUENCY & DURATION   QTY   NOTES     WEANING  TIPS       Gabapentin 100mg   Nerve pain   Take 2 tabs every 8 hours for 14 days    90   No refills You may discontinue this medication prior to 14 days if you do not tolerate it.                 Tramadol 50mg     Moderate pain   Take 1-2 tabs every 6 hours only as needed   45 May prescribe a refill, but ideally, this should be tapered off after surgery Stop using this medication 2nd   As pain decreases over time, increase the interval between doses (6 hours to 8, then 12, then 24) to taper off the medication.                    BREAKTHROUGH PAIN         Oxycodone 5mg       Severe pain     Take 1-2 tabs every 4-6 hours only as needed for severe pain       40   Take at least 1 hr apart from Tramadol.    Ideally, no refill. The goal is to taper off this medication as soon as possible, as it can be addictive and have side effects.    Stop using this medication 1st if no longer having severe pain.         BLOOD THINNER     Aspirin 81mg   Blood clot prevention   Take 1 tab twice daily for 30 days     60     No refills   Complete the entire course of your blood thinner.         ANTIBIOTIC       Cefadroxil 500mg       Infection prevention     Take 1 tab twice daily for 7 days     14     No refills   Complete the entire course of your prophylactic antibiotic.           STOOL  SOFTENER     Sennokot 8.6/50mg   Constipation   Take 1 tab twice daily  while on opioids     60 Refer to discharge instructions if constipation persists even after taking this medication   Stop taking if having diarrhea or loose stools.           ANTI-NAUSEA   Ondansetron 4mg   Nausea   Take 1 tab every 8 hours as needed if nauseous     20   No Refill      ANTI-ACID REFLUX / GASTRITIS   Pantoprazole 40mg   Acid reflux, gastric ulcer prophylaxis   Take 1 tab every day along with Meloxicam     60   May refill if Meloxicam refilled        DRESSING:    YOU HAVE A SPECIAL DRESSING CALLED AN AQUACEL DRESSING OVER YOUR INCISION.    THE DRESSING STAYS FOR 12 DAYS FROM SURGERY.    YOU MAY SHOWER AS SOON AS YOU RETURN HOME WITH THE AQUACEL DRESSING INTACT OVER YOUR INCISION AREA.    IF THE DRESSING LEAKS OR COMES LOOSE BEFORE THE 7 DAY PERIOD CONTACT YOUR DOCTOR / SURGEON.    AFTER   12 DAYS THE DRESSING IS REMOVED BY PULLING ON THE EDGE OF THE DRESSING AND GENTLY STRETCHING IT, THEN PEEL IT OFF SLOWLY LIKE A BANDAID. IF YOU HAVE ANY QUESTIONS OR CONCERNS ABOUT THE DRESSING CONTACT YOUR DOCTOR.    IF DRAINAGE IS PRESENT AT ANYTIME FROM YOUR DRESSING OR FROM YOUR INCISION CALL YOUR DOCTOR / SURGEON AS SOON AS POSSIBLE.      REASONS TO CALL YOUR DOCTOR:  TEMPERATURE .0 OR MORE  PERSISTANT NAUSEA OR VOMITTING - ESPECIALLY IF YOU ARE UNABLE TO EAT OR DRINK.  INCREASED LEVEL OF PAIN OR PAIN WHICH IS NOT CONTROLLED BY YOUR PAIN MEDICINE.  PERSISTENT DRAINAGE AT ANYTIME FROM YOUR SURGICAL AREA / INCISION.  PAIN, TENDERNESS OR SUDDEN SWELLING IN YOUR CALF REGION OF THE LOWER EXTREMITIES - THIS IS A POSSIBLE SIGN OF A BLOOD CLOT.  ANY CHANGES IN SENSATION IN YOUR BODY IN THE AREA OF YOUR SURGERY = NUMBNESS OR TINGLING.  ANY CHANGES IN CIRCULATION IN YOUR BODY IN THE AREA OF YOUR SURGERY = CHANGES  IN COLOR EITHER DARKER OR VERY PALE; OR  IF AREA FEELS COLD TO THE TOUCH AS COMPARED TO OTHER AREAS.  ANY CHANGES IN FUNCTION IN YOUR  BODY IN THE AREA OF YOUR SURGERY = CHANGE IN MOVEMENT - UNABLE TO MOVE OR WIGGLE FINGERS, TOES OR FOOT OR ANY OTHER CHANGES IN NORMAL BODY FUNCTIONING.  FOR ANY OF THE ABOVE OR ANY OTHER QUESTIONS OR CONCERNS CALL YOUR DOCTOR/ SURGEON AS SOON AS POSSIBLE.      Omar Behery, MD MPH  Orthopaedic Surgeon, Adult Hip and Knee Reconstruction  [facility] at [address], [address] [address]  Office: (P) [phone] (F) [phone]  Adminstrative Assistant: Arlet Tinsley

## 2024-08-28 NOTE — OPERATIVE REPORT
[facility]  OPERATIVE REPORT    DATE OF SURGERY: 8/28/2024    PREOPERATIVE DIAGNOSIS:   Left Chronic Femoral Neck Fracture     POST-OPERATIVE DIAGNOSIS:   Left Chronic Femoral Neck Fracture     PROCEDURE:  Left Hybrid Total Hip Arthroplasty  Intra-operative Interpretation of Fluoroscopy    Location: [facility]    SURGEON: Omar A Behery, MD  Assistant(s): Lavell GREENE    Anesthesia type:  General  Estimated Blood Loss: 450cc    Drains: None    Complications: None immediately apparent    Specimen: Femoral head    Findings: Consistent with advanced degenerative joint disease, and chronic femoral neck fracture    Implants:  Acetabular Component: Depuy Emphasys, size 50mm shell, 36m neutral AOX Polyethylene Liner,Three x 6.5mm dome screws  Femoral Component: Cemented size 1, high offset, Depuy C-stem femoral component  Head: 36mm + 1.5 delta ceramic head.     Indication:     This is a 54 year old lady, who presented with chronic hip pain refractory to non-operative treatment, and impairing activities of daily living. Radiographic evaluation demonstrated chronic femoral neck fracture with associated advanced degenerative hip osteoarthritis . Surgical versus non-surgical options were discussed with the patient, and together we decided it is best to proceed with a total hip arthroplasty with the goals of pain relief and improvement in function. She was evaluated by medicine preoperatively and was deemed high risk for surgical intervention given liver cirrhosis and autoimmune hepatitis with treatment noncompliance. All risks and benefits of surgery including bleeding, infection, dislocation, michael-prosthetic fracture, neurovascular injury, leg length or offset discrepancy, as well as medical and anesthesia-related complications were discussed with the patient / family, who elected to proceed with surgery.     Procedure in detail:    The patient was brought to the operating room, and underwent  the above anesthesia.     The patient was placed in a supine position with both feet secured in boots on the Narka table.  The operative hip was sterilely prepped and draped in a usual orthopedic fashion.      A surgical pause was conducted to reconfirm the correct patient, site, side, and procedure to be performed.  Perioperative antibiotics were given within one hour prior to the procedure.      An approximately 8cm long incision was made beginning proximally 2 cm lateral and 2 cm distal to the anterior-superior iliac spine and extending distally toward the ipsilateral lateral epicondyle of the knee.  Electrocautery was used to dissect through the subcutaneous tissue. The fascia overlying the TFL was incised in line with its fibers.  A Mary elevator was used to separate the fascia from the TFL.  A finger then was placed along the superior femoral neck, and a cobra retractor was placed.  A cerebellar retractor was placed distally between the rectus and the tensor muscle.     Using electrocautery and a Schnidt tonsil clamp, the ascending branches of the lateral circumflex artery were ligated.  The investing fascia over the capsule was divided, and another cobra retractor was placed inferior to the femoral neck.  The pericapsular fat was then excised from around the anterior hip capsule.     The anterior hip capsule was then incised with electrocautery starting at the edge of the acetabulum in line with the anterosuperior edge of the femoral neck and extending distally to the anterior intertrochanteric line, dividing the capsule at about a 135-degree angle. The medial and lateral capsule flaps were elevated off the of the proximal femur intertrochanteric line, and No. 5 Ethibond tagging sutures were placed in the both flaps of the capsule. A Hohmann retractor was placed posterosuperiorly over the acetabulum and the capsule was elevated a few millimeters off of the acetabular rim and ilium. The hip was externally  rotated to 90 degrees, allowing an inferomedial split of the capsule. The hip was rotated back and a double pronged retractor was placed inferomedially instead of the cobra.     The superior cobra was placed inside the capsule. A jarrell was made on the femoral neck in line with the planned femoral neck resection.  An oscillating saw was used to resect below the subcapital fracture line. .  The hip was externally rotated to 70 degrees, and traction was applied. A spiral femoral hip screw was placed in the femoral head, and the femoral head was removed from the wound.      A No. 1 retractor over the anterior wall of the acetabulum and the No. 2 retractor was placed postero-superiorly.  This exposed the acetabulum. We then used electrocautery to excise the labrum and pulvinar. Fluoroscopy was utilized to obtain a perfect AP pelvis radiograph.  We then started reaming and sequentially expanded the socket. The last reamer was a 50-mm reamer.  Once appropriate reaming and positioning of the cup was determined with fluoroscopy, we then placed a final 50 mm  multhole acetabular shell in the appropriate degree of anteversion and abduction.  There was an excellent press fit.  Supplemental 6.5mm cancellous bone screws were drilled, measured and placed.  The final  36 mm neutral polyethylene acetabular liner was impacted into place without difficulty. The liner was checked and noted to be well seated.     Traction was released, and then we turned our attention to the femur. The femoral hook from the Abrams bed was placed around the vastus ridge.  The leg was externally rotated 120 degrees. An asymmetric double pronged retractor was placed distally medially over the calcar, and the leg was then extended and adducted. A #1 retractor was placed posteriorly between the superolateral capsule and the gluteus minimus. The lateral capsule was then released off the femur, allowing for improved elevation of the femur. The conjoined and  piriformis tendons were not released. The hook was elevated, a double pronged retractor was placed over the greater trochanter and appropriate exposure of the femur was achieved.     A box osteotome was used to identify the starting location. A rongeur was used to remove posterolateral cortical bone to allow neutral broaching.  The femoral canal was then broached sequentially to a size 1 cemented stem.  The broach was left in place, and a high offset neck with a 36-mm +1.5 trial head were placed.  The leg was abducted and raised, and the hip was then reduced. Fluoroscopic imaging demonstrated appropriate leg length and offset.  The hip was found to have appropriate soft tissue tension.  Hip range of motion was tested and noted to be stable throughout.    We then elected to implant these components.  The hip was re-dislocated.  The trial components were removed. The femur was exposed again, and the femoral canal was irrigated and suctioned. An appropriately-sized cement restrictor was placed at the appropriate depth.   A size 1, high offset femoral component was cemented into place with 3 batches of simplex tobramycin cement, using vacuum mixing.  Correct version was ensured throughout the entire process. A 36 mm + 1.5 femoral head was retrialed with appropriate soft tissue tension.  The final 36 mm + 1.5  femoral head was then impacted onto a cleaned/dried trunnion.  The hip was then reduced.     The wound was thoroughly irrigated with dilute betadine solution followed by normal saline. Pain cocktail injection was delivered to the soft tissues. The capsule was closed with No. 1 Vicryl interrupted sutures. The fascia over the TFL was closed with a running Quill suture.  The subcutaneous tissues were then closed with 2-0 vicryl.  The skin was closed with a running 3-0 monocryl suture.  Dermabond was applied to the skin edges and a dry sterile dressing was placed.  The patient was awakened from anesthesia and taken to  the PACU in stable condition.  There were no immediate complications.    The PA assistant was necessary for help with positioning, draping, retraction, and wound closure.     POST-OPERATIVE PLAN  The patient will be permitted weight bearing as tolerated on the operative extremity  The patient will be permitted range of motion as tolerated  The patient will receive 24 hours of perioperative antibiotics and 1 week of oral extended prophylaxis  Venous thromboembolic prophylaxis will consist of early mobilization, mechanical compressive devices, and ASA 81 BID.    The dressing may be removed at 12 days post-operatively  The patient should be scheduled for follow up in 2 weeks for wound and radiographic evaluation.

## 2024-08-29 VITALS
TEMPERATURE: 98 F | SYSTOLIC BLOOD PRESSURE: 109 MMHG | RESPIRATION RATE: 16 BRPM | OXYGEN SATURATION: 100 % | HEART RATE: 77 BPM | DIASTOLIC BLOOD PRESSURE: 58 MMHG | BODY MASS INDEX: 28.85 KG/M2 | HEIGHT: 64 IN | WEIGHT: 169 LBS

## 2024-08-29 LAB
ALBUMIN SERPL-MCNC: 2.8 G/DL (ref 3.2–4.8)
ALBUMIN/GLOB SERPL: 0.9 {RATIO} (ref 1–2)
ALP LIVER SERPL-CCNC: 208 U/L
ALT SERPL-CCNC: 84 U/L
ANION GAP SERPL CALC-SCNC: 10 MMOL/L (ref 0–18)
AST SERPL-CCNC: 139 U/L (ref ?–34)
BILIRUB SERPL-MCNC: 21.5 MG/DL (ref 0.3–1.2)
BUN BLD-MCNC: 15 MG/DL (ref 9–23)
BUN/CREAT SERPL: 14.3 (ref 10–20)
CALCIUM BLD-MCNC: 8.3 MG/DL (ref 8.7–10.4)
CHLORIDE SERPL-SCNC: 106 MMOL/L (ref 98–112)
CO2 SERPL-SCNC: 21 MMOL/L (ref 21–32)
CREAT BLD-MCNC: 1.05 MG/DL
DEPRECATED RDW RBC AUTO: 60.8 FL (ref 35.1–46.3)
EGFRCR SERPLBLD CKD-EPI 2021: 62 ML/MIN/1.73M2 (ref 60–?)
ERYTHROCYTE [DISTWIDTH] IN BLOOD BY AUTOMATED COUNT: 17.2 % (ref 11–15)
GLOBULIN PLAS-MCNC: 3 G/DL (ref 2–3.5)
GLUCOSE BLD-MCNC: 127 MG/DL (ref 70–99)
HCT VFR BLD AUTO: 26.5 %
HGB BLD-MCNC: 8.8 G/DL
INR BLD: 1.32 (ref 0.8–1.2)
MCH RBC QN AUTO: 32.1 PG (ref 26–34)
MCHC RBC AUTO-ENTMCNC: 33.2 G/DL (ref 31–37)
MCV RBC AUTO: 96.7 FL
OSMOLALITY SERPL CALC.SUM OF ELEC: 286 MOSM/KG (ref 275–295)
PLATELET # BLD AUTO: 144 10(3)UL (ref 150–450)
POTASSIUM SERPL-SCNC: 4 MMOL/L (ref 3.5–5.1)
PROT SERPL-MCNC: 5.8 G/DL (ref 5.7–8.2)
PROTHROMBIN TIME: 17.2 SECONDS (ref 11.6–14.8)
RBC # BLD AUTO: 2.74 X10(6)UL
SODIUM SERPL-SCNC: 137 MMOL/L (ref 136–145)
WBC # BLD AUTO: 14 X10(3) UL (ref 4–11)

## 2024-08-29 PROCEDURE — 97530 THERAPEUTIC ACTIVITIES: CPT

## 2024-08-29 PROCEDURE — 80053 COMPREHEN METABOLIC PANEL: CPT | Performed by: FAMILY MEDICINE

## 2024-08-29 PROCEDURE — 85027 COMPLETE CBC AUTOMATED: CPT | Performed by: STUDENT IN AN ORGANIZED HEALTH CARE EDUCATION/TRAINING PROGRAM

## 2024-08-29 PROCEDURE — 97535 SELF CARE MNGMENT TRAINING: CPT

## 2024-08-29 PROCEDURE — 97165 OT EVAL LOW COMPLEX 30 MIN: CPT

## 2024-08-29 PROCEDURE — 97162 PT EVAL MOD COMPLEX 30 MIN: CPT

## 2024-08-29 PROCEDURE — 85610 PROTHROMBIN TIME: CPT | Performed by: FAMILY MEDICINE

## 2024-08-29 RX ORDER — PREDNISONE 10 MG/1
10 TABLET ORAL DAILY
Status: SHIPPED | COMMUNITY
Start: 2024-08-29

## 2024-08-29 RX ORDER — ASPIRIN 81 MG/1
81 TABLET ORAL 2 TIMES DAILY
Qty: 60 TABLET | Refills: 0 | Status: SHIPPED | COMMUNITY
Start: 2024-08-29

## 2024-08-29 NOTE — OCCUPATIONAL THERAPY NOTE
OCCUPATIONAL THERAPY EVALUATION - INPATIENT     Room Number: Room 2/Room 2-A  Evaluation Date: 2024  Type of Evaluation: Quick Eval  Presenting Problem: s/p L MALCOLM    Physician Order: IP Consult to Occupational Therapy  Reason for Therapy: ADL/IADL Dysfunction and Discharge Planning    OCCUPATIONAL THERAPY ASSESSMENT   Patient is a 54 year old female admitted 2024 for s/p L MALCOLM.  Prior to admission, patient's baseline is independent with ADLs and functional mobility with use of crutches.  Patient is currently functioning near baseline with ADLs and functional mobility.    PLAN  Patient has been evaluated and presents with no skilled Occupational Therapy needs  at this time.  Patient will be discharged from Occupational Therapy services. Please re-order if a new functional limitation presents during this admission.         OCCUPATIONAL THERAPY MEDICAL/SOCIAL HISTORY   Problem List  Principal Problem:    Arthritis of right hip  Active Problems:    Mirizzi's syndrome (HCC)    Primary hypertension    Primary biliary cholangitis (HCC)    Autoimmune hepatitis (HCC)    Elevated INR    Hyperbilirubinemia    Status post right hip replacement    Current chronic use of systemic steroids    HOME SITUATION  Type of Home: House  Home Layout: Two level  Lives With: Son  Toilet and Equipment: Standard height toilet  Shower/Tub and Equipment: Tub-shower combo  Patient Regularly Uses: Glasses      Use of Assistive Device(s): Crutches for mobility    Prior Level of [place]: independent with ADLs    SUBJECTIVE  \"I have less pain than before the surgery\"     OCCUPATIONAL THERAPY EXAMINATION    OBJECTIVE  Fall Risk: High fall risk    WEIGHT BEARING RESTRICTION  L Lower Extremity: Weight Bearing as Tolerated    PAIN ASSESSMENT  Ratin      RANGE OF MOTION   Upper extremity ROM is within functional limits     STRENGTH ASSESSMENT  Upper extremity strength is within functional limits         ACTIVITIES OF DAILY LIVING  ASSESSMENT  AM-PAC ‘6-Clicks’ Inpatient Daily Activity Short Form  How much help from another person does the patient currently need…  -   Putting on and taking off regular lower body clothing?: A Little  -   Bathing (including washing, rinsing, drying)?: A Little  -   Toileting, which includes using toilet, bedpan or urinal? : A Little  -   Putting on and taking off regular upper body clothing?: None  -   Taking care of personal grooming such as brushing teeth?: None  -   Eating meals?: None    AM-PAC Score:  Score: 21  Approx Degree of Impairment: 32.79%  Standardized Score (AM-PAC Scale): 44.27  CMS Modifier (G-Code): CJ    FUNCTIONAL TRANSFER ASSESSMENT  Sit to Stand: Edge of Bed  Edge of Bed: Supervision  Toilet Transfer: Supervision    BED MOBILITY  Supine to Sit : Supervision    BALANCE ASSESSMENT  Static Sitting: Supervision    FUNCTIONAL ADL ASSESSMENT  Grooming Standing: Supervision  UB Dressing Seated: Modified Independent  UB Dressing Standing: Supervision  Toileting Seated: Supervision       Skilled Therapy Provided:   Pt received laying in bed and agreeable to therapy. Provided pt education on activity modification / compensatory strategies for pain management s/p LTHA, WB restrictions, and appropriate hip ROM. Pt verbalized understanding to all education. Pt able to complete supine to sitting EOB with SUP and no complaints of pain. Pt instructed to don underwear to simulate LB dressing at home and pt able to thread BLE and stand to pull over hips with SUP. Pt instructed to complete toileting task. Pt ambulated to bathroom with SUP using RW and completed toilet transfer. Pt stating no increased pain with activity. Pt instructed to wash hands standing at sink to simulate grooming tasks at home. Pt requiring SUP due to decreased strength. Pt seated EOB with PT taking over session. RN notified of session and findings.     EDUCATION PROVIDED  Patient: Role of Occupational Therapy; Plan of Care; Functional  Transfer Techniques; Weight Bear Status; Compensatory ADL Techniques  Patient's Response to Education: Verbalized Understanding; Returned Demonstration    The patient's Approx Degree of Impairment: 32.79% has been calculated based on documentation in the [facility] '6 clicks' Inpatient Daily Activity Short Form.  Research supports that patients with this level of impairment may benefit from home.  Final disposition will be made by interdisciplinary medical team.    Patient End of Session: Other (Comment);RN aware of session/findings (Sitting EOB with PT taking over session)      Patient Evaluation Complexity Level:   Occupational Profile/Medical History LOW - Brief history including review of medical or therapy records    Specific performance deficits impacting engagement in ADL/IADL LOW  1 - 3 performance deficits    Client Assessment/Performance Deficits LOW - No comorbidities nor modifications of tasks    Clinical Decision Making LOW - Analysis of occupational profile, problem-focused assessments, limited treatment options    Overall Complexity LOW     OT Session Time: 15 minutes  Self-Care Home Management: 15 minutes

## 2024-08-29 NOTE — PROGRESS NOTES
[facility]  part of [facility]    Progress Note    Cammie Nunn Patient Status:  Outpatient in a Bed    1970 MRN [id number]   Location [facility] Attending Behery, Omar Atef, MD   Hosp Day # 0 PCP Endy Solorzano MD       Subjective:   Cammie Nunn is a(n) 54 year old female POD# 0 Feels she is doing well. No chest pain, dyspnea or nausea.     Objective:   Vital Signs:  Blood pressure 109/60, pulse 63, temperature 97.5 °F (36.4 °C), temperature source Oral, resp. rate 18, height 5' 4\" (1.626 m), weight 169 lb (76.7 kg), last menstrual period 10/17/2018, SpO2 100%.     General: No acute distress. Alert and oriented x 3.  HEENT: Moist mucous membranes. EOM-I. PERRL  Respiratory: Clear to auscultation bilaterally.  No wheezes. No rhonchi.  Cardiovascular: S1, S2.  Regular rate and rhythm.  No murmurs.  Abdomen: Soft, nontender, nondistended.   Neurologic: No focal neurological deficits.  Musculoskeletal: No calf tenderness.   Integument: No lesions. No erythema.  Psychiatric: Appropriate mood and affect.      Results:    Lab Results   Component Value Date    WBC 10.5 2024    HGB 9.2 (L) 2024    HCT 28.7 (L) 2024    .0 2024    CREATSERUM 0.79 2024    BUN 12 2024     2024    K 3.9 2024     2024    CO2 23.0 2024     (H) 2024    CA 8.7 2024    ALB 3.5 2024    ALKPHO 285 (H) 2024    BILT 18.7 (H) 2024    TP 7.1 2024     (H) 2024     (H) 2024    PTT 33.1 2024    INR 1.28 (H) 2024    LIP 96 2022    MG 1.9 2022    PHOS 3.6 2022         XR FLUOROSCOPY C-ARM TIME LESS THAN 1 HOUR (CPT=76000)    Result Date: 2024  CONCLUSION: Intraoperative fluoroscopy provided.  Please see surgical note for specific details.     Dictated by (CST): Fco Flowers MD on 2024 at 6:58 PM     Finalized by  (CST): Fco Flowers MD on 8/28/2024 at 6:58 PM          XR PELVIS (1 VIEW) (CPT=72170)    Result Date: 8/28/2024  CONCLUSION: Status post total left hip arthroplasty.    Dictated by (CST): Fco Flowers MD on 8/28/2024 at 5:25 PM     Finalized by (CST): Fco Flowers MD on 8/28/2024 at 5:26 PM               Assessment and Plan:       Arthritis of right hip  Now replaced      Mirizzi's syndrome (HCC)        Status post right hip replacement  Stable VS, Hg 9+      Primary hypertension        Primary biliary cholangitis (HCC)        Autoimmune hepatitis (HCC)        Hyperbilirubinemia        Current chronic use of systemic steroids        Elevated INR    1.28 today            Postoperative Recommendations:  Anticoagulation / DVT prophylaxis: SCDs, early ambulation. ASA 81 mg twice daily with food for four weeks.    Pain Control: per ortho  GI protection: Protonix  Incentive Spirometry   Telemetry as needed  CPAP/O2 as needed       Pain management and Physical therapy as per Orthopedic service.   Home Health as needed          Endy Solorzano MD  8/28/2024

## 2024-08-29 NOTE — PROGRESS NOTES
[facility]     ORTHO DAILY PROGRESS NOTE    Patient: Cammie Nunn      Subjective:    Nothing acute overnight.  Pain in the operative extremity is well controlled.    Patient denies new onset numbness/tingling in the operative extremity.    Patient also denies chest pain / shortness of breath, nausea/vomiting.          Objective:      Vitals:    08/29/24 0432   BP: 106/60   Pulse: 64   Resp: 18   Temp: 97.9 °F (36.6 °C)       I/O last 3 completed shifts:  In: 1260 [I.V.:1150; IV PIGGYBACK:110]  Out: 1050 [Urine:600; Blood:450]     Gen: awake, alert, not in acute distress    Abdomen nondistended, non-tender      Left Lower Extremity:   Dressing clean, dry, intact.    Sensation intact to light touch in Sural/Saphenous/Tibial/Superficial Peroneal/Deep Peroneal and Tibial distributions.   Motor exam : 5/5 EHL/FHL/TA/GSC.  Vascular exam: Palpable dorsalis pedis pulses. Cap refill ~2s in the toes. Foot warm and well perfused      Labs:      Lab Results   Component Value Date    HGB 8.8 (L) 08/29/2024    HGB 9.2 (L) 08/28/2024    HGB 10.8 (L) 08/28/2024         Lab Results   Component Value Date    INR 1.32 (H) 08/29/2024    INR 1.28 (H) 08/28/2024    INR 1.49 (H) 08/14/2024           ASSESSMENT/PLAN: 54 year old yo female s/p left total hip arthroplasty for chronic femoral neck fx 8/28/2024   - appreciate hospitalist co management  - Weight bearing status: WBAT LLE  - no hip precautions  - Mechanical DVT prophylaxis and chemoprophylaxis with ASA 81 BID   - 24 hours of perioperative antibiotics  - PT for mobilization and motion  - Advance diet as tolerated. Discontinue IV fluids POD1 if tolerating diet.   - Multimodal pain control regimen ordered  - Disposition: Pending PT Evaluation. Home with outpatient PT at home.     Omar Behery, MD  [facility]

## 2024-08-29 NOTE — CM/SW NOTE
SW received MDO for surgery/post surgical.    Pt is outpt in a bed status on 1E.      Pt has Outpatient in home PT ([facility]) arranged pre operatively by Dr. Behery's' office.       / to remain available for support and/or discharge planning.      Arlet HERNANDEZ MSW, LSW  Social Work/Case Management

## 2024-08-29 NOTE — PHYSICAL THERAPY NOTE
PHYSICAL THERAPY HIP EVALUATION - INPATIENT     Room Number: Room 2/Room 2-A  Evaluation Date: 8/29/2024  Type of Evaluation: Initial  Physician Order: PT Eval and Treat    Presenting Problem: s/p Anterior Left  MALCOLM . No precautions noted in chart . Pt is WBAT for left LE .     PMH : Mirizzi's syndrome (HCC)    Primary hypertension  Primary biliary cholangitis (HCC)   Autoimmune hepatitis (HCC)  Hyperbilirubinemia/Current chronic use of systemic steroids     Reason for Therapy: Mobility Dysfunction and Discharge Planning    PHYSICAL THERAPY ASSESSMENT   Patient is a 54 year old female admitted 8/28/2024 for Presenting Problem: s/p Anterior Left  MALCOLM . No precautions noted in chart . Pt is WBAT for left LE ..  Prior to admission, patient's baseline is Indep ADL and community mobility  with use of B crutches at all times. Pt denies hx of falls able to drive .  Patient is currently functioning below baseline with bed mobility, transfers, gait, stair negotiation, standing prolonged periods, and performing household tasks.  Patient is requiring  CGA to brief min assist for bed mobility as needed  as a result of the following impairments: decreased functional strength, decreased endurance/aerobic capacity, impaired standing balance, medical status, and limited left hip  ROM.  Physical Therapy will continue to follow for duration of hospitalization.    Patient will benefit from continued skilled PT Services at discharge to promote prior level of function and safety with additional support and return home with home health PT.    PLAN  PT Treatment Plan: Bed mobility;Body mechanics;Endurance;Energy conservation;Patient education;Family education;Gait training;Balance training;Transfer training;Stair training;Stoop training  Rehab Potential : Good  Frequency (Obs): Daily    PHYSICAL THERAPY MEDICAL/SOCIAL HISTORY   History related to current admission:     ASSESSMENT/PLAN: 54 year old yo female s/p left total hip arthroplasty  for chronic femoral neck fx 2024   - [facility] hospital[facility] co management  - Weight bearing status: WBAT LLE  - no hip precautions  - Mechanical DVT prophylaxis and chemoprophylaxis with ASA 81 BID   - 24 hours of perioperative antibiotics  - PT for mobilization and motion  - Advance diet as tolerated. Discontinue IV fluids POD1 if tolerating diet.   - Multimodal pain control regimen ordered  - Disposition: Pending PT Evaluation. Home with outpatient PT at home.     Omar Behery, MD  [facility]     Problem List  Principal Problem:    Arthritis of right hip  Active Problems:    Mirizzi's syndrome (HCC)    Primary hypertension    Primary biliary cholangitis (HCC)    Autoimmune hepatitis (HCC)    Elevated INR    Hyperbilirubinemia    Status post right hip replacement    Current chronic use of systemic steroids      HOME SITUATION  Home Situation  Type of Home: House  Home Layout: Two level  Stairs to Enter : 3  Railing: Yes  Stairs to Bedroom: 10  Railing: Yes  Lives With: Son  Drives: Yes  Patient Owned Equipment: Crutches  Patient Regularly Uses: Glasses       SUBJECTIVE  Pt reports confidence in DC Plan for home today.  At end of session denies any questions or concerns . Pain is well controlled.     PHYSICAL THERAPY EXAMINATION   OBJECTIVE  Precautions: Limb alert - left  Fall Risk: High fall risk    WEIGHT BEARING RESTRICTION  Weight Bearing Restriction: L lower extremity           L Lower Extremity: Weight Bearing as Tolerated    PAIN ASSESSMENT  Ratin  Location: after activity pressure  Management Techniques: Activity promotion;Body mechanics;Breathing techniques;Relaxation;Repositioning    COGNITION  Overall Cognitive Status:  WFL - within functional limits    RANGE OF MOTION AND STRENGTH ASSESSMENT  Upper extremity ROM and strength are within functional limits   R Lower extremity ROM is within functional limits   R Lower extremity strength is within functional limits     Decreased  left hip ROM and strength post anterior MALCOLM not formally assessed > pt with intact and strong B ankle DF and PF returning demonstration of B AP and B QS with min cues . Pt denies any left LE instability during fxn mobility     BALANCE  Static Sitting: Fair +  Dynamic Sitting: Not tested  Static Standing: Fair -  Dynamic Standing: Fair -       ACTIVITY TOLERANCE  Pulse: 77        BP: 109/58             O2 WALK  Oxygen Therapy  SPO2% Ambulation on Room Air: 100    AM-PAC '6-Clicks' INPATIENT SHORT FORM - BASIC MOBILITY  How much difficulty does the patient currently have...  Patient Difficulty: Turning over in bed (including adjusting bedclothes, sheets and blankets)?: A Little   Patient Difficulty: Sitting down on and standing up from a chair with arms (e.g., wheelchair, bedside commode, etc.): A Little   Patient Difficulty: Moving from lying on back to sitting on the side of the bed?: A Little   How much help from another person does the patient currently need...   Help from Another: Moving to and from a bed to a chair (including a wheelchair)?: A Little   Help from Another: Need to walk in hospital room?: A Little   Help from Another: Climbing 3-5 steps with a railing?: A Little     AM-PAC Score:  Raw Score: 18   Approx Degree of Impairment: 46.58%   Standardized Score (AM-PAC Scale): 43.63   CMS Modifier (G-Code): CK    FUNCTIONAL ABILITY STATUS  Functional Mobility/Gait Assessment  Gait Assistance: Contact guard assist  Distance (ft): 150-200  ft x 1  rest provided   150 ft x 1  Assistive Device: Rolling walker  Pattern: L Decreased stance time;R Steppage;L Steppage (progressed to step through gait pattern)  Stairs: Stairs;Stoop/curb  How Many Stairs: 4 steps x 1 with SPC and rails rest provided    returned 8 steps x 1 simulating home with use of crutch and rail  Device: 1 Rail;1 Crutch;Cane  Assist: Contact guard assist  Pattern: Ascend and Descend  Ascend and Descend : Step to  Stoop/Curb: min assist with  RW    Supine to Sit: brief min assist for left LE   Sit to Supine: brief min assist for left LE   Sit to Stand: contact guard assist cues     Patient received seated edge of bed, agreeable to physical therapy. Vital signs monitored as noted above, no adverse symptoms and patient stable during session.     Education with pt and sone provided verbally, via demonstration, on written handout, and while practicing during session on Weight bearing status, Physical therapy plan of care, physiological benefits of out of bed mobility, and post op anterior MALCOLM therapy pt education, fall risk prevention , fxn mobility with RW , B AP and QS and DC Planning  .   The patient's Approx Degree of Impairment: 46.58% has been calculated based on documentation in the [facility] '6 clicks' Inpatient Basic Mobility Short Form.  Research supports that patients with this level of impairment may benefit from Home with  PT .Pt reports confidence in DC Plan for home today.  At end of session denies any questions or concerns . Pain is well controlled.  Pt will need Rw for DC RN informed. Pt has crutches.  Therapy anticipates DC to home setting with  PT and 24hr family support when medically stable  Final disposition will be made by interdisciplinary medical team.    Patient End of Session: Up in chair;Needs met;Call light within reach;RN aware of session/findings;All patient questions and concerns addressed;Family present    CURRENT GOALS  Goals to be met by: 9/24/24  Patient Goal Patient's self-stated goal is: home with  PT    Goal #1 Patient is able to demonstrate supine - sit EOB @ level: modified independent   Goal #1   Current Status    Goal #2 Patient is able to demonstrate transfers Sit to/from Stand at assistance level: modified independent     Goal #2  Current Status    Goal #3 Patient is able to ambulate 300 feet with assistive device at assistance level: modified independent    Goal #3   Current Status    Goal #4 Patient will  negotiate 8 stairs/one curb w/ assistive device and supervision   Goal #4   Current Status    Goal #5 Patient verbalizes and/or demonstrates all precautions and safety concerns independently   Goal #5   Current Status    Goal #6 Patient independently performs home exercise program for ROM/strengthening per the instructions provided in preparation for discharge.   Goal #6  Current Status      Patient Evaluation Complexity Level:  History Moderate - 1 or 2 personal factors and/or co-morbidities   Examination of body systems Moderate - addressing a total of 3 or more elements   Clinical Presentation Low- Stable   Clinical Decision Making  Moderate Complexity   PT eval and therapy activity 3 units

## 2024-09-11 ENCOUNTER — HOSPITAL ENCOUNTER (INPATIENT)
Facility: HOSPITAL | Age: 54
LOS: 4 days | Discharge: ANOTHER HOSPITAL | End: 2024-09-15
Attending: EMERGENCY MEDICINE | Admitting: INTERNAL MEDICINE
Payer: COMMERCIAL

## 2024-09-11 ENCOUNTER — APPOINTMENT (OUTPATIENT)
Dept: GENERAL RADIOLOGY | Facility: HOSPITAL | Age: 54
End: 2024-09-11
Attending: EMERGENCY MEDICINE
Payer: COMMERCIAL

## 2024-09-11 ENCOUNTER — APPOINTMENT (OUTPATIENT)
Dept: ULTRASOUND IMAGING | Facility: HOSPITAL | Age: 54
End: 2024-09-11
Attending: EMERGENCY MEDICINE
Payer: COMMERCIAL

## 2024-09-11 DIAGNOSIS — D72.829 LEUKOCYTOSIS, UNSPECIFIED TYPE: ICD-10-CM

## 2024-09-11 DIAGNOSIS — D64.9 ANEMIA, UNSPECIFIED TYPE: ICD-10-CM

## 2024-09-11 DIAGNOSIS — R50.9 FEBRILE ILLNESS: Primary | ICD-10-CM

## 2024-09-11 DIAGNOSIS — K75.4 AUTOIMMUNE HEPATITIS (HCC): ICD-10-CM

## 2024-09-11 DIAGNOSIS — N17.9 ACUTE KIDNEY INJURY (HCC): ICD-10-CM

## 2024-09-11 LAB
ALBUMIN SERPL-MCNC: 2.4 G/DL (ref 3.2–4.8)
ALP LIVER SERPL-CCNC: 325 U/L
ALT SERPL-CCNC: 154 U/L
ANION GAP SERPL CALC-SCNC: 15 MMOL/L (ref 0–18)
ANTIBODY SCREEN: NEGATIVE
AST SERPL-CCNC: 227 U/L (ref ?–34)
BASOPHILS # BLD: 0 X10(3) UL (ref 0–0.2)
BASOPHILS NFR BLD: 0 %
BILIRUB DIRECT SERPL-MCNC: 20.7 MG/DL (ref ?–0.3)
BILIRUB SERPL-MCNC: 26.3 MG/DL (ref 0.3–1.2)
BILIRUB UR QL CFM: POSITIVE
BUN BLD-MCNC: 72 MG/DL (ref 9–23)
BUN/CREAT SERPL: 26.6 (ref 10–20)
CALCIUM BLD-MCNC: 7.9 MG/DL (ref 8.7–10.4)
CHLORIDE SERPL-SCNC: 111 MMOL/L (ref 98–112)
CO2 SERPL-SCNC: 16 MMOL/L (ref 21–32)
CREAT BLD-MCNC: 2.71 MG/DL
CRP SERPL-MCNC: 4.3 MG/DL (ref ?–1)
DEPRECATED RDW RBC AUTO: 77.2 FL (ref 35.1–46.3)
EGFRCR SERPLBLD CKD-EPI 2021: 20 ML/MIN/1.73M2 (ref 60–?)
EOSINOPHIL # BLD: 0 X10(3) UL (ref 0–0.7)
EOSINOPHIL NFR BLD: 0 %
ERYTHROCYTE [DISTWIDTH] IN BLOOD BY AUTOMATED COUNT: 22.8 % (ref 11–15)
ERYTHROCYTE [SEDIMENTATION RATE] IN BLOOD: 16 MM/HR
GLUCOSE BLD-MCNC: 125 MG/DL (ref 70–99)
GLUCOSE UR-MCNC: NORMAL MG/DL
HCT VFR BLD AUTO: 18.6 %
HGB BLD-MCNC: 6.1 G/DL
HGB UR QL STRIP.AUTO: NEGATIVE
INR BLD: 5.72 (ref 0.8–1.2)
KETONES UR-MCNC: NEGATIVE MG/DL
LEUKOCYTE ESTERASE UR QL STRIP.AUTO: NEGATIVE
LYMPHOCYTES NFR BLD: 2.16 X10(3) UL (ref 1–4)
LYMPHOCYTES NFR BLD: 6 %
MCH RBC QN AUTO: 33.2 PG (ref 26–34)
MCHC RBC AUTO-ENTMCNC: 32.8 G/DL (ref 31–37)
MCV RBC AUTO: 101.1 FL
METAMYELOCYTES # BLD: 0.72 X10(3) UL
METAMYELOCYTES NFR BLD: 2 %
MONOCYTES # BLD: 1.8 X10(3) UL (ref 0.1–1)
MONOCYTES NFR BLD: 5 %
MYELOCYTES # BLD: 0.72 X10(3) UL
MYELOCYTES NFR BLD: 2 %
NEUTROPHILS # BLD AUTO: 30.97 X10 (3) UL (ref 1.5–7.7)
NEUTROPHILS NFR BLD: 83 %
NEUTS BAND NFR BLD: 2 %
NEUTS HYPERSEG # BLD: 30.6 X10(3) UL (ref 1.5–7.7)
NITRITE UR QL STRIP.AUTO: NEGATIVE
OSMOLALITY SERPL CALC.SUM OF ELEC: 317 MOSM/KG (ref 275–295)
PH UR: 5.5 [PH] (ref 5–8)
PLATELET # BLD AUTO: 262 10(3)UL (ref 150–450)
PLATELET MORPHOLOGY: NORMAL
POTASSIUM SERPL-SCNC: 3.6 MMOL/L (ref 3.5–5.1)
PROT SERPL-MCNC: 5 G/DL (ref 5.7–8.2)
PROT UR-MCNC: NEGATIVE MG/DL
PROTHROMBIN TIME: 55.1 SECONDS (ref 11.6–14.8)
RBC # BLD AUTO: 1.84 X10(6)UL
RH BLOOD TYPE: POSITIVE
SARS-COV-2 RNA RESP QL NAA+PROBE: NOT DETECTED
SODIUM SERPL-SCNC: 142 MMOL/L (ref 136–145)
SP GR UR STRIP: 1.01 (ref 1–1.03)
TOTAL CELLS COUNTED BLD: 100
UROBILINOGEN UR STRIP-ACNC: NORMAL
WBC # BLD AUTO: 36 X10(3) UL (ref 4–11)

## 2024-09-11 PROCEDURE — 71045 X-RAY EXAM CHEST 1 VIEW: CPT | Performed by: EMERGENCY MEDICINE

## 2024-09-11 PROCEDURE — 93971 EXTREMITY STUDY: CPT | Performed by: EMERGENCY MEDICINE

## 2024-09-11 PROCEDURE — 99223 1ST HOSP IP/OBS HIGH 75: CPT | Performed by: HOSPITALIST

## 2024-09-12 ENCOUNTER — APPOINTMENT (OUTPATIENT)
Dept: ULTRASOUND IMAGING | Facility: HOSPITAL | Age: 54
End: 2024-09-12
Attending: INTERNAL MEDICINE
Payer: COMMERCIAL

## 2024-09-12 PROBLEM — E87.20 METABOLIC ACIDOSIS: Status: ACTIVE | Noted: 2024-09-12

## 2024-09-12 PROBLEM — N17.9 AKI (ACUTE KIDNEY INJURY) (HCC): Status: ACTIVE | Noted: 2024-09-11

## 2024-09-12 LAB
ANION GAP SERPL CALC-SCNC: 13 MMOL/L (ref 0–18)
BASOPHILS # BLD: 0 X10(3) UL (ref 0–0.2)
BASOPHILS NFR BLD: 0 %
BUN BLD-MCNC: 57 MG/DL (ref 9–23)
BUN/CREAT SERPL: 20.6 (ref 10–20)
CALCIUM BLD-MCNC: 8.1 MG/DL (ref 8.7–10.4)
CHLORIDE SERPL-SCNC: 112 MMOL/L (ref 98–112)
CO2 SERPL-SCNC: 14 MMOL/L (ref 21–32)
CREAT BLD-MCNC: 2.77 MG/DL
CREAT UR-SCNC: 64.2 MG/DL
DEPRECATED HBV CORE AB SER IA-ACNC: 801.7 NG/ML
DEPRECATED RDW RBC AUTO: 71.7 FL (ref 35.1–46.3)
EGFRCR SERPLBLD CKD-EPI 2021: 19 ML/MIN/1.73M2 (ref 60–?)
EOSINOPHIL # BLD: 0 X10(3) UL (ref 0–0.7)
EOSINOPHIL NFR BLD: 0 %
ERYTHROCYTE [DISTWIDTH] IN BLOOD BY AUTOMATED COUNT: 21.1 % (ref 11–15)
GLUCOSE BLD-MCNC: 98 MG/DL (ref 70–99)
HCT VFR BLD AUTO: 19.9 %
HCT VFR BLD AUTO: 21.5 %
HGB BLD-MCNC: 6.1 G/DL
HGB BLD-MCNC: 7.4 G/DL
INR BLD: 2.46 (ref 0.8–1.2)
IRON SATN MFR SERPL: 31 %
IRON SERPL-MCNC: 67 UG/DL
LACTATE SERPL-SCNC: 1.1 MMOL/L (ref 0.5–2)
LACTATE SERPL-SCNC: 2.2 MMOL/L (ref 0.5–2)
LYMPHOCYTES NFR BLD: 2.07 X10(3) UL (ref 1–4)
LYMPHOCYTES NFR BLD: 7 %
MCH RBC QN AUTO: 31.6 PG (ref 26–34)
MCHC RBC AUTO-ENTMCNC: 30.7 G/DL (ref 31–37)
MCV RBC AUTO: 103.1 FL
MONOCYTES # BLD: 0.59 X10(3) UL (ref 0.1–1)
MONOCYTES NFR BLD: 2 %
NEUTROPHILS # BLD AUTO: 24.72 X10 (3) UL (ref 1.5–7.7)
NEUTROPHILS NFR BLD: 89 %
NEUTS BAND NFR BLD: 2 %
NEUTS HYPERSEG # BLD: 26.85 X10(3) UL (ref 1.5–7.7)
NRBC BLD MANUAL-RTO: 2 %
OSMOLALITY SERPL CALC.SUM OF ELEC: 304 MOSM/KG (ref 275–295)
PLATELET # BLD AUTO: 201 10(3)UL (ref 150–450)
PLATELET MORPHOLOGY: NORMAL
POTASSIUM SERPL-SCNC: 3.7 MMOL/L (ref 3.5–5.1)
PROCALCITONIN SERPL-MCNC: 1.64 NG/ML (ref ?–0.05)
PROTHROMBIN TIME: 28.2 SECONDS (ref 11.6–14.8)
RBC # BLD AUTO: 1.93 X10(6)UL
SODIUM SERPL-SCNC: 139 MMOL/L (ref 136–145)
SODIUM SERPL-SCNC: <10 MMOL/L
TIBC SERPL-MCNC: 215 UG/DL (ref 250–425)
TOTAL CELLS COUNTED BLD: 100
TRANSFERRIN SERPL-MCNC: 144 MG/DL (ref 250–380)
WBC # BLD AUTO: 29.5 X10(3) UL (ref 4–11)

## 2024-09-12 PROCEDURE — 99233 SBSQ HOSP IP/OBS HIGH 50: CPT | Performed by: HOSPITALIST

## 2024-09-12 PROCEDURE — 76770 US EXAM ABDO BACK WALL COMP: CPT | Performed by: INTERNAL MEDICINE

## 2024-09-12 PROCEDURE — 99223 1ST HOSP IP/OBS HIGH 75: CPT | Performed by: INTERNAL MEDICINE

## 2024-09-12 PROCEDURE — 30233N1 TRANSFUSION OF NONAUTOLOGOUS RED BLOOD CELLS INTO PERIPHERAL VEIN, PERCUTANEOUS APPROACH: ICD-10-PCS | Performed by: INTERNAL MEDICINE

## 2024-09-12 RX ORDER — PREDNISONE 10 MG/1
10 TABLET ORAL DAILY
Status: DISCONTINUED | OUTPATIENT
Start: 2024-09-12 | End: 2024-09-15

## 2024-09-12 RX ORDER — SODIUM CHLORIDE 9 MG/ML
INJECTION, SOLUTION INTRAVENOUS CONTINUOUS
Status: DISCONTINUED | OUTPATIENT
Start: 2024-09-12 | End: 2024-09-12

## 2024-09-12 RX ORDER — SODIUM CHLORIDE 9 MG/ML
INJECTION, SOLUTION INTRAVENOUS ONCE
Status: COMPLETED | OUTPATIENT
Start: 2024-09-12 | End: 2024-09-12

## 2024-09-12 RX ORDER — MAGNESIUM HYDROXIDE/ALUMINUM HYDROXICE/SIMETHICONE 120; 1200; 1200 MG/30ML; MG/30ML; MG/30ML
30 SUSPENSION ORAL 4 TIMES DAILY PRN
Status: DISCONTINUED | OUTPATIENT
Start: 2024-09-12 | End: 2024-09-15

## 2024-09-12 RX ORDER — VANCOMYCIN HYDROCHLORIDE
15 ONCE
Status: COMPLETED | OUTPATIENT
Start: 2024-09-12 | End: 2024-09-12

## 2024-09-12 RX ORDER — SODIUM CHLORIDE 9 MG/ML
INJECTION, SOLUTION INTRAVENOUS ONCE
Status: DISCONTINUED | OUTPATIENT
Start: 2024-09-12 | End: 2024-09-15

## 2024-09-12 RX ORDER — MYCOPHENOLATE MOFETIL 250 MG/1
500 CAPSULE ORAL 2 TIMES DAILY
Status: DISCONTINUED | OUTPATIENT
Start: 2024-09-12 | End: 2024-09-13

## 2024-09-12 RX ORDER — PANTOPRAZOLE SODIUM 40 MG/1
40 TABLET, DELAYED RELEASE ORAL
Status: DISCONTINUED | OUTPATIENT
Start: 2024-09-12 | End: 2024-09-12

## 2024-09-12 RX ORDER — ONDANSETRON 2 MG/ML
4 INJECTION INTRAMUSCULAR; INTRAVENOUS EVERY 6 HOURS PRN
Status: DISCONTINUED | OUTPATIENT
Start: 2024-09-12 | End: 2024-09-15

## 2024-09-12 RX ORDER — ZOLPIDEM TARTRATE 5 MG/1
5 TABLET ORAL NIGHTLY PRN
Status: DISCONTINUED | OUTPATIENT
Start: 2024-09-12 | End: 2024-09-14

## 2024-09-12 RX ORDER — ASPIRIN 81 MG/1
81 TABLET ORAL 2 TIMES DAILY
Status: DISCONTINUED | OUTPATIENT
Start: 2024-09-12 | End: 2024-09-15

## 2024-09-12 RX ORDER — URSODIOL 300 MG/1
600 CAPSULE ORAL 2 TIMES DAILY
Status: DISCONTINUED | OUTPATIENT
Start: 2024-09-12 | End: 2024-09-15

## 2024-09-12 NOTE — ED INITIAL ASSESSMENT (HPI)
Pt arrives through triage with family        complaints of feeling \"off\" reports she noticed her left knee swelling. Reports she recently had a left hip replacement last month. Son reports pt has not been herself since the surgery. Unsure of fevers. +chills reports feeling generalized weakness/ tired

## 2024-09-12 NOTE — CONSULTS
Spiritual Care Visit Note    Patient Name: Cammie Morleyraffenreid Date of Spiritual Care Visit: 24   : 1970 Primary Dx: Febrile illness       Referred By: Referral From: Nurse;Patient    Spiritual Care Taxonomy:    Intended Effects: Demonstrate caring and concern    Methods: Collaborate with care team member;Offer support    Interventions: Active listening;Ask guided questions;Assist someone with Advance Directives;Silent prayer    Visit Type/Summary:     - PoA: New PoAH Created: Visited patient in response to PoA for Healthcare consult/request. Created a new PoAH for Healthcare document that, per the patient, accurately reflects their wishes. Patient named Ash Field ([phone]) as primary agent and Raghav Rider ([phone]) as second. Gave the patient the original PoAH document along with copies. Confirmed that the PoAH primary agent name and contact information have been entered into the Epic, Advance Directives section. A copy of the new PoAH document has been placed on the patient's paper chart. Writer gave patient a Spiritual Care card. No other need at this time. Sons at bedside.     SARAH Casas   [id number]     Spiritual Care support can be requested via an [facility] consult. For urgent/immediate needs, please contact the On Call  at: [place]: ext [phone]

## 2024-09-12 NOTE — PLAN OF CARE
D: BT 1 unit PRBC started as ordered.  A: Monitored.   R: No adverse reaction noted.   Problem: Patient Centered Care  Goal: Patient preferences are identified and integrated in the patient's plan of care  Description: Interventions:  - What would you like us to know as we care for you?   - Provide timely, complete, and accurate information to patient/family  - Incorporate patient and family knowledge, values, beliefs, and cultural backgrounds into the planning and delivery of care  - Encourage patient/family to participate in care and decision-making at the level they choose  - Honor patient and family perspectives and choices  Outcome: Progressing     Problem: Patient/Family Goals  Goal: Patient/Family Long Term Goal  Description: Patient's Long Term Goal:     Interventions:  -   - See additional Care Plan goals for specific interventions  Outcome: Progressing  Goal: Patient/Family Short Term Goal  Description: Patient's Short Term Goal:     Interventions:   -   - See additional Care Plan goals for specific interventions  Outcome: Progressing     Problem: GENITOURINARY - ADULT  Goal: Absence of urinary retention  Description: INTERVENTIONS:  - Assess patient’s ability to void and empty bladder  - Monitor intake/output and perform bladder scan as needed  - Follow urinary retention protocol/standard of care  - Consider collaborating with pharmacy to review patient's medication profile  - Implement strategies to promote bladder emptying  Outcome: Progressing     Problem: METABOLIC/FLUID AND ELECTROLYTES - ADULT  Goal: Hemodynamic stability and optimal renal function maintained  Description: INTERVENTIONS:  - Monitor labs and assess for signs and symptoms of volume excess or deficit  - Monitor intake, output and patient weight  - Monitor urine specific gravity, serum osmolarity and serum sodium as indicated or ordered  - Monitor response to interventions for patient's volume status, including labs, urine output, blood  pressure (other measures as available)  - Encourage oral intake as appropriate  - Instruct patient on fluid and nutrition restrictions as appropriate  Outcome: Progressing     Problem: SKIN/TISSUE INTEGRITY - ADULT  Goal: Skin integrity remains intact  Description: INTERVENTIONS  - Assess and document risk factors for pressure ulcer development  - Assess and document skin integrity  - Monitor for areas of redness and/or skin breakdown  - Initiate interventions, skin care algorithm/standards of care as needed  Outcome: Progressing  Goal: Incision(s), wounds(s) or drain site(s) healing without S/S of infection  Description: INTERVENTIONS:  - Assess and document risk factors for pressure ulcer development  - Assess and document skin integrity  - Assess and document dressing/incision, wound bed, drain sites and surrounding tissue  - Implement wound care per orders  - Initiate isolation precautions as appropriate  - Initiate Pressure Ulcer prevention bundle as indicated  Outcome: Progressing     Problem: MUSCULOSKELETAL - ADULT  Goal: Return mobility to safest level of function  Description: INTERVENTIONS:  - Assess patient stability and activity tolerance for standing, transferring and ambulating w/ or w/o assistive devices  - Assist with transfers and ambulation using safe patient handling equipment as needed  - Ensure adequate protection for wounds/incisions during mobilization  - Obtain PT/OT consults as needed  - Advance activity as appropriate  - Communicate ordered activity level and limitations with patient/family  Outcome: Progressing     Problem: PAIN - ADULT  Goal: Verbalizes/displays adequate comfort level or patient's stated pain goal  Description: INTERVENTIONS:  - Encourage pt to monitor pain and request assistance  - Assess pain using appropriate pain scale  - Administer analgesics based on type and severity of pain and evaluate response  - Implement non-pharmacological measures as appropriate and  evaluate response  - Consider cultural and social influences on pain and pain management  - Manage/alleviate anxiety  - Utilize distraction and/or relaxation techniques  - Monitor for opioid side effects  - Notify MD/LIP if interventions unsuccessful or patient reports new pain  - Anticipate increased pain with activity and pre-medicate as appropriate  Outcome: Progressing     Problem: RISK FOR INFECTION - ADULT  Goal: Absence of fever/infection during anticipated neutropenic period  Description: INTERVENTIONS  - Monitor WBC  - Administer growth factors as ordered  - Implement neutropenic guidelines  Outcome: Progressing     Problem: SAFETY ADULT - FALL  Goal: Free from fall injury  Description: INTERVENTIONS:  - Assess pt frequently for physical needs  - Identify cognitive and physical deficits and behaviors that affect risk of falls.  - [place] fall precautions as indicated by assessment.  - Educate pt/family on patient safety including physical limitations  - Instruct pt to call for assistance with activity based on assessment  - Modify environment to reduce risk of injury  - Provide assistive devices as appropriate  - Consider OT/PT consult to assist with strengthening/mobility  - Encourage toileting schedule  Outcome: Progressing     Problem: DISCHARGE PLANNING  Goal: Discharge to home or other facility with appropriate resources  Description: INTERVENTIONS:  - Identify barriers to discharge w/pt and caregiver  - Include patient/family/discharge partner in discharge planning  - Arrange for needed discharge resources and transportation as appropriate  - Identify discharge learning needs (meds, wound care, etc)  - Arrange for interpreters to assist at discharge as needed  - Consider post-discharge preferences of patient/family/discharge partner  - Complete POLST form as appropriate  - Assess patient's ability to be responsible for managing their own health  - Refer to Case Management Department for coordinating  discharge planning if the patient needs post-hospital services based on physician/LIP order or complex needs related to functional status, cognitive ability or social support system  Outcome: Progressing     Problem: CONFUSION  Goal: Confusion, delirium, dementia or psychosis is improved or at baseline  Description: INTERVENTIONS:  - Assess for possible contributors to thought disturbance, including medications, impaired vision or hearing, underlying metabolic abnormalities, dehydration, psychiatric diagnoses, and notify attending MD/LIP  - [place] high risk fall precautions, as indicated  - Provide frequent short contacts to provide reality reorientation, refocusing and direction  - Decrease environmental stimuli, including noise as appropriate  - Monitor and intervene to maintain adequate nutrition, hydration, elimination, sleep and activity  - Consider the need for a sitter if unable to leave patient unattended  - Initiate Spiritual Care consult as indicated  - Consult Pharmacy as needed  Outcome: Progressing

## 2024-09-12 NOTE — ED PROVIDER NOTES
Patient Seen in: [facility] Emergency Department      History     Chief Complaint   Patient presents with    Post-Op     Stated Complaint: Post-Op Problem; Weakness    Subjective:   HPI    Patient is a 54-year-old female with a history of autoimmune hepatitis and cholangitis she had a hip replacement surgery on 8/28/2024.  She states she has been doing well with no pain over the last 2 or 3 days she is felt weaker and had some tactile fevers and felt chilled.  She denies sore throat or runny nose she denies cough she denies abdominal pain she states she might have some urinary frequency.  She has no pain in the hip the wound is without redness warmth or swelling    Objective:   Past Medical History:    Chronic obstructive pulmonary disease on long-term inhaled steroid therapy (HCC)    Disorder of liver    autoimmune disease    Essential hypertension    not taking medications    High blood pressure    off medications since 2022 per patient    High cholesterol    Mirizzi's syndrome (HCC)    Osteoarthritis    Visual impairment              Past Surgical History:   Procedure Laterality Date    Cholecystectomy      Colonoscopy N/A 12/12/2023    Procedure: COLONOSCOPY;  Surgeon: Jeison Diamond MD;  Location: [facility] ENDOSCOPY    Tonsillectomy                  Social History     Socioeconomic History    Marital status: Single   Tobacco Use    Smoking status: Never     Passive exposure: Never    Smokeless tobacco: Never   Vaping Use    Vaping status: Never Used   Substance and Sexual Activity    Alcohol use: Not Currently     Comment: Every 1 now and then    Drug use: Never     Social Determinants of Health     Food Insecurity: No Food Insecurity (8/28/2024)    Food Insecurity     Food Insecurity: Never true   Transportation Needs: No Transportation Needs (8/28/2024)    Transportation Needs     Lack of Transportation: No   Housing Stability: Low Risk  (8/28/2024)    Housing Stability     Housing Instability:  No              Review of Systems    Positive for stated Chief Complaint: Post-Op    Other systems are as noted in HPI.  Constitutional and vital signs reviewed.      All other systems reviewed and negative except as noted above.    Physical Exam     ED Triage Vitals [09/11/24 2025]   /70   Pulse 79   Resp 18   Temp 97.9 °F (36.6 °C)   Temp src Temporal   SpO2 100 %   O2 Device None (Room air)       Current Vitals:   Vital Signs  BP: 112/70  Pulse: 79  Resp: 18  Temp: 97.9 °F (36.6 °C)  Temp src: Temporal  MAP (mmHg): 84    Oxygen Therapy  SpO2: 100 %  O2 Device: None (Room air)            Physical Exam    Constitutional: Oriented to person, place, and time. Appears well-developed and well-nourished.   HEENT:   Head: Normocephalic and atraumatic.   Right Ear: External ear normal.   Left Ear: External ear normal.   Nose: Nose normal.   Mouth/Throat: Oropharynx is clear and moist.   Eyes: Conjunctivae and EOM are normal. Pupils are equal, round, and reactive to light.   Neck: Neck supple.   Cardiovascular: Normal rate, regular rhythm, normal heart sounds and intact distal pulses.    Pulmonary/Chest: Effort normal and breath sounds normal. No respiratory distress.   Abdominal: Soft. Bowel sounds are normal. Exhibits no distension and no mass. There is no tenderness. There is no rebound and no guarding.   Musculoskeletal: Left home left hip wound is clean and dry.  No signs of infection.  Left lower extremity is edematous.  No redness or warmth no tendernessLymphadenopathy: No cervical adenopathy.   Neurological: Alert and oriented to person, place, and time. Normal reflexes. No cranial nerve deficit. No motor os sensory defecits noted Coordination normal.   Skin: Skin is warm and dry.   Psychiatric: Normal mood and affect. Behavior is normal. Judgment and thought content normal.   Nursing note and vitals reviewed.        ED Course   Labs Reviewed - No data to display                  MDM      Use of independent  historian: Patient's 3 sons are in the room and help with history.    I personally reviewed and interpreted the images : No infiltrate seen on chest x-ray no DVT seen on    No results found.    Vitals:    09/11/24 2025   BP: 112/70   Pulse: 79   Resp: 18   Temp: 97.9 °F (36.6 °C)   TempSrc: Temporal   SpO2: 100%   Weight: 77.1 kg   Height: 162.6 cm (5' 4\")     *I personally reviewed and interpreted all ED vitals.    Pulse Ox: 100%, Room air, Normal     EKG interpretation above independently interpreted by me    Monitor Interpretation:   normal sinus rhythm independently interpreted by me    Differential Diagnosis/ Diagnostic Considerations: Fever and patient now 2 weeks postop.  Consider UTI consider Covid consider postop hip infection    Medical Record Review: I personally reviewed available prior medical records for any recent pertinent discharge summaries, testing, and procedures and reviewed those reports and found notes from primary care doctor riya ASHRAF reviewed.  Past medical history of primary biliary cholangitis autoimmune hepatitis medication list reviewed on aspirin for DVT prophylaxis.    Complicating Factors: The patient already has primary biliary cholangitis autoimmune hepatitis recent hip surgery.  Which contribute to the complexity of this ED evaluation.    Social determinants of health:    Prescription drug management:      Shared Decision Making:    ED Course: Workup findings shared with patient and family.  Type and screen sent will likely transfuse.  Awaiting urine patient refused straight cath.  Leukocytosis noted blood cultures drawn and antibiotics initiated I did speak with the hospitalist will admit.  Still awaiting urine    Discussion of management with other healthcare providers:    Condition upon leaving the department: Stable        I spent a total of 45 minutes of critical care time in obtaining history, performing a physical exam, bedside monitoring of interventions, collecting and  interpreting tests and discussion with consultants but not including time spent performing procedures.                             MDM    Disposition and Plan     Clinical Impression:  No diagnosis found.     Disposition:  There is no disposition on file for this visit.  There is no disposition time on file for this visit.    Follow-up:  No follow-up provider specified.        Medications Prescribed:  Current Discharge Medication List

## 2024-09-12 NOTE — H&P
[facility]  part of [facility]    History & Physical    Cammie Nunn Patient Status:  Emergency    1970 MRN [id number]   Location [facility] EMERGENCY DEPARTMENT Attending Nick Conway MD   Hosp Day # 0 PCP Endy Solorzano MD     Date:  2024  Date of Admission:  2024    Chief Complaint:  Chief Complaint   Patient presents with    Post-Op       History of Present Illness:  Cammie Nunn is a(n) 54 year old female, past medical history significant for primary biliary cholangitis, currently on immunotherapy along with recent left hip replacement presented to the ER complaining of subjective fevers, chills generalized fatigue weakness and nausea along with a poor appetite ongoing for the past 2 days.  Describes the onset as gradual with progressive worsening with no clear aggravating or relieving factors.  Workup in ER indicated worsening of renal function and a drop in hemoglobin.  Patient admits to rather dark stools of late despite her marked hyperbilirubinemia.    History:  Past Medical History:    Chronic obstructive pulmonary disease on long-term inhaled steroid therapy (HCC)    Disorder of liver    autoimmune disease    Essential hypertension    not taking medications    Febrile illness    High blood pressure    off medications since  per patient    High cholesterol    Mirizzi's syndrome (HCC)    Osteoarthritis    Visual impairment     Past Surgical History:   Procedure Laterality Date    Cholecystectomy      Colonoscopy N/A 2023    Procedure: COLONOSCOPY;  Surgeon: Jeison Diamond MD;  Location: [facility] ENDOSCOPY    Tonsillectomy       Family History   Problem Relation Age of Onset    Lipids Mother     Hypertension Mother     Diabetes Mother     Cancer Mother       reports that she has never smoked. She has never been exposed to tobacco smoke. She has never used smokeless tobacco. She reports that she does not currently use alcohol.  She reports that she does not use drugs.    Allergies:  No Known Allergies    Home Medications:  Prior to Admission Medications   Prescriptions Last Dose Informant Patient Reported? Taking?   Mycophenolate Mofetil 500 MG Oral Tab   Yes No   Sig: Take 1 tablet (500 mg total) by mouth 2 (two) times daily.   aspirin 81 MG Oral Tab EC   No No   Sig: Take 1 tablet (81 mg total) by mouth in the morning and 1 tablet (81 mg total) before bedtime.   predniSONE 10 MG Oral Tab   Yes No   Sig: Take 1 tablet (10 mg total) by mouth daily.   ursodiol 300 MG Oral Cap   No No   Sig: Take 2 capsules (600 mg total) by mouth 2 (two) times daily.      Facility-Administered Medications: None       Review of Systems:  Constitutional:  Weakness, Fatigue.  Feverish  Eye:  Negative.  Ear/Nose/Mouth/Throat:  Negative.  Respiratory:  Negative  Cardiovascular: Negative  Gastrointestinal: Nausea  Genitourinary:  Negative  Endocrine:  Negative.  Immunologic:  Negative.  Musculoskeletal:  Negative.  Integumentary:  Negative.  Neurologic:  Negative.  Psychiatric:  Negative.  ROS reviewed as documented in chart    Physical Exam:  Temp:  [97.9 °F (36.6 °C)] 97.9 °F (36.6 °C)  Pulse:  [70-79] 72  Resp:  [17-32] 32  BP: (112-129)/(56-70) 127/57  SpO2:  [100 %] 100 %    General:  Alert and oriented.  Diffuse skin problem: Left hip incision looks clean dry and intact for the most part except for a small area at the top of the incision with questionable exudate  Eye:  Pupils are equal, round and reactive to light, extraocular movements are intact, pale/icteric conjunctiva.  HENT:  Normocephalic, oral mucosa is moist.  Head:  Normocephalic, atraumatic.  Neck:  Supple, non-tender, no carotid bruit, no jugular venous distention, no lymphadenopathy, no thyromegaly.  Respiratory:  Lungs are clear to auscultation, respirations are non-labored, breath sounds are equal, symmetrical chest wall expansion.  Cardiovascular:  Normal rate, regular rhythm, no murmur, 2+  bilateral lower extremity pitting edema.  Gastrointestinal:  Soft, non-tender, non-distended, normal bowel sounds, no organomegaly.  Lymphatics:  No lymphadenopathy neck, axilla, groin.  Musculoskeletal: Normal range of motion.  normal strength.  Feet:  Normal pulses.  Neurologic:  Alert, oriented, no focal deficits, cranial nerves II-XII are grossly intact.  Cognition and Speech:  Oriented, speech clear and coherent.  Psychiatric:  Cooperative, appropriate mood & affect.      Laboratory Data:   Lab Results   Component Value Date    WBC 36.0 09/11/2024    HGB 6.1 09/11/2024    HCT 18.6 09/11/2024    .0 09/11/2024    CREATSERUM 2.71 09/11/2024    BUN 72 09/11/2024     09/11/2024    K 3.6 09/11/2024     09/11/2024    CO2 16.0 09/11/2024     09/11/2024    CA 7.9 09/11/2024    ALB 2.4 09/11/2024    ALKPHO 325 09/11/2024    BILT 26.3 09/11/2024    TP 5.0 09/11/2024     09/11/2024     09/11/2024    INR 5.72 09/11/2024    ESRML 16 09/11/2024    CRP 4.30 09/11/2024       Imaging:  XR CHEST AP PORTABLE  (CPT=71045)    Result Date: 9/11/2024  CONCLUSION:  Expiratory chest without acute cardiopulmonary process.   [facility]  Dictated by (CST): Mustapha Barrera MD on 9/11/2024 at 9:48 PM     Finalized by (CST): Mustapha Barrera MD on 9/11/2024 at 9:48 PM            Assessment and Plan:    Acute kidney injury  IV fluids initiated, avoid nephrotoxic medications, repeat BMP in a.m.  Will check FENa.  If renal function worsens consider nephrology consult.    Acute blood loss anemia  Possibly postoperative versus GI bleed, patient to be transfused 1 unit packed RBC, repeat CBC in a.m., check iron studies along with stool for occult blood.  GI to be consulted    Metabolic acidosis  Likely related dehydration versus early sepsis, check lactate levels, continue fluids will repeat labs in AM.    Primary biliary cholangitis  Worsening of LFTs, GI on board    Leukocytosis  Likely  steroid-induced, no obvious signs of infection at this time, patient given Zosyn in ER we will check procalcitonin if elevated will resume Zosyn and add vancomycin for possible postoperative infection.  Consider need for orthopedic consult at that time.  Will check blood cultures as well    Coagulopathy  Likely secondary to liver pathology, will give vitamin K 10 mg IV x 1 now    Prophylaxis  Supratherapeutic INR, pathological    CODE STATUS  Full    Primary care physician  Endy Solorzano MD    MDM: High, acute illness/severe exacerbation of chronic illness posing threat to life.  IV medications requiring close inpatient monitoring  60 minutes spent on this admission - examining patient, obtaining history, reviewing previous medical records, going over test results/imaging and discussing plan of care. All questions answered.     Disposition  Clinical course will dictate outcome      TACHO OSWALD MD  9/11/2024  11:19 PM

## 2024-09-12 NOTE — OCCUPATIONAL THERAPY NOTE
OCCUPATIONAL THERAPY EVALUATION - INPATIENT     Room Number: 531/531-A  Evaluation Date: 9/12/2024  Type of Evaluation: Initial     Present Illness: febrile illness  Physician Order: IP Consult to Occupational Therapy  Reason for Therapy: ADL/IADL Dysfunction and Discharge Planning    OCCUPATIONAL THERAPY ASSESSMENT   Patient is a 54 year old female admitted 9/11/2024 for febrile illness; L MALCOLM (8/28).  Prior to 8/28 admission, patient's baseline is independent with all ADLs and functional mobility. Pt reporting that she was functioning independently after discharge (8/29) until she began to feel weak, chills, and poor PO intake. Patient is currently functioning below baseline with toileting, bathing, lower body dressing, grooming, bed mobility, transfers, and functional standing tolerance.  Patient is requiring stand-by assist and contact guard assist as a result of the following impairments: decreased functional strength, decreased endurance, decreased muscular endurance, and medical status. Occupational Therapy will continue to follow for duration of hospitalization.    Patient will benefit from continued skilled OT Services with no additional skilled services but increased support at home pending pt progress with IP therapy.     PLAN  OT Treatment Plan: Energy conservation/work simplification techniques;ADL training;Functional transfer training;Patient/Family education;Patient/Family training;Equipment eval/education;Compensatory technique education  OT Device Recommendations: Transfer tub bench    OCCUPATIONAL THERAPY MEDICAL/SOCIAL HISTORY   Problem List  Principal Problem:    Febrile illness  Active Problems:    Leukocytosis, unspecified type    JEM (acute kidney injury) (HCC)    Anemia, unspecified type    Metabolic acidosis    HOME SITUATION   Type of Home: House  Home layout: Two level  Lives with: son   Toilet and equipment: standard height toilet  Shower/tub equipment: shower-tub combo   Patient  regularly uses: glasses       Prior Level of [place]: Prior to L MALCOLM on  pt was independent with all ADLs and functional mobility. Pt reported that she was initially independent after discharging home, but became slowly weaker and required more assist.     SUBJECTIVE  \"I feel weak\"     OCCUPATIONAL THERAPY EXAMINATION    OBJECTIVE  Fall Risk: High fall risk    PAIN ASSESSMENT  Ratin  WB Status: WBAT per EMR (L MALCOLM )     RANGE OF MOTION   Upper extremity ROM is within functional limits     STRENGTH ASSESSMENT  Upper extremity strength is within functional limits     COORDINATION  Gross Motor: WFL   Fine Motor: WFL     ACTIVITIES OF DAILY LIVING ASSESSMENT  AM-PAC ‘6-Clicks’ Inpatient Daily Activity Short Form  How much help from another person does the patient currently need…  -   Putting on and taking off regular lower body clothing?: A Lot  -   Bathing (including washing, rinsing, drying)?: A Lot  -   Toileting, which includes using toilet, bedpan or urinal? : A Little  -   Putting on and taking off regular upper body clothing?: A Little  -   Taking care of personal grooming such as brushing teeth?: A Little  -   Eating meals?: A Little    AM-PAC Score:  Score: 16  Approx Degree of Impairment: 53.32%  Standardized Score (AM-PAC Scale): 35.96  CMS Modifier (G-Code): CK    FUNCTIONAL TRANSFER ASSESSMENT  Sit to Stand: Edge of Bed  Edge of Bed: Stand-by Assist  Toilet Transfer: Contact Guard Assist    BED MOBILITY  Supine to Sit : Contact Guard Assist    BALANCE ASSESSMENT  Static Sitting: Stand-by Assist    FUNCTIONAL ADL ASSESSMENT  Toileting Seated: Supervision         Skilled Therapy Provided:   Pt received laying in bed and agreeable to therapy. Pt educated on role of OT, plan for session, bed mobility, functional transfers (toilet transfer), and grooming management. Pt responded well to education and demonstrated no LOB or SOB during session.     Pt with slow speed of movement throughout  session, anticipate due to fatigue, and required ~3 verbal cues for proper hand placement when completing sit<>stand transfers (EOB, toilet, recliner) while using RW. Pt would continue to benefit from item retrieval tasks to mirror household distances, standing grooming tasks to increase endurance for ADLs at discharge, and LB dressing strategies.       EDUCATION PROVIDED  Patient: Plan of Care; Role of Occupational Therapy; Functional Transfer Techniques; Posture/Positioning; Proper Body Mechanics  Patient's Response to Education: Verbalized Understanding; Returned Demonstration    The patient's Approx Degree of Impairment: 53.32% has been calculated based on documentation in the [facility] '6 clicks' Inpatient Daily Activity Short Form.  Research supports that patients with this level of impairment may benefit from rehab but anticipate no skilled OT services at discharge if strength improves during admission.  Final disposition will be made by interdisciplinary medical team.     Patient End of Session: Up in chair;Needs met;RN aware of session/findings;Call light within reach;All patient questions and concerns addressed;Alarm set    OT Goals  Patients self stated goal is: none     Patient will complete functional transfer with mod I   Comment:     Patient will complete toileting with mod I   Comment:     Patient will tolerate standing for 5 minutes in prep for adls mod I    Comment:    Patient will complete item retrieval mod I   Comment:          Goals  on: 24  Frequency: 3-5x/week    Patient Evaluation Complexity Level:   Occupational Profile/Medical History MODERATE - Expanded review of history including review of medical or therapy record   Specific performance deficits impacting engagement in ADL/IADL LOW  1 - 3 performance deficits    Client Assessment/Performance Deficits MODERATE - Comorbidities and min to mod modifications of tasks    Clinical Decision Making LOW - Analysis of occupational profile,  problem-focused assessments, limited treatment options    Overall Complexity LOW     OT Session Time: 30 minutes  Self-Care Home Management: 30 minutes

## 2024-09-12 NOTE — PROGRESS NOTES
[facility] Pharmacy Dosing Service      Initial Pharmacokinetic Consult for Vancomycin Dosing     Cammie Nunn is a 54 year old female who is being initiated on vancomycin therapy for cellulitis.  Pharmacy has been asked to dose vancomycin by Guy Guerin MD .  The initial treatment and monitoring approach will be non-AUC strategy.        Weight and Temperature:    Wt Readings from Last 1 Encounters:   24 84.6 kg (186 lb 9.6 oz)        Temp Readings from Last 1 Encounters:   24 97.5 °F (36.4 °C) (Oral)      Labs:   Recent Labs   Lab 248   CREATSERUM 2.71*      Estimated Creatinine Clearance: 20 mL/min (A) (based on SCr of 2.71 mg/dL (H)).     Recent Labs   Lab 24   WBC 36.0*          The Pharmacokinetic Target is:    Trough/random 10-15 mg/L    Renal Dosing Considerations:    JEM/ARF     Assessment/Plan:   Initial/Loading dose: Will receive 1250 mg IV (15 mg/kg, capped at 2250 mg) x 1 initial dose.      Maintenance dose: Pharmacy will dose vancomycin at 1250 mg IV per levels    Monitorin) Plan for vancomycin trough to be obtained in approximately 72 hours    2) Pharmacy will order SCr as clinically indicated to assess renal function.    3) Pharmacy will monitor for toxicity and efficacy, adjust vancomycin dose and/or frequency, and order vancomycin levels as appropriate per the Pharmacy and Therapeutics Committee approved protocol until discontinuation of the medication.       We appreciate the opportunity to assist in the care of this patient.     Elmer Saenz, PharmD  2024  4:58 AM  [place]  Pharmacy Extension: [phone]

## 2024-09-12 NOTE — ED QUICK NOTES
Orders for admission, patient is aware of plan and ready to go upstairs. Any questions, please call ED RN Lizzie at extension [phone].     Patient Covid vaccination status: Unvaccinated     COVID Test Ordered in ED: Rapid SARS-CoV-2 by PCR    COVID Suspicion at Admission: N/A    Running Infusions:  None    Mental Status/LOC at time of transport: A&Ox3 (baseline is 4)    Other pertinent information: very weak; high fall risk  CIWA score: N/A   NIH score:  N/A

## 2024-09-12 NOTE — PROGRESS NOTES
PT orders received chart reviewed. Hgb is 6.1 and PT will hold at this time. PT to progress as medical progress allows.

## 2024-09-12 NOTE — PROGRESS NOTES
09/12/24 1003   Wound 09/11/24 Hip Anterior;Left   Date First Assessed/Time First Assessed: 09/11/24 1443   Present on Original Admission: Yes  Primary Wound Type: Old surgical  Location: Hip  Wound Location Orientation: Anterior;Left  Wound Description (Comments): s/p sx 8/28 w Beherey intact skin glue   Wound Image    Site Assessment Dry;Intact;Fragile  (skin glue)   Closure Skin glue   Drainage Amount None   Dressing Open to air   Wound Length (cm)   (traced by admit nurse)   Janette-wound Assessment Dry;Intact;Fragile;Pink   Wound Odor None   Wound 09/12/24 Coccyx Medial   Date First Assessed/Time First Assessed: 09/12/24 0255   Present on Original Admission: Yes  Primary Wound Type: Friction/Shear  Location: Coccyx  Wound Location Orientation: Medial  Wound Description (Comments): irregular shaped wound   Wound Image    Site Assessment Dry;Fragile;Pink;Painful   Drainage Amount None   Treatments Site Care;Topical (Barrier/Moisturizer/Ointment)   Dressing Aquacel Foam   Dressing Changed New   Dressing Status Clean;Intact;Dry   Wound Length (cm)   (traced on admit)   Wound Depth (cm) 0.1 cm   Non-staged Wound Description Partial thickness   Janette-wound Assessment Dry;Fragile;Pink   Wound Granulation Tissue Pink   Wound Bed Granulation (%) 100 %   State of Healing Early/partial granulation   Wound Odor None   Wound Follow Up   Follow up needed No     Wound Care Services  Nursing consult to assess the pt's left hip and coccyx. The pt. is awake in bed, her sons are at the bedside. The pt is s/p hip surgery 08/2024, old incision with intact skin glue is intact, no exudate. The pt. assisted with turning in bed. See the wound assessments. Buttocks small area of shear. Skin care provided and sacral foam applied. Encouraged the pt. to turn in bed, elevate heels that are intact and legs that are edematous. Call light in reach. The pt. has no questions. The nurse is at the bedside assisting.

## 2024-09-12 NOTE — PROGRESS NOTES
[facility]  part of [facility]    Progress Note    Cammie Nunn Patient Status:  Inpatient    1970 MRN [id number]   Location [facility] 5SW/SE Attending Abimael Mcfadden MD   Hosp Day # 1 PCP Endy Solorzano MD       Subjective:   Cammie Nunn is a(n) 54 year old female was seen and examined  Pt lying in bed, appears fatigued, jaundiced  Family at bedside  Pt denies any cp, sob, f,c,n,v abd pain or HA  Endorses SOB with activity  Family reports memory issues of late    Objective:   Blood pressure 109/53, pulse 73, temperature 97.3 °F (36.3 °C), temperature source Oral, resp. rate 19, height 5' 4\" (1.626 m), weight 186 lb 9.6 oz (84.6 kg), last menstrual period 10/17/2018, SpO2 100%.    GENERAL:  The patient appeared to be in no distress and was comfortable. Appears fatigued   SKIN:  Warm and hydrated  PSYCHIATRIC: Calm and cooperative    HEENT:  Head was atraumatic and normocephalic.  Eyes: Sclera was icteric.  Pupils were equal.  Ears:  There were no lesions.  Nose:  No lesions were noted.      NECK:  Supple.  There was no JVD.    CHEST:  Symmetrical movement on inspiration  CARDIAC: S1 S2+, RRR  LUNGS:  CTAB with decreased entry at bases  ABDOMEN: Non-distended, non-tender, BS+  MUSCULOSKELETAL:  There was no deformity.  There was full range of motion in all the extremities.    EXTREMITIES: There was no edema  NEUROLOGIC: Cranial nerves II-XII intact, no focal defecits    Current Inpatient Medications:     Current Facility-Administered Medications:     mycophenolate mofetil (Cellcept) cap 500 mg, 500 mg, Oral, BID    aspirin DR tab 81 mg, 81 mg, Oral, BID    ursodiol (Actigall) cap 600 mg, 600 mg, Oral, BID    predniSONE (Deltasone) tab 10 mg, 10 mg, Oral, Daily    ondansetron (Zofran) 4 MG/2ML injection 4 mg, 4 mg, Intravenous, Q6H PRN    sodium chloride 0.9% infusion, , Intravenous, Continuous    zolpidem (Ambien) tab 5 mg, 5 mg, Oral, Nightly PRN     alum-mag hydroxide-simethicone (Maalox) 200-200-20 MG/5ML oral suspension 30 mL, 30 mL, Oral, QID PRN    pantoprazole (Protonix) 40 mg in sodium chloride 0.9% PF 10 mL IV push, 40 mg, Intravenous, Daily    piperacillin-tazobactam (Zosyn) 3.375 g in dextrose 5% 100 mL IVPB-ADDV, 3.375 g, Intravenous, q12h    Vancomycin: PHARMACY DOSING, 1 each, Intravenous, See Admin Instructions (RX holding)    sodium chloride 0.9% infusion, , Intravenous, Once    Assessment and Plan:   Acute kidney injury  Likely pre-renal etiology due to poor PO intake and profound anemia  Nephrology has been consulted  Started on bircarb gtt  Avoid nephrotoxins  Renal US ordered     Acute blood loss anemia  Pt on ASA BID, reports black stools of late, consider UGI bleed  Pt has rec'd 2 U PRBCs  Hgb >7  GI has been consulted  Will need endoscopic eval, timing TBD      Metabolic acidosis  Due to renal failure  Started on bicarb gtt  Cont to monitor     Primary biliary cholangitis/Autoimmune hepatitis   Worsening of LFTs, GI on board  Cont to monitor      Leukocytosis  Likely reactive, bcx pending   No fevers      Coagulopathy  Likely secondary to liver pathology, given 1 dose Vit K  INR 2.46     Prophylaxis  Supratherapeutic INR, pathological     CODE STATUS  Full        Results:     Recent Labs   Lab 09/11/24 2138 09/12/24  0620   RBC 1.84* 1.93*   HGB 6.1* 6.1*   HCT 18.6* 19.9*   .1* 103.1*   MCH 33.2 31.6   MCHC 32.8 30.7*   RDW 22.8* 21.1*   NEPRELIM 30.97* 24.72*   WBC 36.0* 29.5*   .0 201.0         Recent Labs   Lab 09/11/24 2138 09/12/24  0620   * 98   BUN 72* 57*   CREATSERUM 2.71* 2.77*   EGFRCR 20* 19*   CA 7.9* 8.1*    139   K 3.6 3.7    112   CO2 16.0* 14.0*         Imaging:   US VENOUS DOPPLER LEG LEFT - DIAG IMG (CPT=93971)    Result Date: 9/12/2024  CONCLUSION:  Negative for sonographic evidence of deep venous thrombosis in the left lower extremity.    A preliminary report was issued by the [facility] teleradiology service. There are no major discrepancies.   Dictated by (CST): Kee Sosa MD on 9/12/2024 at 5:41 AM     Finalized by (CST): Kee Sosa MD on 9/12/2024 at 5:42 AM          XR CHEST AP PORTABLE  (CPT=71045)    Result Date: 9/11/2024  CONCLUSION:  Expiratory chest without acute cardiopulmonary process.   [facility]  Dictated by (CST): Mustapha Barrera MD on 9/11/2024 at 9:48 PM     Finalized by (CST): Mustapha Barrera MD on 9/11/2024 at 9:48 PM                 Abimael Mcfadden MD  9/12/2024

## 2024-09-12 NOTE — CONSULTS
[facility]  part of [facility]    Report of Consultation    Cammie Nunn Patient Status:  Inpatient    1970 MRN [id number]   Location [facility] 5SW/SE Attending Abimael Mcfadden MD   Hosp Day # 1 PCP Endy Solorzano MD     Date of Admission:  2024  Date of Consult:  2024   Reason for Consultation:   JEM    History of Present Illness:   Patient is a 54 year old female who was admitted to the hospital for Febrile illness:    55 y/o F with h/o autoimmune hepatitis and primary biliary cholangitis  s/p left hip replacement surgery  on  presented to ER with feeling weak and chills and poor PO intake for few  days.  In er with jem and anemia. Denies any new medication except taking asa bid since discharge. Also c/o black stools.    Cr 2.7 mg/dl , co2 16, and hgb 6.1 , wbc 36   VSS  Son at bedside    Past Medical History  Past Medical History:    Chronic obstructive pulmonary disease on long-term inhaled steroid therapy (HCC)    Disorder of liver    autoimmune disease    Essential hypertension    not taking medications    Febrile illness    High blood pressure    off medications since  per patient    High cholesterol    Mirizzi's syndrome (HCC)    Osteoarthritis    Visual impairment       Past Surgical History  Past Surgical History:   Procedure Laterality Date    Cholecystectomy      Colonoscopy N/A 2023    Procedure: COLONOSCOPY;  Surgeon: Jeison Diamond MD;  Location: [facility] ENDOSCOPY    Tonsillectomy         Family History  Family History   Problem Relation Age of Onset    Lipids Mother     Hypertension Mother     Diabetes Mother     Cancer Mother        Social History  Social History     Socioeconomic History    Marital status: Single   Tobacco Use    Smoking status: Never     Passive exposure: Never    Smokeless tobacco: Never   Vaping Use    Vaping status: Never Used   Substance and Sexual Activity    Alcohol use: Not Currently      Comment: Every 1 now and then    Drug use: Never     Social Determinants of Health     Food Insecurity: No Food Insecurity (2024)    Food Insecurity     Food Insecurity: Never true   Transportation Needs: Unmet Transportation Needs (2024)    Transportation Needs     Lack of Transportation: Yes   Housing Stability: Low Risk  (2024)    Housing Stability     Housing Instability: No       Current Medications:  Current Facility-Administered Medications   Medication Dose Route Frequency    mycophenolate mofetil (Cellcept) cap 500 mg  500 mg Oral BID    aspirin DR tab 81 mg  81 mg Oral BID    ursodiol (Actigall) cap 600 mg  600 mg Oral BID    predniSONE (Deltasone) tab 10 mg  10 mg Oral Daily    ondansetron (Zofran) 4 MG/2ML injection 4 mg  4 mg Intravenous Q6H PRN    sodium chloride 0.9% infusion   Intravenous Continuous    zolpidem (Ambien) tab 5 mg  5 mg Oral Nightly PRN    alum-mag hydroxide-simethicone (Maalox) 200-200-20 MG/5ML oral suspension 30 mL  30 mL Oral QID PRN    pantoprazole (Protonix) 40 mg in sodium chloride 0.9% PF 10 mL IV push  40 mg Intravenous Daily    piperacillin-tazobactam (Zosyn) 3.375 g in dextrose 5% 100 mL IVPB-ADDV  3.375 g Intravenous q12h    Vancomycin: PHARMACY DOSING  1 each Intravenous See Admin Instructions (RX holding)    sodium chloride 0.9% infusion   Intravenous Once     Medications Prior to Admission   Medication Sig    predniSONE 10 MG Oral Tab Take 1 tablet (10 mg total) by mouth daily.    aspirin 81 MG Oral Tab EC Take 1 tablet (81 mg total) by mouth in the morning and 1 tablet (81 mg total) before bedtime.    Mycophenolate Mofetil 500 MG Oral Tab Take 1 tablet (500 mg total) by mouth 2 (two) times daily.    [] ursodiol 300 MG Oral Cap Take 2 capsules (600 mg total) by mouth 2 (two) times daily.       Allergies  No Known Allergies    Review of Systems:   GENERAL HEALTH: feels well otherwise  SKIN: denies any unusual skin lesions or rashes  RESPIRATORY:  denies shortness of breath with exertion, no cough, no hemoptysis  CARDIOVASCULAR: denies chest pain on exertion, no palpitations  :  Denies hematuria, dysuria, foamy urine  GI: denies abdominal pain and denies heartburn, no nausea/vomiting/diarrhea  EXT: + edema  NEURO: denies headaches      A comprehensive 12 point review of systems was completed.  Pertinent positives as above and all the rest were negative.     Physical Exam:   /53   Pulse 73   Temp 97.3 °F (36.3 °C) (Oral)   Resp 19   Ht 5' 4\" (1.626 m)   Wt 186 lb 9.6 oz (84.6 kg)   LMP 10/17/2018   SpO2 100%   BMI 32.03 kg/m²      Intake/Output Summary (Last 24 hours) at 9/12/2024 1218  Last data filed at 9/12/2024 0820  Gross per 24 hour   Intake 1461.5 ml   Output 450 ml   Net 1011.5 ml     Wt Readings from Last 1 Encounters:   09/12/24 186 lb 9.6 oz (84.6 kg)       Exam  GENERAL: well developed, well nourished,in no apparent distress  SKIN: no rashes  HEENT: no scleral icterus, moist mucus membranes  NECK: supple,no adenopathy,no bruits  LUNGS: clear to auscultation without crackles or wheezes  CARDIO: RRR without murmur  GI: good BS's,no masses, HSM or tenderness, soft, non-distended, no audible bruits  EXTREMITIES: ++ edema  L>R  NEURO: CN grossly intact, A+O x3  Access      Results:     Laboratory Data:  Recent Labs   Lab 09/11/24 2138 09/12/24  0620   RBC 1.84* 1.93*   HGB 6.1* 6.1*   HCT 18.6* 19.9*   .1* 103.1*   MCH 33.2 31.6   MCHC 32.8 30.7*   RDW 22.8* 21.1*   NEPRELIM 30.97* 24.72*   WBC 36.0* 29.5*   .0 201.0         Recent Labs   Lab 09/11/24 2138 09/12/24  0620   * 98   BUN 72* 57*   CREATSERUM 2.71* 2.77*   CA 7.9* 8.1*    139   K 3.6 3.7    112   CO2 16.0* 14.0*        Imaging:  US VENOUS DOPPLER LEG LEFT - DIAG IMG (CPT=93971)    Result Date: 9/12/2024  CONCLUSION:  Negative for sonographic evidence of deep venous thrombosis in the left lower extremity.    A preliminary report was issued by  the [facility] Radiology teleradiology service. There are no major discrepancies.   Dictated by (CST): eKe Sosa MD on 9/12/2024 at 5:41 AM     Finalized by (CST): Kee Sosa MD on 9/12/2024 at 5:42 AM          XR CHEST AP PORTABLE  (CPT=71045)    Result Date: 9/11/2024  CONCLUSION:  Expiratory chest without acute cardiopulmonary process.   Elm-remote  Dictated by (CST): Mustapha Barrera MD on 9/11/2024 at 9:48 PM     Finalized by (CST): Mustapha Barrera MD on 9/11/2024 at 9:48 PM               Impression/Plan:     Impression:  JEM likely sec to dec renal perfusion bc of profound anemia and poor  po intake . UA is bland and low urine sodium. Doubt HRS. S/p prbc and will continue IVF. Renal US  AG Metabolic acidosis sec to renal failure and dec perfusion. Switch to bicarb drip  Anemia r/o gu bleed. [place] in light of asa use and black stools. S/p prbc. Iron panel ok  AAH/PBC with worsening LFTS and coagulopathy. Gi consulted. Check nh3        Plan:  Change to bicarb drip  Renal US  Prbc  Avoid nephrotoxins  Renally dose all meds  Labs in am      Thank you very much for allowing me to participate in the care of your patient.  If you have any questions, please do not hesitate to contact me.     Lisseth Perez MD  9/12/2024

## 2024-09-12 NOTE — PLAN OF CARE
Patient alert and orientated x4, but confused at times. Patient has delayed responses. Fall precautions in place. Call light in reach.    Problem: Patient Centered Care  Goal: Patient preferences are identified and integrated in the patient's plan of care  Description: Interventions:  - What would you like us to know as we care for you? Lives with son  - Provide timely, complete, and accurate information to patient/family  - Incorporate patient and family knowledge, values, beliefs, and cultural backgrounds into the planning and delivery of care  - Encourage patient/family to participate in care and decision-making at the level they choose  - Honor patient and family perspectives and choices  Outcome: Progressing     Problem: Patient/Family Goals  Goal: Patient/Family Long Term Goal  Description: Patient's Long Term Goal: Free of skin breakdown    Interventions:  - monitor patients skin, keep patient clean and dry  - See additional Care Plan goals for specific interventions  Outcome: Progressing  Goal: Patient/Family Short Term Goal  Description: Patient's Short Term Goal: free of falls    Interventions:   -  fall precautions in place  - See additional Care Plan goals for specific interventions  Outcome: Progressing     Problem: GENITOURINARY - ADULT  Goal: Absence of urinary retention  Description: INTERVENTIONS:  - Assess patient’s ability to void and empty bladder  - Monitor intake/output and perform bladder scan as needed  - Follow urinary retention protocol/standard of care  - Consider collaborating with pharmacy to review patient's medication profile  - Implement strategies to promote bladder emptying  Outcome: Progressing     Problem: METABOLIC/FLUID AND ELECTROLYTES - ADULT  Goal: Hemodynamic stability and optimal renal function maintained  Description: INTERVENTIONS:  - Monitor labs and assess for signs and symptoms of volume excess or deficit  - Monitor intake, output and patient weight  - Monitor urine  specific gravity, serum osmolarity and serum sodium as indicated or ordered  - Monitor response to interventions for patient's volume status, including labs, urine output, blood pressure (other measures as available)  - Encourage oral intake as appropriate  - Instruct patient on fluid and nutrition restrictions as appropriate  Outcome: Progressing     Problem: SKIN/TISSUE INTEGRITY - ADULT  Goal: Skin integrity remains intact  Description: INTERVENTIONS  - Assess and document risk factors for pressure ulcer development  - Assess and document skin integrity  - Monitor for areas of redness and/or skin breakdown  - Initiate interventions, skin care algorithm/standards of care as needed  Outcome: Progressing  Goal: Incision(s), wounds(s) or drain site(s) healing without S/S of infection  Description: INTERVENTIONS:  - Assess and document risk factors for pressure ulcer development  - Assess and document skin integrity  - Assess and document dressing/incision, wound bed, drain sites and surrounding tissue  - Implement wound care per orders  - Initiate isolation precautions as appropriate  - Initiate Pressure Ulcer prevention bundle as indicated  Outcome: Progressing     Problem: MUSCULOSKELETAL - ADULT  Goal: Return mobility to safest level of function  Description: INTERVENTIONS:  - Assess patient stability and activity tolerance for standing, transferring and ambulating w/ or w/o assistive devices  - Assist with transfers and ambulation using safe patient handling equipment as needed  - Ensure adequate protection for wounds/incisions during mobilization  - Obtain PT/OT consults as needed  - Advance activity as appropriate  - Communicate ordered activity level and limitations with patient/family  Outcome: Progressing     Problem: PAIN - ADULT  Goal: Verbalizes/displays adequate comfort level or patient's stated pain goal  Description: INTERVENTIONS:  - Encourage pt to monitor pain and request assistance  - Assess pain  using appropriate pain scale  - Administer analgesics based on type and severity of pain and evaluate response  - Implement non-pharmacological measures as appropriate and evaluate response  - Consider cultural and social influences on pain and pain management  - Manage/alleviate anxiety  - Utilize distraction and/or relaxation techniques  - Monitor for opioid side effects  - Notify MD/LIP if interventions unsuccessful or patient reports new pain  - Anticipate increased pain with activity and pre-medicate as appropriate  Outcome: Progressing     Problem: RISK FOR INFECTION - ADULT  Goal: Absence of fever/infection during anticipated neutropenic period  Description: INTERVENTIONS  - Monitor WBC  - Administer growth factors as ordered  - Implement neutropenic guidelines  Outcome: Progressing     Problem: SAFETY ADULT - FALL  Goal: Free from fall injury  Description: INTERVENTIONS:  - Assess pt frequently for physical needs  - Identify cognitive and physical deficits and behaviors that affect risk of falls.  - [place]ox fall precautions as indicated by assessment.  - Educate pt/family on patient safety including physical limitations  - Instruct pt to call for assistance with activity based on assessment  - Modify environment to reduce risk of injury  - Provide assistive devices as appropriate  - Consider OT/PT consult to assist with strengthening/mobility  - Encourage toileting schedule  Outcome: Progressing     Problem: DISCHARGE PLANNING  Goal: Discharge to home or other facility with appropriate resources  Description: INTERVENTIONS:  - Identify barriers to discharge w/pt and caregiver  - Include patient/family/discharge partner in discharge planning  - Arrange for needed discharge resources and transportation as appropriate  - Identify discharge learning needs (meds, wound care, etc)  - Arrange for interpreters to assist at discharge as needed  - Consider post-discharge preferences of patient/family/discharge  partner  - Complete POLST form as appropriate  - Assess patient's ability to be responsible for managing their own health  - Refer to Case Management Department for coordinating discharge planning if the patient needs post-hospital services based on physician/LIP order or complex needs related to functional status, cognitive ability or social support system  Outcome: Progressing     Problem: CONFUSION  Goal: Confusion, delirium, dementia or psychosis is improved or at baseline  Description: INTERVENTIONS:  - Assess for possible contributors to thought disturbance, including medications, impaired vision or hearing, underlying metabolic abnormalities, dehydration, psychiatric diagnoses, and notify attending MD/LIP  - Cook high risk fall precautions, as indicated  - Provide frequent short contacts to provide reality reorientation, refocusing and direction  - Decrease environmental stimuli, including noise as appropriate  - Monitor and intervene to maintain adequate nutrition, hydration, elimination, sleep and activity  - Consider the need for a sitter if unable to leave patient unattended  - Initiate Spiritual Care consult as indicated  - Consult Pharmacy as needed  Outcome: Progressing

## 2024-09-13 ENCOUNTER — APPOINTMENT (OUTPATIENT)
Dept: CT IMAGING | Facility: HOSPITAL | Age: 54
End: 2024-09-13
Attending: HOSPITALIST
Payer: COMMERCIAL

## 2024-09-13 LAB
ALBUMIN SERPL-MCNC: 2.2 G/DL (ref 3.2–4.8)
ALBUMIN/GLOB SERPL: 1.1 {RATIO} (ref 1–2)
ALP LIVER SERPL-CCNC: 290 U/L
ALT SERPL-CCNC: 136 U/L
AMMONIA PLAS-MCNC: 68 UMOL/L (ref 11–32)
ANION GAP SERPL CALC-SCNC: 11 MMOL/L (ref 0–18)
AST SERPL-CCNC: 192 U/L (ref ?–34)
BASOPHILS # BLD: 0 X10(3) UL (ref 0–0.2)
BASOPHILS # BLD: 0 X10(3) UL (ref 0–0.2)
BASOPHILS NFR BLD: 0 %
BASOPHILS NFR BLD: 0 %
BILIRUB SERPL-MCNC: 26.1 MG/DL (ref 0.3–1.2)
BLOOD TYPE BARCODE: 7300
BLOOD TYPE BARCODE: 7300
BUN BLD-MCNC: 74 MG/DL (ref 9–23)
BUN/CREAT SERPL: 24.7 (ref 10–20)
CALCIUM BLD-MCNC: 7.6 MG/DL (ref 8.7–10.4)
CHLORIDE SERPL-SCNC: 108 MMOL/L (ref 98–112)
CO2 SERPL-SCNC: 21 MMOL/L (ref 21–32)
CREAT BLD-MCNC: 3 MG/DL
DEPRECATED RDW RBC AUTO: 59.8 FL (ref 35.1–46.3)
DEPRECATED RDW RBC AUTO: 60.7 FL (ref 35.1–46.3)
EGFRCR SERPLBLD CKD-EPI 2021: 18 ML/MIN/1.73M2 (ref 60–?)
EOSINOPHIL # BLD: 0 X10(3) UL (ref 0–0.7)
EOSINOPHIL # BLD: 0 X10(3) UL (ref 0–0.7)
EOSINOPHIL NFR BLD: 0 %
EOSINOPHIL NFR BLD: 0 %
ERYTHROCYTE [DISTWIDTH] IN BLOOD BY AUTOMATED COUNT: 20.6 % (ref 11–15)
ERYTHROCYTE [DISTWIDTH] IN BLOOD BY AUTOMATED COUNT: 20.7 % (ref 11–15)
FIBRINOGEN PPP-MCNC: 268 MG/DL (ref 200–480)
GLOBULIN PLAS-MCNC: 2 G/DL (ref 2–3.5)
GLUCOSE BLD-MCNC: 122 MG/DL (ref 70–99)
HCT VFR BLD AUTO: 18.1 %
HCT VFR BLD AUTO: 25.5 %
HEMOCCULT STL QL: POSITIVE
HGB BLD-MCNC: 6.3 G/DL
HGB BLD-MCNC: 8.4 G/DL
INR BLD: 1.54 (ref 0.8–1.2)
LYMPHOCYTES NFR BLD: 0.65 X10(3) UL (ref 1–4)
LYMPHOCYTES NFR BLD: 1.86 X10(3) UL (ref 1–4)
LYMPHOCYTES NFR BLD: 3 %
LYMPHOCYTES NFR BLD: 8 %
MCH RBC QN AUTO: 31.3 PG (ref 26–34)
MCH RBC QN AUTO: 32.5 PG (ref 26–34)
MCHC RBC AUTO-ENTMCNC: 32.9 G/DL (ref 31–37)
MCHC RBC AUTO-ENTMCNC: 34.8 G/DL (ref 31–37)
MCV RBC AUTO: 93.3 FL
MCV RBC AUTO: 95.1 FL
METAMYELOCYTES # BLD: 0.23 X10(3) UL
METAMYELOCYTES # BLD: 0.65 X10(3) UL
METAMYELOCYTES NFR BLD: 1 %
METAMYELOCYTES NFR BLD: 3 %
MONOCYTES # BLD: 0.23 X10(3) UL (ref 0.1–1)
MONOCYTES # BLD: 0.65 X10(3) UL (ref 0.1–1)
MONOCYTES NFR BLD: 1 %
MONOCYTES NFR BLD: 3 %
MYELOCYTES # BLD: 0.22 X10(3) UL
MYELOCYTES NFR BLD: 1 %
NEUTROPHILS # BLD AUTO: 18.6 X10 (3) UL (ref 1.5–7.7)
NEUTROPHILS # BLD AUTO: 19.3 X10 (3) UL (ref 1.5–7.7)
NEUTROPHILS NFR BLD: 88 %
NEUTROPHILS NFR BLD: 89 %
NEUTS BAND NFR BLD: 1 %
NEUTS BAND NFR BLD: 2 %
NEUTS HYPERSEG # BLD: 19.53 X10(3) UL (ref 1.5–7.7)
NEUTS HYPERSEG # BLD: 20.97 X10(3) UL (ref 1.5–7.7)
OSMOLALITY SERPL CALC.SUM OF ELEC: 313 MOSM/KG (ref 275–295)
PLATELET # BLD AUTO: 150 10(3)UL (ref 150–450)
PLATELET # BLD AUTO: 155 10(3)UL (ref 150–450)
PLATELET MORPHOLOGY: NORMAL
PLATELET MORPHOLOGY: NORMAL
POTASSIUM SERPL-SCNC: 3 MMOL/L (ref 3.5–5.1)
POTASSIUM SERPL-SCNC: 3 MMOL/L (ref 3.5–5.1)
PROT SERPL-MCNC: 4.2 G/DL (ref 5.7–8.2)
PROTHROMBIN TIME: 19.5 SECONDS (ref 11.6–14.8)
RBC # BLD AUTO: 1.94 X10(6)UL
RBC # BLD AUTO: 2.68 X10(6)UL
SODIUM SERPL-SCNC: 140 MMOL/L (ref 136–145)
TOTAL CELLS COUNTED BLD: 100
TOTAL CELLS COUNTED BLD: 100
UNIT VOLUME: 350 ML
UNIT VOLUME: 350 ML
WBC # BLD AUTO: 21.7 X10(3) UL (ref 4–11)
WBC # BLD AUTO: 23.3 X10(3) UL (ref 4–11)

## 2024-09-13 PROCEDURE — 99255 IP/OBS CONSLTJ NEW/EST HI 80: CPT | Performed by: INTERNAL MEDICINE

## 2024-09-13 PROCEDURE — 73700 CT LOWER EXTREMITY W/O DYE: CPT | Performed by: HOSPITALIST

## 2024-09-13 PROCEDURE — 99233 SBSQ HOSP IP/OBS HIGH 50: CPT | Performed by: HOSPITALIST

## 2024-09-13 PROCEDURE — 74176 CT ABD & PELVIS W/O CONTRAST: CPT | Performed by: HOSPITALIST

## 2024-09-13 PROCEDURE — 99232 SBSQ HOSP IP/OBS MODERATE 35: CPT | Performed by: INTERNAL MEDICINE

## 2024-09-13 RX ORDER — POTASSIUM CHLORIDE 1500 MG/1
20 TABLET, EXTENDED RELEASE ORAL ONCE
Status: COMPLETED | OUTPATIENT
Start: 2024-09-13 | End: 2024-09-13

## 2024-09-13 RX ORDER — LACTULOSE 10 G/15ML
10 SOLUTION ORAL 3 TIMES DAILY
Status: DISCONTINUED | OUTPATIENT
Start: 2024-09-13 | End: 2024-09-15

## 2024-09-13 RX ORDER — ALBUMIN (HUMAN) 12.5 G/50ML
25 SOLUTION INTRAVENOUS ONCE
Status: COMPLETED | OUTPATIENT
Start: 2024-09-13 | End: 2024-09-13

## 2024-09-13 RX ORDER — SODIUM CHLORIDE 9 MG/ML
INJECTION, SOLUTION INTRAVENOUS CONTINUOUS
Status: DISCONTINUED | OUTPATIENT
Start: 2024-09-13 | End: 2024-09-15

## 2024-09-13 RX ORDER — SODIUM CHLORIDE 9 MG/ML
INJECTION, SOLUTION INTRAVENOUS ONCE
Status: COMPLETED | OUTPATIENT
Start: 2024-09-13 | End: 2024-09-13

## 2024-09-13 RX ORDER — LORAZEPAM 1 MG/1
1 TABLET ORAL EVERY 8 HOURS PRN
Status: DISCONTINUED | OUTPATIENT
Start: 2024-09-13 | End: 2024-09-15

## 2024-09-13 NOTE — TRANSFER CENTER NOTE
[facility] Note:  Reason for transfer:     Continuity and higher level of care    Request initiated by:    Dr Abimael Mcfadden      Active Acute Medical issue:      Acute kidney injury  Likely pre-renal etiology due to poor PO intake and profound anemia  Nephrology has been consulted  Started on bircarb gtt  Avoid nephrotoxins  Renal US ordered     Acute blood loss anemia  Pt on ASA BID, reports black stools of late, consider UGI bleed  Pt has rec'd 2 U PRBCs  Hgb >7  GI has been consulted  Will need endoscopic eval, timing TBD      Metabolic acidosis  Due to renal failure  Started on bicarb gtt  Cont to monitor     Primary biliary cholangitis/Autoimmune hepatitis   Worsening of LFTs, GI on board  Cont to monitor      Leukocytosis  Likely reactive, bcx pending   No fevers      Coagulopathy  Likely secondary to liver pathology, given 1 dose Vit K  INR 2.46     Prophylaxis  Supratherapeutic INR, pathological         Anticipated Transfer Plan:   [facility] called, plan of care discussed with transfer center RN.  Face sheet faxed and copy of imaging has been requested for transport, bedside RN was updated. Patient/family was notified  by the provider and are agreeable to the plan.  [facility] is facilitating coordination of care and will follow.     [facility] #: [phone]

## 2024-09-13 NOTE — CONSULTS
[facility]   Gastroenterology Consultation Note    Cammie Nunn  Patient Status:    Inpatient  Date of Admission:         9/11/2024, Hospital day #2  Attending:   Abimael Mcfadden MD  PCP:     Endy Solorzano MD    Reason for Consultation:  PBC/AIH liver decompensation     History of Present Illness:  Cammie Nunn is a a(n) 54 year old female w/ a history of PBC/AIH cirrhosis, HTN, HLD, recent femur/hip fracture s/p orthopedic surgery 8/28, who presents with fever and generalized weakness and confusion. Two sons in room. States over the past week mom has not been feeling well with reduced appetite, poor energy, fatigue and generalized weakness. She denies abdominal pain, nausea or emesis. No diarrhea. She has been constipated. Bowel movement over the past two days has been dark however. No hematemesis or hematochezia. Fevers at home, none since admission. Also notes lower extremity swelling.     She recently switched from AZA to cellcept after no improvement in LFTs in late June. Since then no improvement seen in LFTs.     History:  Past Medical History:    Chronic obstructive pulmonary disease on long-term inhaled steroid therapy (HCC)    Disorder of liver    autoimmune disease    Essential hypertension    not taking medications    Febrile illness    High blood pressure    off medications since 2022 per patient    High cholesterol    Mirizzi's syndrome (HCC)    Osteoarthritis    Visual impairment     Past Surgical History:   Procedure Laterality Date    Cholecystectomy      Colonoscopy N/A 12/12/2023    Procedure: COLONOSCOPY;  Surgeon: Jeison Diamond MD;  Location: [facility] ENDOSCOPY    Tonsillectomy       Family History   Problem Relation Age of Onset    Lipids Mother     Hypertension Mother     Diabetes Mother     Cancer Mother       reports that she has never smoked. She has never been exposed to tobacco smoke. She has never used smokeless tobacco. She reports  that she does not currently use alcohol. She reports that she does not use drugs.    Allergies:  No Known Allergies    Medications:    Current Facility-Administered Medications:     rifAXIMin (Xifaxan) tab 550 mg, 550 mg, Oral, BID    lactulose (CHRONULAC) 10 GM/15ML solution 10 g, 10 g, Oral, TID    pantoprazole (Protonix) 40 mg in sodium chloride 0.9% PF 10 mL IV push, 40 mg, Intravenous, Q12H    sodium chloride 0.9% infusion, , Intravenous, Continuous    aspirin DR tab 81 mg, 81 mg, Oral, BID    ursodiol (Actigall) cap 600 mg, 600 mg, Oral, BID    predniSONE (Deltasone) tab 10 mg, 10 mg, Oral, Daily    ondansetron (Zofran) 4 MG/2ML injection 4 mg, 4 mg, Intravenous, Q6H PRN    zolpidem (Ambien) tab 5 mg, 5 mg, Oral, Nightly PRN    alum-mag hydroxide-simethicone (Maalox) 200-200-20 MG/5ML oral suspension 30 mL, 30 mL, Oral, QID PRN    piperacillin-tazobactam (Zosyn) 3.375 g in dextrose 5% 100 mL IVPB-ADDV, 3.375 g, Intravenous, q12h    Vancomycin: PHARMACY DOSING, 1 each, Intravenous, See Admin Instructions (RX holding)    sodium chloride 0.9% infusion, , Intravenous, Once  Medications Prior to Admission   Medication Sig Dispense Refill Last Dose    predniSONE 10 MG Oral Tab Take 1 tablet (10 mg total) by mouth daily.   2024 at 1200    aspirin 81 MG Oral Tab EC Take 1 tablet (81 mg total) by mouth in the morning and 1 tablet (81 mg total) before bedtime. 60 tablet 0 2024 at 1200    Mycophenolate Mofetil 500 MG Oral Tab Take 1 tablet (500 mg total) by mouth 2 (two) times daily.   2024 at 1200    [] ursodiol 300 MG Oral Cap Take 2 capsules (600 mg total) by mouth 2 (two) times daily. 360 capsule 3        Review of Systems:  CONSTITUTIONAL:  + for fevers, rigors  EYES:  negative for diplopia   RESPIRATORY:  negative for severe shortness of breath  CARDIOVASCULAR:  negative for crushing sub-sternal chest pain  GASTROINTESTINAL:  see HPI  GENITOURINARY:  negative for dysuria or gross  hematuria  INTEGUMENT/BREAST:  SKIN:  negative for jaundice   ALLERGIC/IMMUNOLOGIC:  negative for hay fever  ENDOCRINE:  negative for cold intolerance and heat intolerance  MUSCULOSKELETAL:  negative for joint effusion/severe erythema  NEURO: negative for dizziness or loss of conscious or paresthesias  BEHAVIOR/PSYCH:  negative for psychotic behavior    Physical Exam:    Blood pressure 110/54, pulse 71, temperature 97.5 °F (36.4 °C), temperature source Oral, resp. rate 18, height 5' 4\" (1.626 m), weight 186 lb 9.6 oz (84.6 kg), last menstrual period 10/17/2018, SpO2 98%. Body mass index is 32.03 kg/m².    General: awake, alert and oriented, no acute distress  HEENT: moist mucus membranes  PULM: no conversational dyspnea  CARDIOVASCULAR: regular rate and rhythm, the extremities are warm and well perfused  GI: soft, non-tender, non-distended, + BS, no rebound/guarding   EXTREMITIES: trace edema, moving all extremities  SKIN: jaundice  NEURO: answers appropriately    Laboratory Data:  Lab Results   Component Value Date    WBC 21.7 09/13/2024    HGB 8.4 09/13/2024    HCT 25.5 09/13/2024    .0 09/13/2024    CREATSERUM 3.00 09/13/2024    BUN 74 09/13/2024     09/13/2024    K 3.0 09/13/2024     09/13/2024    CO2 21.0 09/13/2024     09/13/2024    CA 7.6 09/13/2024    ALB 2.2 09/13/2024    ALKPHO 290 09/13/2024    BILT 26.1 09/13/2024    TP 4.2 09/13/2024     09/13/2024     09/13/2024    INR 1.54 09/13/2024       Imaging:  US VENOUS DOPPLER LEG LEFT - DIAG IMG (CPT=93971)    Result Date: 9/12/2024  CONCLUSION:  Negative for sonographic evidence of deep venous thrombosis in the left lower extremity.    A preliminary report was issued by the [facility] teleradiology service. There are no major discrepancies.   Dictated by (CST): Kee Sosa MD on 9/12/2024 at 5:41 AM     Finalized by (CST): Kee Sosa MD on 9/12/2024 at 5:42 AM          XR CHEST AP PORTABLE   (CPT=71045)    Result Date: 9/11/2024  CONCLUSION:  Expiratory chest without acute cardiopulmonary process.   [facility]  Dictated by (CST): Mustapha Barrera MD on 9/11/2024 at 9:48 PM     Finalized by (CST): Mustapha Barrera MD on 9/11/2024 at 9:48 PM           Assessment & Plan   Cammie Nunn is a a(n) 54 year old female w/ a history of PBC/AIH cirrhosis, HTN, HLD, recent femur/hip fracture s/p orthopedic surgery 8/28, who presents with fever and generalized weakness and confusion. Labs on admission notable for TB 26, , , . CRP and procalcitionon high. WBC 36, hgb 6.1 s/p 3 unit prbc to hgb 8.4 this morning, one reported dark stool. INR 5.72 s/p vitamin K now 1.54.     # Leukocytosis with fever at home  - concern for infection, sepsis but source not clear. Blood cultures ngtd, minimal ascites on imaging,  she had a recent MRCP outpatient prior to surgery which did not show any overt biliary issue. She has no abdominal pain at present, cannot entirely exclude cholangitis but felt to be less likely. However as source remains unclear, will obtain MRCP to assess biliary system  - appreciate ID evaluation  - continue empiric IV vanco and zosyn    # Acute on chronic anemia   - likely multifactorial. Recent hip surgery related anemia. Initially coagulopathic to INR 5 s/p vitamin K improved to 1.52  - monitor for overt bleeding. Dark stool today likely related to coagulopathy related mucosal injury which should be self limited and improve with correction of coagulopathy                                                                - empiric PPI  - reserve endoscopic evaluation for melena or significant anemia not responsive to transfusion     # mild encephalopathy  - may be triggered by sepsis vs worsening liver disease  - start lactulose + xifaxan    # PBC/AIH advanced fibrosis/cirrhosis with concern for liver decompensation after recent hip surgery  - high meld driven by bilirubin  and creatinine. Hyperbilirubinemia may be multifactorial from cholestasis of sepsis, uncontrolled underlying autoimmune hepatitis. CT does not suggest acute biliary obstruction but as above will obtain MRCP  - She is not in liver failure as the INR responded to vitamin K and fibrinogen is preserved at 268.   - hold cellcept given concern for sepsis, continue low dose prednisone to prevent adrenal insufficiency  - discussed with Dr Ness primary hepatologist today; low threshold for transfer if worsening clinical status     D/w Dr Lesa Dennis Ma, MD  [facility] Gastroenterology  9/13/2024    This note was partially prepared using Dragon Medical voice recognition dictation software. As a result, errors may occur. When identified, these errors have been corrected. While every attempt is made to correct errors during dictation, discrepancies may still exist.

## 2024-09-13 NOTE — OCCUPATIONAL THERAPY NOTE
Attempted to see pt for OT today. Pt has low HgB and receiving blood transfusion per RN. Will re-attempt when pt is stable.    Danielle Juan, OTR/L

## 2024-09-13 NOTE — CM/SW NOTE
09/13/24 1100   / Referral Data   Referral Source Physician   Reason for Referral Discharge planning;Financial issues   Informant Patient;Son   Medical Hx   Does patient have an established PCP? Yes  (Endy Solorzano)   Patient Info   Patient's Current Mental Status at Time of Assessment Alert;Oriented   Patient's Home Environment House   Number of Levels in Home 2   Number of Stair in Home Pt stays on 1st floor   Patient lives with Son   Patient Status Prior to Admission   Independent with ADLs and Mobility Yes   Services in place prior to admission Other (comment)  ([facility] for home PT)   Discharge Needs   Anticipated D/C needs To be determined     Pt discussed during nursing rounds. Dx fever, cultures pending, + occult, GI to be consulted, recent MALCOLM at [facility]. Home w/son, independent w/walker prior to admission. Current /[facility] for home PT. PTY/OT evals ordered for later today.    1135: SDOH received for transportation needs. Transportation resources added to AVS.    1325: RN notified  that MD is calling transfer center RN to coordinate transfer to [facility] for a higher level of care.    Plan: Transfer to [facility] pending bed availability.    / to remain available for support and/or discharge planning.     ROSALIA Alarcon    [phone]

## 2024-09-13 NOTE — PROGRESS NOTES
[facility]  part of [facility]    Progress Note    Cammie Nunn Patient Status:  Inpatient    1970 MRN [id number]   Location [facility] 5SW/SE Attending Abimael Mcfadden MD   Hosp Day # 2 PCP Endy Solorzano MD       Subjective:   Cammie Nunn is a(n) 54 year old female who I am seeing for JEM    No overnight issues  Denies any shortness of breath  Lethargic    2 sons at bedside      Objective:   /54 (BP Location: Left arm)   Pulse 71   Temp 97.5 °F (36.4 °C) (Oral)   Resp 18   Ht 5' 4\" (1.626 m)   Wt 186 lb 9.6 oz (84.6 kg)   LMP 10/17/2018   SpO2 98%   BMI 32.03 kg/m²      Intake/Output Summary (Last 24 hours) at 2024 1440  Last data filed at 2024 1430  Gross per 24 hour   Intake 3767.09 ml   Output 450 ml   Net 3317.09 ml     Wt Readings from Last 1 Encounters:   24 186 lb 9.6 oz (84.6 kg)       Exam  Vital signs: Blood pressure 110/54, pulse 71, temperature 97.5 °F (36.4 °C), temperature source Oral, resp. rate 18, height 5' 4\" (1.626 m), weight 186 lb 9.6 oz (84.6 kg), last menstrual period 10/17/2018, SpO2 98%.    General: No acute distress.   HEENT: Moist mucous membranes. EOMI. PERRLA  Neck:  No JVD.   Respiratory: Clear to auscultation bilaterally.    Cardiovascular: S1, S2.  Regular rate and rhythm.   Abdomen: Soft, nontender, nondistended.    Neurologic: No focal neurological deficits.  Musculoskeletal: Full range of motion of all extremities.  Edema present  Access:    Results:     Recent Labs   Lab 24  0620 24  1216 24  0544 24  1308   RBC 1.93*  --  1.94* 2.68*   HGB 6.1* 7.4* 6.3* 8.4*   HCT 19.9* 21.5* 18.1* 25.5*   .1*  --  93.3 95.1   MCH 31.6  --  32.5 31.3   MCHC 30.7*  --  34.8 32.9   RDW 21.1*  --  20.7* 20.6*   NEPRELIM 24.72*  --  19.30* 18.60*   WBC 29.5*  --  23.3* 21.7*   .0  --  155.0 150.0         Recent Labs   Lab 24  2138 24  0620 24  0544    * 98 122*   BUN 72* 57* 74*   CREATSERUM 2.71* 2.77* 3.00*   CA 7.9* 8.1* 7.6*    139 140   K 3.6 3.7 3.0*    112 108   CO2 16.0* 14.0* 21.0          US VENOUS DOPPLER LEG LEFT - DIAG IMG (CPT=93971)    Result Date: 9/12/2024  CONCLUSION:  Negative for sonographic evidence of deep venous thrombosis in the left lower extremity.    A preliminary report was issued by the [facility] teleradiology service. There are no major discrepancies.   Dictated by (CST): Kee Sosa MD on 9/12/2024 at 5:41 AM     Finalized by (CST): Kee Sosa MD on 9/12/2024 at 5:42 AM          XR CHEST AP PORTABLE  (CPT=71045)    Result Date: 9/11/2024  CONCLUSION:  Expiratory chest without acute cardiopulmonary process.   [facility]  Dictated by (CST): Mustapha Barrera MD on 9/11/2024 at 9:48 PM     Finalized by (CST): Mustapha Barrera MD on 9/11/2024 at 9:48 PM           Assessment and Plan:     55 y/o F with h/o autoimmune hepatitis and primary biliary cholangitis  s/p left hip replacement surgery  on 8/28 presented to ER with feeling weak and chills and poor PO intake for few  days.  In er with jem and anemia. Denies any new medication except taking asa bid since discharge. Also c/o black stools.     Cr 2.7 mg/dl , co2 16, and hgb 6.1 , wbc 36   VSS  Sons at bedside    Impression:    JEM likely sec to dec renal perfusion bc of profound anemia and poor  po intake . UA is bland and low urine sodium. Questionable HRS. S/p prbc and will continue IVF. Renal US unremarkable  AG Metabolic acidosis sec to renal failure and dec perfusion. Switch to bicarb drip--> improved will switch to normal saline  Anemia r/o gi bleed. [place] in light of asa use and black stools. S/p prbc. Iron panel ok  AAH/PBC with worsening LFTS and coagulopathy. Gi consulted.       Plan:    Unfortunately creatinine is getting worse, no improvement with IV fluids, PVR with mild retention will place Garza for accurate I's and O's  Will also  give albumin  Blood pressure been okay so far so no need for midodrine  Discussed with patient and two sons in detail regarding worsening kidney function and possible need for dialysis if no improvement seen in few days.  Would recommend transfer to tertiary care.    Thank you very much for allowing me to participate in the care of your patient.  If you have any questions, please do not hesitate to contact me.     Lisseth Perez MD  9/13/2024

## 2024-09-13 NOTE — PLAN OF CARE
No acute changes. Fall precautions in place. Call light in reach.     Problem: Patient Centered Care  Goal: Patient preferences are identified and integrated in the patient's plan of care  Description: Interventions:  - What would you like us to know as we care for you? None stated  - Provide timely, complete, and accurate information to patient/family  - Incorporate patient and family knowledge, values, beliefs, and cultural backgrounds into the planning and delivery of care  - Encourage patient/family to participate in care and decision-making at the level they choose  - Honor patient and family perspectives and choices  Outcome: Progressing     Problem: Patient/Family Goals  Goal: Patient/Family Long Term Goal  Description: Patient's Long Term Goal: free of falls    Interventions:  - fall precautions in place  - See additional Care Plan goals for specific interventions  Outcome: Progressing  Goal: Patient/Family Short Term Goal  Description: Patient's Short Term Goal: free of pain    Interventions:   - monitor pain score  - See additional Care Plan goals for specific interventions  Outcome: Progressing     Problem: GENITOURINARY - ADULT  Goal: Absence of urinary retention  Description: INTERVENTIONS:  - Assess patient’s ability to void and empty bladder  - Monitor intake/output and perform bladder scan as needed  - Follow urinary retention protocol/standard of care  - Consider collaborating with pharmacy to review patient's medication profile  - Implement strategies to promote bladder emptying  Outcome: Progressing     Problem: METABOLIC/FLUID AND ELECTROLYTES - ADULT  Goal: Hemodynamic stability and optimal renal function maintained  Description: INTERVENTIONS:  - Monitor labs and assess for signs and symptoms of volume excess or deficit  - Monitor intake, output and patient weight  - Monitor urine specific gravity, serum osmolarity and serum sodium as indicated or ordered  - Monitor response to interventions  for patient's volume status, including labs, urine output, blood pressure (other measures as available)  - Encourage oral intake as appropriate  - Instruct patient on fluid and nutrition restrictions as appropriate  Outcome: Progressing     Problem: SKIN/TISSUE INTEGRITY - ADULT  Goal: Skin integrity remains intact  Description: INTERVENTIONS  - Assess and document risk factors for pressure ulcer development  - Assess and document skin integrity  - Monitor for areas of redness and/or skin breakdown  - Initiate interventions, skin care algorithm/standards of care as needed  Outcome: Progressing  Goal: Incision(s), wounds(s) or drain site(s) healing without S/S of infection  Description: INTERVENTIONS:  - Assess and document risk factors for pressure ulcer development  - Assess and document skin integrity  - Assess and document dressing/incision, wound bed, drain sites and surrounding tissue  - Implement wound care per orders  - Initiate isolation precautions as appropriate  - Initiate Pressure Ulcer prevention bundle as indicated  Outcome: Progressing     Problem: MUSCULOSKELETAL - ADULT  Goal: Return mobility to safest level of function  Description: INTERVENTIONS:  - Assess patient stability and activity tolerance for standing, transferring and ambulating w/ or w/o assistive devices  - Assist with transfers and ambulation using safe patient handling equipment as needed  - Ensure adequate protection for wounds/incisions during mobilization  - Obtain PT/OT consults as needed  - Advance activity as appropriate  - Communicate ordered activity level and limitations with patient/family  Outcome: Progressing     Problem: PAIN - ADULT  Goal: Verbalizes/displays adequate comfort level or patient's stated pain goal  Description: INTERVENTIONS:  - Encourage pt to monitor pain and request assistance  - Assess pain using appropriate pain scale  - Administer analgesics based on type and severity of pain and evaluate response  -  Implement non-pharmacological measures as appropriate and evaluate response  - Consider cultural and social influences on pain and pain management  - Manage/alleviate anxiety  - Utilize distraction and/or relaxation techniques  - Monitor for opioid side effects  - Notify MD/LIP if interventions unsuccessful or patient reports new pain  - Anticipate increased pain with activity and pre-medicate as appropriate  Outcome: Progressing     Problem: RISK FOR INFECTION - ADULT  Goal: Absence of fever/infection during anticipated neutropenic period  Description: INTERVENTIONS  - Monitor WBC  - Administer growth factors as ordered  - Implement neutropenic guidelines  Outcome: Progressing     Problem: SAFETY ADULT - FALL  Goal: Free from fall injury  Description: INTERVENTIONS:  - Assess pt frequently for physical needs  - Identify cognitive and physical deficits and behaviors that affect risk of falls.  - Wittmann fall precautions as indicated by assessment.  - Educate pt/family on patient safety including physical limitations  - Instruct pt to call for assistance with activity based on assessment  - Modify environment to reduce risk of injury  - Provide assistive devices as appropriate  - Consider OT/PT consult to assist with strengthening/mobility  - Encourage toileting schedule  Outcome: Progressing     Problem: DISCHARGE PLANNING  Goal: Discharge to home or other facility with appropriate resources  Description: INTERVENTIONS:  - Identify barriers to discharge w/pt and caregiver  - Include patient/family/discharge partner in discharge planning  - Arrange for needed discharge resources and transportation as appropriate  - Identify discharge learning needs (meds, wound care, etc)  - Arrange for interpreters to assist at discharge as needed  - Consider post-discharge preferences of patient/family/discharge partner  - Complete POLST form as appropriate  - Assess patient's ability to be responsible for managing their own  health  - Refer to Case Management Department for coordinating discharge planning if the patient needs post-hospital services based on physician/LIP order or complex needs related to functional status, cognitive ability or social support system  Outcome: Progressing     Problem: CONFUSION  Goal: Confusion, delirium, dementia or psychosis is improved or at baseline  Description: INTERVENTIONS:  - Assess for possible contributors to thought disturbance, including medications, impaired vision or hearing, underlying metabolic abnormalities, dehydration, psychiatric diagnoses, and notify attending MD/LIP  - Hoffman high risk fall precautions, as indicated  - Provide frequent short contacts to provide reality reorientation, refocusing and direction  - Decrease environmental stimuli, including noise as appropriate  - Monitor and intervene to maintain adequate nutrition, hydration, elimination, sleep and activity  - Consider the need for a sitter if unable to leave patient unattended  - Initiate Spiritual Care consult as indicated  - Consult Pharmacy as needed  Outcome: Progressing

## 2024-09-13 NOTE — PLAN OF CARE
Report received from SARA Arrieta. JUSTO Alfaro. Pt received 1 unit of blood today. Potassium replaced. Call light within reach. Safety precautions in place. Plan: Transfer to [facility] pending bed availability.   Problem: Patient Centered Care  Goal: Patient preferences are identified and integrated in the patient's plan of care  Description: Interventions:  - What would you like us to know as we care for you? Home w/ son   - Provide timely, complete, and accurate information to patient/family  - Incorporate patient and family knowledge, values, beliefs, and cultural backgrounds into the planning and delivery of care  - Encourage patient/family to participate in care and decision-making at the level they choose  - Honor patient and family perspectives and choices  9/13/2024 1620 by Dayana Bansal RN  Outcome: Progressing  9/13/2024 1618 by Dayana Bansal RN  Outcome: Progressing     Problem: Patient/Family Goals  Goal: Patient/Family Long Term Goal  Description: Patient's Long Term Goal: Go home better overall     Interventions:  - Transfer to [facility] pending bed availability   - See additional Care Plan goals for specific interventions  9/13/2024 1620 by Dayana Bansal RN  Outcome: Progressing  9/13/2024 1618 by Dayana Bansal RN  Outcome: Progressing  Goal: Patient/Family Short Term Goal  Description: Patient's Short Term Goal: Figure out what I going on     Interventions:   - Blood work  -ID, GI, ortho to see   - See additional Care Plan goals for specific interventions  9/13/2024 1620 by Dayana Bansal RN  Outcome: Progressing  9/13/2024 1618 by Dayana Bansal RN  Outcome: Progressing     Problem: GENITOURINARY - ADULT  Goal: Absence of urinary retention  Description: INTERVENTIONS:  - Assess patient’s ability to void and empty bladder  - Monitor intake/output and perform bladder scan as needed  - Follow urinary retention protocol/standard of care  - Consider collaborating with pharmacy to review  patient's medication profile  - Implement strategies to promote bladder emptying  9/13/2024 1620 by Dayana Bansal RN  Outcome: Progressing  9/13/2024 1618 by Dayana Bansal RN  Outcome: Progressing     Problem: METABOLIC/FLUID AND ELECTROLYTES - ADULT  Goal: Hemodynamic stability and optimal renal function maintained  Description: INTERVENTIONS:  - Monitor labs and assess for signs and symptoms of volume excess or deficit  - Monitor intake, output and patient weight  - Monitor urine specific gravity, serum osmolarity and serum sodium as indicated or ordered  - Monitor response to interventions for patient's volume status, including labs, urine output, blood pressure (other measures as available)  - Encourage oral intake as appropriate  - Instruct patient on fluid and nutrition restrictions as appropriate  9/13/2024 1620 by Dayana Bansal RN  Outcome: Progressing  9/13/2024 1618 by Dayana Bansal RN  Outcome: Progressing     Problem: SKIN/TISSUE INTEGRITY - ADULT  Goal: Skin integrity remains intact  Description: INTERVENTIONS  - Assess and document risk factors for pressure ulcer development  - Assess and document skin integrity  - Monitor for areas of redness and/or skin breakdown  - Initiate interventions, skin care algorithm/standards of care as needed  9/13/2024 1620 by Dayana Bansal RN  Outcome: Progressing  9/13/2024 1618 by Dayana Bansal RN  Outcome: Progressing  Goal: Incision(s), wounds(s) or drain site(s) healing without S/S of infection  Description: INTERVENTIONS:  - Assess and document risk factors for pressure ulcer development  - Assess and document skin integrity  - Assess and document dressing/incision, wound bed, drain sites and surrounding tissue  - Implement wound care per orders  - Initiate isolation precautions as appropriate  - Initiate Pressure Ulcer prevention bundle as indicated  9/13/2024 1620 by Dayana Bansal RN  Outcome: Progressing  9/13/2024 1618 by Dayana Bansal  RN  Outcome: Progressing     Problem: MUSCULOSKELETAL - ADULT  Goal: Return mobility to safest level of function  Description: INTERVENTIONS:  - Assess patient stability and activity tolerance for standing, transferring and ambulating w/ or w/o assistive devices  - Assist with transfers and ambulation using safe patient handling equipment as needed  - Ensure adequate protection for wounds/incisions during mobilization  - Obtain PT/OT consults as needed  - Advance activity as appropriate  - Communicate ordered activity level and limitations with patient/family  9/13/2024 1620 by Dayana Bansal RN  Outcome: Progressing  9/13/2024 1618 by Dayana Bansal RN  Outcome: Progressing     Problem: PAIN - ADULT  Goal: Verbalizes/displays adequate comfort level or patient's stated pain goal  Description: INTERVENTIONS:  - Encourage pt to monitor pain and request assistance  - Assess pain using appropriate pain scale  - Administer analgesics based on type and severity of pain and evaluate response  - Implement non-pharmacological measures as appropriate and evaluate response  - Consider cultural and social influences on pain and pain management  - Manage/alleviate anxiety  - Utilize distraction and/or relaxation techniques  - Monitor for opioid side effects  - Notify MD/LIP if interventions unsuccessful or patient reports new pain  - Anticipate increased pain with activity and pre-medicate as appropriate  9/13/2024 1620 by Dayana Bansal RN  Outcome: Progressing  9/13/2024 1618 by Dayana Banasl RN  Outcome: Progressing     Problem: RISK FOR INFECTION - ADULT  Goal: Absence of fever/infection during anticipated neutropenic period  Description: INTERVENTIONS  - Monitor WBC  - Administer growth factors as ordered  - Implement neutropenic guidelines  9/13/2024 1620 by Dayana Bansal RN  Outcome: Progressing  9/13/2024 1618 by Dayana Bansal RN  Outcome: Progressing     Problem: SAFETY ADULT - FALL  Goal: Free from fall  injury  Description: INTERVENTIONS:  - Assess pt frequently for physical needs  - Identify cognitive and physical deficits and behaviors that affect risk of falls.  - [place] fall precautions as indicated by assessment.  - Educate pt/family on patient safety including physical limitations  - Instruct pt to call for assistance with activity based on assessment  - Modify environment to reduce risk of injury  - Provide assistive devices as appropriate  - Consider OT/PT consult to assist with strengthening/mobility  - Encourage toileting schedule  9/13/2024 1620 by Dayana Bansal RN  Outcome: Progressing  9/13/2024 1618 by Dayana Bansal RN  Outcome: Progressing     Problem: DISCHARGE PLANNING  Goal: Discharge to home or other facility with appropriate resources  Description: INTERVENTIONS:  - Identify barriers to discharge w/pt and caregiver  - Include patient/family/discharge partner in discharge planning  - Arrange for needed discharge resources and transportation as appropriate  - Identify discharge learning needs (meds, wound care, etc)  - Arrange for interpreters to assist at discharge as needed  - Consider post-discharge preferences of patient/family/discharge partner  - Complete POLST form as appropriate  - Assess patient's ability to be responsible for managing their own health  - Refer to Case Management Department for coordinating discharge planning if the patient needs post-hospital services based on physician/LIP order or complex needs related to functional status, cognitive ability or social support system  9/13/2024 1620 by Dayana Bansal RN  Outcome: Progressing  9/13/2024 1618 by Dayana Bansal RN  Outcome: Progressing     Problem: CONFUSION  Goal: Confusion, delirium, dementia or psychosis is improved or at baseline  Description: INTERVENTIONS:  - Assess for possible contributors to thought disturbance, including medications, impaired vision or hearing, underlying metabolic abnormalities,  dehydration, psychiatric diagnoses, and notify attending MD/LIP  - [place] high risk fall precautions, as indicated  - Provide frequent short contacts to provide reality reorientation, refocusing and direction  - Decrease environmental stimuli, including noise as appropriate  - Monitor and intervene to maintain adequate nutrition, hydration, elimination, sleep and activity  - Consider the need for a sitter if unable to leave patient unattended  - Initiate Spiritual Care consult as indicated  - Consult Pharmacy as needed  9/13/2024 1620 by Dayana Bansal, RN  Outcome: Progressing  9/13/2024 1618 by Dayana Bansal, RN  Outcome: Progressing

## 2024-09-13 NOTE — PAYOR COMM NOTE
9/11-9/12  NO NOTES FOR 9/13 AT THIS ITME  ADMISSION REVIEW     Payor: [facility] OUT OF STATE PPO  Subscriber #:  [id number]  Authorization Number: [id number]    Admit date: 9/11/24  Admit time: 11:59 PM       REVIEW DOCUMENTATION:     ED Provider Notes        ED Provider Notes signed by Nick Conway MD at 9/11/2024 10:54 PM       Author: Nick Conway MD Service: -- Author Type: Physician    Filed: 9/11/2024 10:54 PM Date of Service: 9/11/2024  8:39 PM Status: Signed    : Nick Conway MD (Physician)           Patient Seen in: [facility] Emergency Department      History     Chief Complaint   Patient presents with    Post-Op     Stated Complaint: Post-Op Problem; Weakness    Subjective:   HPI    Patient is a 54-year-old female with a history of autoimmune hepatitis and cholangitis she had a hip replacement surgery on 8/28/2024.  She states she has been doing well with no pain over the last 2 or 3 days she is felt weaker and had some tactile fevers and felt chilled.  She denies sore throat or runny nose she denies cough she denies abdominal pain she states she might have some urinary frequency.  She has no pain in the hip the wound is without redness warmth or swelling    Objective:   Past Medical History:    Chronic obstructive pulmonary disease on long-term inhaled steroid therapy (HCC)    Disorder of liver    autoimmune disease    Essential hypertension    not taking medications    High blood pressure    off medications since 2022 per patient    High cholesterol    Mirizzi's syndrome (HCC)    Osteoarthritis    Visual impairment              Past Surgical History:   Procedure Laterality Date    Cholecystectomy      Colonoscopy N/A 12/12/2023    Procedure: COLONOSCOPY;  Surgeon: Jeison Diamond MD;  Location: [facility] ENDOSCOPY    Tonsillectomy                  Social History     Socioeconomic History    Marital status: Single   Tobacco Use    Smoking status: Never     Passive exposure: Never    Smokeless  tobacco: Never   Vaping Use    Vaping status: Never Used   Substance and Sexual Activity    Alcohol use: Not Currently     Comment: Every 1 now and then    Drug use: Never     Social Determinants of Health     Food Insecurity: No Food Insecurity (8/28/2024)    Food Insecurity     Food Insecurity: Never true   Transportation Needs: No Transportation Needs (8/28/2024)    Transportation Needs     Lack of Transportation: No   Housing Stability: Low Risk  (8/28/2024)    Housing Stability     Housing Instability: No              Review of Systems    Positive for stated Chief Complaint: Post-Op    Other systems are as noted in HPI.  Constitutional and vital signs reviewed.      All other systems reviewed and negative except as noted above.    Physical Exam     ED Triage Vitals [09/11/24 2025]   /70   Pulse 79   Resp 18   Temp 97.9 °F (36.6 °C)   Temp src Temporal   SpO2 100 %   O2 Device None (Room air)       Current Vitals:   Vital Signs  BP: 112/70  Pulse: 79  Resp: 18  Temp: 97.9 °F (36.6 °C)  Temp src: Temporal  MAP (mmHg): 84    Oxygen Therapy  SpO2: 100 %  O2 Device: None (Room air)            Physical Exam    Constitutional: Oriented to person, place, and time. Appears well-developed and well-nourished.   HEENT:   Head: Normocephalic and atraumatic.   Right Ear: External ear normal.   Left Ear: External ear normal.   Nose: Nose normal.   Mouth/Throat: Oropharynx is clear and moist.   Eyes: Conjunctivae and EOM are normal. Pupils are equal, round, and reactive to light.   Neck: Neck supple.   Cardiovascular: Normal rate, regular rhythm, normal heart sounds and intact distal pulses.    Pulmonary/Chest: Effort normal and breath sounds normal. No respiratory distress.   Abdominal: Soft. Bowel sounds are normal. Exhibits no distension and no mass. There is no tenderness. There is no rebound and no guarding.   Musculoskeletal: Left home left hip wound is clean and dry.  No signs of infection.  Left lower extremity  is edematous.  No redness or warmth no tendernessLymphadenopathy: No cervical adenopathy.   Neurological: Alert and oriented to person, place, and time. Normal reflexes. No cranial nerve deficit. No motor os sensory defecits noted Coordination normal.   Skin: Skin is warm and dry.   Psychiatric: Normal mood and affect. Behavior is normal. Judgment and thought content normal.   Nursing note and vitals reviewed.        ED Course   Labs Reviewed - No data to display                 MDM      Use of independent historian: Patient's 3 sons are in the room and help with history.    I personally reviewed and interpreted the images : No infiltrate seen on chest x-ray no DVT seen on    No results found.    Vitals:    09/11/24 2025   BP: 112/70   Pulse: 79   Resp: 18   Temp: 97.9 °F (36.6 °C)   TempSrc: Temporal   SpO2: 100%   Weight: 77.1 kg   Height: 162.6 cm (5' 4\")     *I personally reviewed and interpreted all ED vitals.    Pulse Ox: 100%, Room air, Normal     EKG interpretation above independently interpreted by me    Monitor Interpretation:   normal sinus rhythm independently interpreted by me    Differential Diagnosis/ Diagnostic Considerations: Fever and patient now 2 weeks postop.  Consider UTI consider Covid consider postop hip infection    Medical Record Review: I personally reviewed available prior medical records for any recent pertinent discharge summaries, testing, and procedures and reviewed those reports and found notes from primary care doctor riya ASHRAF reviewed.  Past medical history of primary biliary cholangitis autoimmune hepatitis medication list reviewed on aspirin for DVT prophylaxis.    Complicating Factors: The patient already has primary biliary cholangitis autoimmune hepatitis recent hip surgery.  Which contribute to the complexity of this ED evaluation.    Social determinants of health:    Prescription drug management:      Shared Decision Making:    ED Course: Workup findings shared with patient  and family.  Type and screen sent will likely transfuse.  Awaiting urine patient refused straight cath.  Leukocytosis noted blood cultures drawn and antibiotics initiated I did speak with the hospitalist will admit.  Still awaiting urine    Discussion of management with other healthcare providers:    Condition upon leaving the department: Stable        I spent a total of 45 minutes of critical care time in obtaining history, performing a physical exam, bedside monitoring of interventions, collecting and interpreting tests and discussion with consultants but not including time spent performing procedures.                             MDM    Disposition and Plan     Clinical Impression:  No diagnosis found.     Disposition:  There is no disposition on file for this visit.  There is no disposition time on file for this visit.    Follow-up:  No follow-up provider specified.        Medications Prescribed:  Current Discharge Medication List                                           Signed by Nick Conway MD on 9/11/2024 10:54 PM         MEDICATIONS ADMINISTERED IN LAST 1 DAY:  Transfuse RBC       Date Action Dose Route User    9/13/2024 0931 New Bag (none) Intravenous Monisha Bustillos RN          aspirin DR tab 81 mg       Date Action Dose Route User    9/13/2024 0912 Given 81 mg Oral Monisha Bustillos RN    9/12/2024 2107 Given 81 mg Oral Lili Ortega RN          mycophenolate mofetil (Cellcept) cap 500 mg       Date Action Dose Route User    9/13/2024 0911 Given 500 mg Oral Monisha Bustillos RN    9/12/2024 2107 Given 500 mg Oral Lili Ortega RN          pantoprazole (Protonix) 40 mg in sodium chloride 0.9% PF 10 mL IV push       Date Action Dose Route User    9/13/2024 0911 Given 40 mg Intravenous Monisha Bustillos RN          piperacillin-tazobactam (Zosyn) 3.375 g in dextrose 5% 100 mL IVPB-ADDV       Date Action Dose Route User    9/13/2024 1228 New Bag 3.375 g Intravenous Monisha Bustillos RN    9/12/2024  2351 New Bag 3.375 g Intravenous Lili Ortega RN          predniSONE (Deltasone) tab 10 mg       Date Action Dose Route User    9/13/2024 0912 Given 10 mg Oral Monisha Bustillos RN          sodium bicarbonate 150 mEq in dextrose 5% 1,000 mL infusion       Date Action Dose Route User    9/13/2024 0909 New Bag 150 mEq Intravenous Monisha Bustillos RN    9/12/2024 2206 New Bag 150 mEq Intravenous Maggy Lundberg RN    9/12/2024 1341 New Bag 150 mEq Intravenous Lila Gordon RN          sodium chloride 0.9% infusion       Date Action Dose Route User    9/13/2024 0942 New Bag (none) Intravenous Monisha Bustillos RN          ursodiol (Actigall) cap 600 mg       Date Action Dose Route User    9/13/2024 0912 Given 600 mg Oral Monisha Bustillos RN    9/12/2024 2107 Given 600 mg Oral Lili Ortega RN            Vitals (last day)       Date/Time Temp Pulse Resp BP SpO2 Weight O2 Device O2 Flow Rate (L/min) [facility]    09/13/24 1210 97.6 °F (36.4 °C) 75 18 111/52 99 % -- -- -- [place]    09/13/24 1050 97.3 °F (36.3 °C) 78 18 106/49 100 % -- -- -- [place]    09/13/24 0950 97.6 °F (36.4 °C) 71 18 105/50 100 % -- -- -- [place]    09/13/24 0905 97.6 °F (36.4 °C) 75 18 98/51 98 % -- None (Room air) -- [place]    09/13/24 0439 97.8 °F (36.6 °C) 73 16 105/53 98 % -- None (Room air) --     09/12/24 2352 -- 73 18 107/53 97 % -- None (Room air) --     09/12/24 2053 98 °F (36.7 °C) 75 18 109/49 100 % -- None (Room air) --     09/12/24 1518 97.5 °F (36.4 °C) 70 18 101/50 98 % -- None (Room air) -- [place] 09/12/24 1106 97.3 °F (36.3 °C) 73 19 109/53 100 % -- -- -- [place] 09/12/24 1037 97.4 °F (36.3 °C) 72 18 117/55 100 % -- None (Room air) -- [place] 09/12/24 0939 97.3 °F (36.3 °C) 73 18 104/47 100 % -- -- --     09/12/24 0837 97.9 °F (36.6 °C) 72 18 107/49 100 % -- None (Room air) -- [place] 09/12/24 0813 97.5 °F (36.4 °C) 76 18 94/55 99 % -- None (Room air) -- [place] 09/12/24 0426 97.5 °F (36.4 °C) 70 18 102/51 100 % -- None (Room air) -- [facility]     09/12/24 0352 97.4 °F (36.3 °C) 73 18 105/49 100 % -- None (Room air) -- [facility]    09/12/24 0252 97.3 °F (36.3 °C) 72 18 90/47 100 % -- -- -- [place]    09/12/24 0207 97.4 °F (36.3 °C) 73 18 102/46 100 % -- -- -- [facility]    09/12/24 0152 97.3 °F (36.3 °C) 73 18 104/44 100 % -- None (Room air) -- [facility]    09/12/24 0131 98 °F (36.7 °C) 73 18 106/48 97 % -- None (Room air) -- [facility]    09/12/24 0008 -- -- -- -- -- 186 lb 9.6 oz (84.6 kg) -- -- EO    09/12/24 0008 97.4 °F (36.3 °C) 75 18 122/54 100 % -- None (Room air) --        Blood Transfusion Record       Product Unit Status Volume Start End            Transfuse RBC     Not assigned Ordered (Discontinued)          24  [id number]  [id number] Transfusing  09/13/24 0931        24  [id number]  [id number] Completed 09/13/24 1218 335.42 mL 09/12/24 0822 09/12/24 1106       24  [id number]  [id number] Completed 09/12/24 0428 304.5 mL 09/12/24 0152 09/12/24 0426                9/11 H&P  Date of Admission:  9/11/2024     Chief Complaint:      Chief Complaint   Patient presents with    Post-Op         History of Present Illness:  Cammie Nunn is a(n) 54 year old female, past medical history significant for primary biliary cholangitis, currently on immunotherapy along with recent left hip replacement presented to the ER complaining of subjective fevers, chills generalized fatigue weakness and nausea along with a poor appetite ongoing for the past 2 days.  Describes the onset as gradual with progressive worsening with no clear aggravating or relieving factors.  Workup in ER indicated worsening of renal function and a drop in hemoglobin.  Patient admits to rather dark stools of late despite her marked hyperbilirubinemia.      Assessment and Plan:     Acute kidney injury  IV fluids initiated, avoid nephrotoxic medications, repeat BMP in a.m.  Will check FENa.  If renal function worsens consider nephrology consult.     Acute blood loss anemia  Possibly postoperative versus GI bleed,  patient to be transfused 1 unit packed RBC, repeat CBC in a.m., check iron studies along with stool for occult blood.  GI to be consulted     Metabolic acidosis  Likely related dehydration versus early sepsis, check lactate levels, continue fluids will repeat labs in AM.     Primary biliary cholangitis  Worsening of LFTs, GI on board     Leukocytosis  Likely steroid-induced, no obvious signs of infection at this time, patient given Zosyn in ER we will check procalcitonin if elevated will resume Zosyn and add vancomycin for possible postoperative infection.  Consider need for orthopedic consult at that time.  Will check blood cultures as well     Coagulopathy  Likely secondary to liver pathology, will give vitamin K 10 mg IV x 1 now     Prophylaxis  Supratherapeutic INR, pathological     CODE STATUS  Full     9/12 HOSPITALIST NOTE    Subjective:   Cammie Nunn is a(n) 54 year old female was seen and examined  Pt lying in bed, appears fatigued, jaundiced  Family at bedside  Pt denies any cp, sob, f,c,n,v abd pain or HA  Endorses SOB with activity  Family reports memory issues of late     Objective:   Blood pressure 109/53, pulse 73, temperature 97.3 °F (36.3 °C), temperature source Oral, resp. rate 19, height 5' 4\" (1.626 m), weight 186 lb 9.6 oz (84.6 kg), last menstrual period 10/17/2018, SpO2 100%.       GENERAL:  The patient appeared to be in no distress and was comfortable. Appears fatigued   SKIN:  Warm and hydrated  PSYCHIATRIC: Calm and cooperative    HEENT:  Head was atraumatic and normocephalic.  Eyes: Sclera was icteric.  Pupils were equal.  Ears:  There were no lesions.  Nose:  No lesions were noted.      NECK:  Supple.  There was no JVD.    CHEST:  Symmetrical movement on inspiration  CARDIAC: S1 S2+, RRR  LUNGS:  CTAB with decreased entry at bases  ABDOMEN: Non-distended, non-tender, BS+  MUSCULOSKELETAL:  There was no deformity.  There was full range of motion in all the extremities.     EXTREMITIES: There was no edema  NEUROLOGIC: Cranial nerves II-XII intact, no focal defecits      Assessment and Plan:   Acute kidney injury  Likely pre-renal etiology due to poor PO intake and profound anemia  Nephrology has been consulted  Started on bircarb gtt  Avoid nephrotoxins  Renal US ordered     Acute blood loss anemia  Pt on ASA BID, reports black stools of late, consider UGI bleed  Pt has rec'd 2 U PRBCs  Hgb >7  GI has been consulted  Will need endoscopic eval, timing TBD      Metabolic acidosis  Due to renal failure  Started on bicarb gtt  Cont to monitor     Primary biliary cholangitis/Autoimmune hepatitis   Worsening of LFTs, GI on board  Cont to monitor      Leukocytosis  Likely reactive, bcx pending   No fevers      Coagulopathy  Likely secondary to liver pathology, given 1 dose Vit K  INR 2.46     Prophylaxis  Supratherapeutic INR, pathological     CODE STATUS  Full     9/12 NEPHROLOGY CONSULT NOTE  Reason for Consultation:   JEM     History of Present Illness:   Patient is a 54 year old female who was admitted to the hospital for Febrile illness:     55 y/o F with h/o autoimmune hepatitis and primary biliary cholangitis  s/p left hip replacement surgery  on 8/28 presented to ER with feeling weak and chills and poor PO intake for few  days.  In er with jem and anemia. Denies any new medication except taking asa bid since discharge. Also c/o black stools.     Cr 2.7 mg/dl , co2 16, and hgb 6.1 , wbc 36   VSS      Physical Exam:   /53   Pulse 73   Temp 97.3 °F (36.3 °C) (Oral)   Resp 19   Ht 5' 4\" (1.626 m)   Wt 186 lb 9.6 oz (84.6 kg)   LMP 10/17/2018   SpO2 100%   BMI 32.03 kg/m²       Intake/Output Summary (Last 24 hours) at 9/12/2024 1218  Last data filed at 9/12/2024 0820      Gross per 24 hour   Intake 1461.5 ml   Output 450 ml   Net 1011.5 ml       Recent Labs   Lab 09/11/24  2138 09/12/24  0620   RBC 1.84* 1.93*   HGB 6.1* 6.1*   HCT 18.6* 19.9*   .1* 103.1*   MCH 33.2  31.6   MCHC 32.8 30.7*   RDW 22.8* 21.1*   NEPRELIM 30.97* 24.72*   WBC 36.0* 29.5*   .0 201.0                 Recent Labs   Lab 09/11/24  2138 09/12/24  0620   * 98   BUN 72* 57*   CREATSERUM 2.71* 2.77*   CA 7.9* 8.1*    139   K 3.6 3.7    112   CO2 16.0* 14.0*          Impression/Plan:      Impression:  JEM likely sec to dec renal perfusion bc of profound anemia and poor  po intake . UA is bland and low urine sodium. Doubt HRS. S/p prbc and will continue IVF. Renal US  AG Metabolic acidosis sec to renal failure and dec perfusion. Switch to bicarb drip  Anemia r/o gu bleed. [place] in light of asa use and black stools. S/p prbc. Iron panel ok  AAH/PBC with worsening LFTS and coagulopathy. Gi consulted. Check nh3           Plan:  Change to bicarb drip  Renal US  Prbc  Avoid nephrotoxins  Renally dose all meds  Labs in am

## 2024-09-13 NOTE — PROGRESS NOTES
[facility]  part of [facility]    Progress Note    Cammie Nunn Patient Status:  Inpatient    1970 MRN [id number]   Location [facility] 5SW/SE Attending Abimael Mcfadden MD   Hosp Day # 2 PCP Endy Solorzano MD       Subjective:   Cammie Nunn is a(n) 54 year old female was seen and examined  Pt lying in bed, remains fatigued, jaundiced  Family at bedside  Pt denies any cp, sob, f,c,n,v abd pain or HA  Endorses SOB with activity    Objective:   Blood pressure 111/52, pulse 75, temperature 97.6 °F (36.4 °C), temperature source Oral, resp. rate 18, height 5' 4\" (1.626 m), weight 186 lb 9.6 oz (84.6 kg), last menstrual period 10/17/2018, SpO2 99%.    GENERAL:  The patient appeared to be in no distress and was comfortable. Appears fatigued   SKIN:  Warm and hydrated  PSYCHIATRIC: Calm and cooperative    HEENT:  Head was atraumatic and normocephalic.  Eyes: Sclera was icteric.  Pupils were equal.  Ears:  There were no lesions.  Nose:  No lesions were noted.      NECK:  Supple.  There was no JVD.    CHEST:  Symmetrical movement on inspiration  CARDIAC: S1 S2+, RRR  LUNGS:  CTAB with decreased entry at bases  ABDOMEN: Non-distended, non-tender, BS+  MUSCULOSKELETAL:  There was no deformity.  There was full range of motion in all the extremities.    EXTREMITIES: There was no edema  NEUROLOGIC: Cranial nerves II-XII intact, no focal defecits    Current Inpatient Medications:     Current Facility-Administered Medications:     rifAXIMin (Xifaxan) tab 550 mg, 550 mg, Oral, BID    lactulose (CHRONULAC) 10 GM/15ML solution 10 g, 10 g, Oral, TID    pantoprazole (Protonix) 40 mg in sodium chloride 0.9% PF 10 mL IV push, 40 mg, Intravenous, Q12H    mycophenolate mofetil (Cellcept) cap 500 mg, 500 mg, Oral, BID    aspirin DR tab 81 mg, 81 mg, Oral, BID    ursodiol (Actigall) cap 600 mg, 600 mg, Oral, BID    predniSONE (Deltasone) tab 10 mg, 10 mg, Oral, Daily    ondansetron  (Zofran) 4 MG/2ML injection 4 mg, 4 mg, Intravenous, Q6H PRN    zolpidem (Ambien) tab 5 mg, 5 mg, Oral, Nightly PRN    alum-mag hydroxide-simethicone (Maalox) 200-200-20 MG/5ML oral suspension 30 mL, 30 mL, Oral, QID PRN    piperacillin-tazobactam (Zosyn) 3.375 g in dextrose 5% 100 mL IVPB-ADDV, 3.375 g, Intravenous, q12h    Vancomycin: PHARMACY DOSING, 1 each, Intravenous, See Admin Instructions (RX holding)    sodium chloride 0.9% infusion, , Intravenous, Once    sodium bicarbonate 150 mEq in dextrose 5% 1,000 mL infusion, 150 mEq, Intravenous, Continuous    Assessment and Plan:   Acute kidney injury  Likely pre-renal etiology due to poor PO intake and profound anemia  Nephrology has been consulted  Started on bircarb gtt  Avoid nephrotoxins  Renal US essentially WNL  Cont to monitor cr      Acute blood loss anemia  Pt on ASA BID, reports black stools of late, consider UGI bleed  Pt has rec'd 2 U PRBCs  Got 1 u PRBC in AM due to <7 hgb  GI has been consulted  Will need endoscopic eval, timing TBD      Metabolic acidosis  Due to renal failure  Started on bicarb gtt  Cont to monitor     Primary biliary cholangitis/Autoimmune hepatitis   Worsening of LFTs, GI on board  Cont to monitor   CT abd and pelvis ordered     Leukocytosis  Likely reactive, bcx NGTD  Infectious source unclear at this time, consulted ID and ortho  PJI unlikely   No fevers      Coagulopathy  Likely secondary to liver pathology, given 1 dose Vit K  INR 1.54     Prophylaxis  Supratherapeutic INR, pathological     CODE STATUS  Full    MDM: High  D/w RN staff, pt, family and [place] Perez Ma, Behery, Koppe and Estephania     Results:     Recent Labs   Lab 09/12/24  0620 09/12/24  1216 09/13/24  0544 09/13/24  1308   RBC 1.93*  --  1.94* 2.68*   HGB 6.1* 7.4* 6.3* 8.4*   HCT 19.9* 21.5* 18.1* 25.5*   .1*  --  93.3 95.1   MCH 31.6  --  32.5 31.3   MCHC 30.7*  --  34.8 32.9   RDW 21.1*  --  20.7* 20.6*   NEPRELIM 24.72*  --  19.30* 18.60*   WBC 29.5*   --  23.3* 21.7*   .0  --  155.0 150.0         Recent Labs   Lab 09/11/24  2138 09/12/24  0620 09/13/24  0544   * 98 122*   BUN 72* 57* 74*   CREATSERUM 2.71* 2.77* 3.00*   EGFRCR 20* 19* 18*   CA 7.9* 8.1* 7.6*    139 140   K 3.6 3.7 3.0*    112 108   CO2 16.0* 14.0* 21.0         Imaging:   US VENOUS DOPPLER LEG LEFT - DIAG IMG (CPT=93971)    Result Date: 9/12/2024  CONCLUSION:  Negative for sonographic evidence of deep venous thrombosis in the left lower extremity.    A preliminary report was issued by the [facility] Radiology teleradiology service. There are no major discrepancies.   Dictated by (CST): Kee Sosa MD on 9/12/2024 at 5:41 AM     Finalized by (CST): Kee Sosa MD on 9/12/2024 at 5:42 AM          XR CHEST AP PORTABLE  (CPT=71045)    Result Date: 9/11/2024  CONCLUSION:  Expiratory chest without acute cardiopulmonary process.   [facility]-remote  Dictated by (CST): Mustapha Barrera MD on 9/11/2024 at 9:48 PM     Finalized by (CST): Mustapha Barrera MD on 9/11/2024 at 9:48 PM                 Abimael Mcfadden MD  9/13/2024

## 2024-09-13 NOTE — DISCHARGE INSTRUCTIONS
Older Adult Services by: [employer]  Next Steps:    Call [phone] to get more info.    Email [email or web address] to get more info.  About: [facility] [employer] provides a comprehensive range of services, education, and health and wellness programs to adults age 60 and older and their family members. Programs and services are designed to provide options for care to older adults in order to enhance their ability to remain as independent as possible in their own communities.    This program provides:    - Comprehensive care coordination  - Caregiver support  - Benefit assistance  - Adult protective services  - Evidenced-based health workshops  - Transitional care  - Congregate meals  - Options counseling  - Transportation assistance.    For more information, please call [phone] or even email at [email or web address].    Eligibility: This program serves seniors.    Nearest location: 5.36 miles away.  [address] [address]   [phone]  Hours:  Monday:08:30 AM - 04:30 PM  Tuesday:08:30 AM - 04:30 PM  Wednesday:08:30 AM - 04:30 PM  Thursday:08:30 AM - 04:30 PM  Friday:08:30 AM - 04:30 PM  Support for Success by:  [employer] ([employer])  Next Steps:    Apply on their website, [email or web address]://[email or web address]/programs/[email or web address]-programs/support-[email or web address]-success/.    Schedule on their website, [email or web address][email or web address]-[email or web address]  About:  [employer]'s Support for Success program provides comprehensive supportive services to program participants designed to assist them in achieving their goals. The program provides needed support to program participants who are transitioning into employment with basic needs, such as transportation, clothing, uniforms, fee payments, work equipment and certification exams, childcare assistance.    This program provides:    - Food assistance  - Utility assistance  - Personal  toiletries  - Rental assistance  - Security deposits  - Transportation cost  - Clothing  - Uniforms  - Fee payments  - Work equipment  - Certification exam assistance  - Childcare assistance  - Case management    Eligibility: This program serves youth who are transitioning into employment    Nearest location: 6.11 miles away.  [facility] Location  [address] [place] [address]  Hours:  Monday:08:00 AM - 05:00 PM  Tuesday:08:00 AM - 05:00 PM  Wednesday:08:00 AM - 05:00 PM  Thursday:08:00 AM - 05:00 PM  Friday:08:00 AM - 05:00 PM  Supportive Living by: [employer]  Next Steps:    Call [phone] to get more info.  About: Supportive Living program offers services to help support individuals in their own homes. Services can include self-advocacy, household skills, self-care, transportation, financial management, social networking and more. For more information call [phone].    Nearest location: 5.25 miles away.  [employer]  [address] [place] [address]  Hours:  Monday:08:00 AM - 05:00 PM  Tuesday:08:00 AM - 05:00 PM  Wednesday:08:00 AM - 05:00 PM  Thursday:08:00 AM - 05:00 PM  Friday:08:00 AM - 05:00 PM  Select Language  English

## 2024-09-13 NOTE — TRANSFER CENTER NOTE
Spoke to [facility] transfer center. Per Dr Ness at [facility] hepatology, to hold on transfer and to re-assess over the weekend if a transfer is still needed.

## 2024-09-13 NOTE — CONSULTS
[facility]  part of [facility] ID CONSULT NOTE    Cammie Nunn Patient Status:  Inpatient    1970 MRN [id number]   Location 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 Patient is a 54-year-old female who initially had abdominal pain with elevated LFTs 2022 with concern for cholecystitis and discharged on antibiotics.  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  Underwent ERCP with a sphincterotomy and stent placement as well as cholecystectomy in 2022 with closure of a cholecysto colonic fistula.  Had delayed follow-up in 2023 with jaundice, elevated LFTs with repeat ERCP showing occluded stent that was removed.  Follow-up in 2023 for colonoscopy and again noted to have jaundice with elevated LFTs with workup showing positive AMA and SMA serologies.  Liver biopsy in 2023 showed evidence of PBC and AIH overlap.  Started on steroids in 2024 with poor follow-up.  Started taper and started on azathioprine 2024 and ursodiol.    Recently admitted in 2024 for chronic hip  pain and found to have a chronic femoral neck fracture with osteoarthritis and on 8/28/2024 underwent left hybrid MALCOLM.  Now presents to hospital on 9/11 for feeling unwell since the surgery with fevers, chills, unwell. Poor PO intake with nausea w/o emesis. Somewhat a poor historian.    History:  Past Medical History:    Chronic obstructive pulmonary disease on long-term inhaled steroid therapy (HCC)    Disorder of liver    autoimmune disease    Essential hypertension    not taking medications    Febrile illness    High blood pressure    off medications since 2022 per patient    High cholesterol    Mirizzi's syndrome (HCC)    Osteoarthritis    Visual impairment     Past Surgical History:   Procedure Laterality Date    Cholecystectomy      Colonoscopy N/A 12/12/2023    Procedure: COLONOSCOPY;  Surgeon: Jeison Diamond MD;  Location: [facility] ENDOSCOPY    Tonsillectomy       Family History   Problem Relation Age of Onset    Lipids Mother     Hypertension Mother     Diabetes Mother     Cancer Mother       reports that she has never smoked. She has never been exposed to tobacco smoke. She has never used smokeless tobacco. She reports that she does not currently use alcohol. She reports that she does not use drugs.    Allergies:  No Known Allergies    Medications:    Current Facility-Administered Medications:     rifAXIMin (Xifaxan) tab 550 mg, 550 mg, Oral, BID    lactulose (CHRONULAC) 10 GM/15ML solution 10 g, 10 g, Oral, TID    pantoprazole (Protonix) 40 mg in sodium chloride 0.9% PF 10 mL IV push, 40 mg, Intravenous, Q12H    mycophenolate mofetil (Cellcept) cap 500 mg, 500 mg, Oral, BID    aspirin DR tab 81 mg, 81 mg, Oral, BID    ursodiol (Actigall) cap 600 mg, 600 mg, Oral, BID    predniSONE (Deltasone) tab 10 mg, 10 mg, Oral, Daily    ondansetron (Zofran) 4 MG/2ML injection 4 mg, 4 mg, Intravenous, Q6H PRN    zolpidem (Ambien) tab 5 mg, 5 mg, Oral, Nightly PRN    alum-mag hydroxide-simethicone (Maalox)  200-200-20 MG/5ML oral suspension 30 mL, 30 mL, Oral, QID PRN    piperacillin-tazobactam (Zosyn) 3.375 g in dextrose 5% 100 mL IVPB-ADDV, 3.375 g, Intravenous, q12h    Vancomycin: PHARMACY DOSING, 1 each, Intravenous, See Admin Instructions (RX holding)    sodium chloride 0.9% infusion, , Intravenous, Once    sodium bicarbonate 150 mEq in dextrose 5% 1,000 mL infusion, 150 mEq, Intravenous, Continuous    Review of Systems:  Per HPI    Physical Exam:  Vital signs: Blood pressure 111/52, pulse 75, temperature 97.6 °F (36.4 °C), temperature source Oral, resp. rate 18, height 162.6 cm (5' 4\"), weight 186 lb 9.6 oz (84.6 kg), last menstrual period 10/17/2018, SpO2 99%.    General: alert, in bed, nad, room air  HEENT: Moist mucous membranes. EOMI; +juandice  Neck: No lymphadenopathy.  Supple.  Cardiovascular: No chest wall tenderness  Respiratory: Symmetric expansion  Abdomen: Soft, mild distension, non-tender  Musculoskeletal: L hip with good pass ROM; incision c/d/I; +LLE edema  Integument: No lesions. No erythema.    Laboratory Data: Reviewed in EMR    Microbiology: Reviewed in EMR    Radiology: Reviewed    Thank you for allowing us to participate in the care of this patient. Please do not hesitate to call if you have any questions.     We will continue to follow with you and will make further recommendations based on his progress.    Luis Manuel Best MD   [facility]  [phone]  9/13/2024

## 2024-09-13 NOTE — OCCUPATIONAL THERAPY NOTE
Attempted to see pt for OT today. Per discussion with RN, pt is really sick and attempting to transfer to another hospital for higher level of care. RN requesting to hold for today.    Danielle Juan, OTR/L

## 2024-09-14 ENCOUNTER — APPOINTMENT (OUTPATIENT)
Dept: MRI IMAGING | Facility: HOSPITAL | Age: 54
End: 2024-09-14
Attending: INTERNAL MEDICINE
Payer: COMMERCIAL

## 2024-09-14 VITALS
SYSTOLIC BLOOD PRESSURE: 121 MMHG | HEART RATE: 67 BPM | OXYGEN SATURATION: 97 % | TEMPERATURE: 97 F | RESPIRATION RATE: 20 BRPM | BODY MASS INDEX: 31.86 KG/M2 | HEIGHT: 64 IN | WEIGHT: 186.63 LBS | DIASTOLIC BLOOD PRESSURE: 64 MMHG

## 2024-09-14 LAB
ALBUMIN SERPL-MCNC: 2.5 G/DL (ref 3.2–4.8)
ALBUMIN/GLOB SERPL: 1.2 {RATIO} (ref 1–2)
ALP LIVER SERPL-CCNC: 258 U/L
ALT SERPL-CCNC: 123 U/L
AMMONIA PLAS-MCNC: 26 UMOL/L (ref 11–32)
ANION GAP SERPL CALC-SCNC: 11 MMOL/L (ref 0–18)
AST SERPL-CCNC: 161 U/L (ref ?–34)
BASOPHILS # BLD: 0 X10(3) UL (ref 0–0.2)
BASOPHILS NFR BLD: 0 %
BILIRUB SERPL-MCNC: 28.7 MG/DL (ref 0.3–1.2)
BUN BLD-MCNC: 75 MG/DL (ref 9–23)
BUN/CREAT SERPL: 23.7 (ref 10–20)
CALCIUM BLD-MCNC: 7.7 MG/DL (ref 8.7–10.4)
CHLORIDE SERPL-SCNC: 108 MMOL/L (ref 98–112)
CO2 SERPL-SCNC: 21 MMOL/L (ref 21–32)
CREAT BLD-MCNC: 3.17 MG/DL
DEPRECATED RDW RBC AUTO: 59 FL (ref 35.1–46.3)
EGFRCR SERPLBLD CKD-EPI 2021: 17 ML/MIN/1.73M2 (ref 60–?)
EOSINOPHIL # BLD: 0 X10(3) UL (ref 0–0.7)
EOSINOPHIL NFR BLD: 0 %
ERYTHROCYTE [DISTWIDTH] IN BLOOD BY AUTOMATED COUNT: 21 % (ref 11–15)
GLOBULIN PLAS-MCNC: 2.1 G/DL (ref 2–3.5)
GLUCOSE BLD-MCNC: 127 MG/DL (ref 70–99)
HCT VFR BLD AUTO: 20.4 %
HCT VFR BLD AUTO: 20.9 %
HGB BLD-MCNC: 7 G/DL
HGB BLD-MCNC: 7.2 G/DL
LYMPHOCYTES NFR BLD: 0.94 X10(3) UL (ref 1–4)
LYMPHOCYTES NFR BLD: 5 %
MAGNESIUM SERPL-MCNC: 2.2 MG/DL (ref 1.6–2.6)
MCH RBC QN AUTO: 31.1 PG (ref 26–34)
MCHC RBC AUTO-ENTMCNC: 34.3 G/DL (ref 31–37)
MCV RBC AUTO: 90.7 FL
MONOCYTES # BLD: 0.75 X10(3) UL (ref 0.1–1)
MONOCYTES NFR BLD: 4 %
NEUTROPHILS # BLD AUTO: 15.86 X10 (3) UL (ref 1.5–7.7)
NEUTROPHILS NFR BLD: 91 %
NEUTS HYPERSEG # BLD: 17.02 X10(3) UL (ref 1.5–7.7)
OSMOLALITY SERPL CALC.SUM OF ELEC: 314 MOSM/KG (ref 275–295)
PLATELET # BLD AUTO: 131 10(3)UL (ref 150–450)
PLATELET MORPHOLOGY: NORMAL
POTASSIUM SERPL-SCNC: 2.9 MMOL/L (ref 3.5–5.1)
PROT SERPL-MCNC: 4.6 G/DL (ref 5.7–8.2)
RBC # BLD AUTO: 2.25 X10(6)UL
SODIUM SERPL-SCNC: 140 MMOL/L (ref 136–145)
TOTAL CELLS COUNTED BLD: 100
VANCOMYCIN SERPL-MCNC: 10.7 UG/ML (ref ?–40)
WBC # BLD AUTO: 18.7 X10(3) UL (ref 4–11)

## 2024-09-14 PROCEDURE — 74181 MRI ABDOMEN W/O CONTRAST: CPT | Performed by: INTERNAL MEDICINE

## 2024-09-14 PROCEDURE — 99233 SBSQ HOSP IP/OBS HIGH 50: CPT | Performed by: HOSPITALIST

## 2024-09-14 PROCEDURE — 76376 3D RENDER W/INTRP POSTPROCES: CPT | Performed by: INTERNAL MEDICINE

## 2024-09-14 PROCEDURE — 99232 SBSQ HOSP IP/OBS MODERATE 35: CPT | Performed by: INTERNAL MEDICINE

## 2024-09-14 PROCEDURE — 99233 SBSQ HOSP IP/OBS HIGH 50: CPT | Performed by: INTERNAL MEDICINE

## 2024-09-14 RX ORDER — VANCOMYCIN HYDROCHLORIDE
15 ONCE
Status: DISCONTINUED | OUTPATIENT
Start: 2024-09-14 | End: 2024-09-14

## 2024-09-14 RX ORDER — LACTULOSE 10 G/15ML
10 SOLUTION ORAL 3 TIMES DAILY
Status: SHIPPED | COMMUNITY
Start: 2024-09-15

## 2024-09-14 RX ORDER — POTASSIUM CHLORIDE 1500 MG/1
40 TABLET, EXTENDED RELEASE ORAL ONCE
Status: COMPLETED | OUTPATIENT
Start: 2024-09-14 | End: 2024-09-14

## 2024-09-14 RX ORDER — SODIUM CHLORIDE 9 MG/ML
INJECTION, SOLUTION INTRAVENOUS ONCE
Status: COMPLETED | OUTPATIENT
Start: 2024-09-14 | End: 2024-09-14

## 2024-09-14 RX ORDER — ALBUMIN (HUMAN) 12.5 G/50ML
25 SOLUTION INTRAVENOUS ONCE
Status: COMPLETED | OUTPATIENT
Start: 2024-09-14 | End: 2024-09-14

## 2024-09-14 RX ORDER — URSODIOL 300 MG/1
600 CAPSULE ORAL 2 TIMES DAILY
Status: SHIPPED | COMMUNITY
Start: 2024-09-14

## 2024-09-14 NOTE — PROGRESS NOTES
[facility]  part of [facility]    Progress Note    Cammie Nunn Patient Status:  Inpatient    1970 MRN [id number]   Location [facility] 5SW/SE Attending Abimael Mcfadden MD   Hosp Day # 3 PCP Endy Solorzano MD       Subjective:   Cammie Nunn is a(n) 54 year old female was seen and examined  Pt lying in bed, currently somnolent due to ativan   Family at bedside  No acute distress    Objective:   Blood pressure 93/53, pulse 70, temperature 97.6 °F (36.4 °C), temperature source Axillary, resp. rate 16, height 5' 4\" (1.626 m), weight 186 lb 9.6 oz (84.6 kg), last menstrual period 10/17/2018, SpO2 98%.    GENERAL:  The patient appeared to be in no distress and was comfortable. Somnolent   SKIN:  Warm and hydrated  PSYCHIATRIC: Calm and cooperative    HEENT:  Head was atraumatic and normocephalic.  Eyes: Sclera was icteric.  Pupils were equal.  Ears:  There were no lesions.  Nose:  No lesions were noted.      NECK:  Supple.  There was no JVD.    CHEST:  Symmetrical movement on inspiration  CARDIAC: S1 S2+, RRR  LUNGS:  CTAB with decreased entry at bases  ABDOMEN: Non-distended, non-tender, BS+  MUSCULOSKELETAL:  There was no deformity.  There was full range of motion in all the extremities.    EXTREMITIES: There was no edema  NEUROLOGIC: Cranial nerves II-XII intact, no focal defecits    Current Inpatient Medications:     Current Facility-Administered Medications:     melatonin cap/tab 5 mg, 5 mg, Oral, Nightly PRN    sodium chloride 0.9% infusion, , Intravenous, Once    potassium chloride (Klor-Con M20) tab 40 mEq, 40 mEq, Oral, Once    rifAXIMin (Xifaxan) tab 550 mg, 550 mg, Oral, BID    lactulose (CHRONULAC) 10 GM/15ML solution 10 g, 10 g, Oral, TID    pantoprazole (Protonix) 40 mg in sodium chloride 0.9% PF 10 mL IV push, 40 mg, Intravenous, Q12H    sodium chloride 0.9% infusion, , Intravenous, Continuous    LORazepam (Ativan) tab 1 mg, 1 mg, Oral, Q8H  PRN    aspirin DR tab 81 mg, 81 mg, Oral, BID    ursodiol (Actigall) cap 600 mg, 600 mg, Oral, BID    predniSONE (Deltasone) tab 10 mg, 10 mg, Oral, Daily    ondansetron (Zofran) 4 MG/2ML injection 4 mg, 4 mg, Intravenous, Q6H PRN    alum-mag hydroxide-simethicone (Maalox) 200-200-20 MG/5ML oral suspension 30 mL, 30 mL, Oral, QID PRN    piperacillin-tazobactam (Zosyn) 3.375 g in dextrose 5% 100 mL IVPB-ADDV, 3.375 g, Intravenous, q12h    sodium chloride 0.9% infusion, , Intravenous, Once    Assessment and Plan:   Acute kidney injury  Likely pre-renal etiology due to poor PO intake and profound anemia  Nephrology has been consulted  Started on bircarb gtt  Avoid nephrotoxins  Renal US essentially WNL  Cr up trending     Acute blood loss anemia  Pt on ASA BID, reports black stools of late, consider UGI bleed  Pt has rec'd 3 U PRBCs till date  Will need 1 U PRBC today for hgb of 7  GI has been consulted  Will need endoscopic eval, timing TBD      Metabolic acidosis  Due to renal failure  Started on bicarb gtt  Cont to monitor     Primary biliary cholangitis/Autoimmune hepatitis   Worsening of LFTs, GI on board  Cont to monitor   CT abd and pelvis ordered     Leukocytosis  Likely reactive, bcx NGTD  Infectious source unclear at this time, consulted ID and ortho  PJI unlikely   No fevers      Coagulopathy  Likely secondary to liver pathology, given 1 dose Vit K  INR 1.54     Prophylaxis  Supratherapeutic INR, pathological     CODE STATUS  Full    MDM: High  Dispo: GI team would like pt transferred to [facility] for high level of care and possible liver transplant evaluation    Results:     Recent Labs   Lab 09/13/24  0544 09/13/24  1308 09/14/24  0614   RBC 1.94* 2.68* 2.25*   HGB 6.3* 8.4* 7.0*   HCT 18.1* 25.5* 20.4*   MCV 93.3 95.1 90.7   MCH 32.5 31.3 31.1   MCHC 34.8 32.9 34.3   RDW 20.7* 20.6* 21.0*   NEPRELIM 19.30* 18.60* 15.86*   WBC 23.3* 21.7* 18.7*   .0 150.0 131.0*         Recent Labs   Lab 09/12/24  0620  09/13/24  0544 09/13/24  1437 09/14/24  0614   GLU 98 122*  --  127*   BUN 57* 74*  --  75*   CREATSERUM 2.77* 3.00*  --  3.17*   EGFRCR 19* 18*  --  17*   CA 8.1* 7.6*  --  7.7*    140  --  140   K 3.7 3.0* 3.0* 2.9*    108  --  108   CO2 14.0* 21.0  --  21.0         Imaging:   MRI MRCP W/3D ONLY (CPT=76376/16323)    Result Date: 9/14/2024  CONCLUSION:   Cirrhosis with portal hypertension.  Moderate ascites.  Anasarca.  No ductal dilation.  Wall thickening of the stomach may be reactive or secondary to gastritis.      Dictated by (CST): Macario Dawson MD on 9/14/2024 at 12:52 PM     Finalized by (CST): Macario Dawson MD on 9/14/2024 at 1:00 PM          CT LOWER EXT LEFT (WO IV) (CPT=73700)    Result Date: 9/13/2024  CONCLUSION:  1. Stable postoperative changes from a left total hip arthroplasty.  No periprosthetic fracture, dislocation or radiographic evidence of prosthetic loosening.  No periprosthetic fluid collection or other evidence of an infected prosthesis. 2. Redemonstration of diffuse subcutaneous edema that was previously seen throughout the abdomen and extends to the visualized aspects of the lower extremities bilaterally, all of which likely relates to anasarca.  Free pelvic fluid is also similar and may relate to anasarca and/or portal venous hypertension given known history of cirrhosis.   [facility]  Dictated by (CST): Mustapha Barrera MD on 9/13/2024 at 6:13 PM     Finalized by (CST): Mustapha Barrera MD on 9/13/2024 at 6:19 PM                 Abimael Mcfadden MD  9/14/2024

## 2024-09-14 NOTE — PROGRESS NOTES
[facility]  part of [facility]    Nephrology Progress Note    Chief Complaint   Patient presents with    Post-Op       Subjective:   54 year old female, following for JEM.     Denies shortness of breath.  Feeling tired.  Urine output 700 mL per 24 hours.    Sons at bedside.     Review of Systems:   Review of Systems   Constitutional:  Positive for fatigue. Negative for chills and fever.   Respiratory:  Negative for cough and shortness of breath.    Cardiovascular:  Negative for chest pain and leg swelling.   Gastrointestinal:  Negative for abdominal pain, constipation, diarrhea, nausea and vomiting.       Objective:   Temp:  [97.5 °F (36.4 °C)-98.1 °F (36.7 °C)] 97.5 °F (36.4 °C)  Pulse:  [69-78] 69  Resp:  [16-18] 16  BP: (110-119)/(48-54) 110/52  SpO2:  [93 %-99 %] 97 %  SpO2: 97 %     Intake/Output Summary (Last 24 hours) at 9/14/2024 1213  Last data filed at 9/14/2024 0700  Gross per 24 hour   Intake 874.17 ml   Output 700 ml   Net 174.17 ml     Wt Readings from Last 3 Encounters:   09/12/24 186 lb 9.6 oz (84.6 kg)   08/13/24 169 lb (76.7 kg)   08/14/24 170 lb (77.1 kg)     Physical Exam  Constitutional:       Appearance: Normal appearance.   Cardiovascular:      Rate and Rhythm: Normal rate and regular rhythm.      Heart sounds: Normal heart sounds.   Pulmonary:      Effort: Pulmonary effort is normal.      Breath sounds: Normal breath sounds.   Musculoskeletal:         General: Normal range of motion.      Comments: 1+ edema   Skin:     General: Skin is warm and dry.   Neurological:      General: No focal deficit present.      Mental Status: She is alert and oriented to person, place, and time.   Psychiatric:         Mood and Affect: Mood normal.         Behavior: Behavior normal.         Medications:  Current Facility-Administered Medications   Medication Dose Route Frequency    melatonin cap/tab 5 mg  5 mg Oral Nightly PRN    albumin human (Albumin) 25% injection 25 g  25 g Intravenous  Once    sodium chloride 0.9% infusion   Intravenous Once    rifAXIMin (Xifaxan) tab 550 mg  550 mg Oral BID    lactulose (CHRONULAC) 10 GM/15ML solution 10 g  10 g Oral TID    pantoprazole (Protonix) 40 mg in sodium chloride 0.9% PF 10 mL IV push  40 mg Intravenous Q12H    sodium chloride 0.9% infusion   Intravenous Continuous    LORazepam (Ativan) tab 1 mg  1 mg Oral Q8H PRN    aspirin DR tab 81 mg  81 mg Oral BID    ursodiol (Actigall) cap 600 mg  600 mg Oral BID    predniSONE (Deltasone) tab 10 mg  10 mg Oral Daily    ondansetron (Zofran) 4 MG/2ML injection 4 mg  4 mg Intravenous Q6H PRN    alum-mag hydroxide-simethicone (Maalox) 200-200-20 MG/5ML oral suspension 30 mL  30 mL Oral QID PRN    piperacillin-tazobactam (Zosyn) 3.375 g in dextrose 5% 100 mL IVPB-ADDV  3.375 g Intravenous q12h    sodium chloride 0.9% infusion   Intravenous Once              Results:     Recent Labs   Lab 09/13/24  0544 09/13/24  1308 09/14/24  0614   RBC 1.94* 2.68* 2.25*   HGB 6.3* 8.4* 7.0*   HCT 18.1* 25.5* 20.4*   MCV 93.3 95.1 90.7   NEPRELIM 19.30* 18.60* 15.86*   WBC 23.3* 21.7* 18.7*   .0 150.0 131.0*     Recent Labs   Lab 09/11/24  2138 09/12/24  0620 09/13/24  0544 09/13/24  1437 09/14/24  0614   * 98 122*  --  127*   BUN 72* 57* 74*  --  75*   CREATSERUM 2.71* 2.77* 3.00*  --  3.17*   CA 7.9* 8.1* 7.6*  --  7.7*   ALB 2.4*  --  2.2*  --  2.5*    139 140  --  140   K 3.6 3.7 3.0* 3.0* 2.9*    112 108  --  108   CO2 16.0* 14.0* 21.0  --  21.0   ALKPHO 325*  --  290*  --  258*   *  --  192*  --  161*   *  --  136*  --  123*   BILT 26.3*  --  26.1*  --  28.7*   TP 5.0*  --  4.2*  --  4.6*     PTT   Date Value Ref Range Status   08/14/2024 33.1 23.0 - 36.0 seconds Final     INR   Date Value Ref Range Status   09/13/2024 1.54 (H) 0.80 - 1.20 Final     Comment:     Therapeutic INR range for patients on warfarin:  2.0-3.0 for most medical conditions and surgical prophylaxis   2.5-3.5 for  mechanical heart valves and recurrent thromboembolism    Direct thrombin inhibitors (e.g. argatroban, bivalirudin), factor Xa inhibitors (e.g. apixaban, rivaroxaban), and conditions such as coagulation factor deficiency, vitamin K deficiency, and liver disease will prolong the prothrombin time/INR.    Unfractionated heparin and low molecular weight heparin do not affect the prothrombin time/INR up to a concentration of 1 IU/mL and 1.5 IU/mL, respectively.         No results for input(s): \"BNP\" in the last 168 hours.  Recent Labs   Lab 09/14/24  0614   MG 2.2        Recent Labs   Lab 09/11/24  2138 09/13/24  0544 09/14/24  0614   ALB 2.4* 2.2* 2.5*       CT LOWER EXT LEFT (WO IV) (CPT=73700)    Result Date: 9/13/2024  CONCLUSION:  1. Stable postoperative changes from a left total hip arthroplasty.  No periprosthetic fracture, dislocation or radiographic evidence of prosthetic loosening.  No periprosthetic fluid collection or other evidence of an infected prosthesis. 2. Redemonstration of diffuse subcutaneous edema that was previously seen throughout the abdomen and extends to the visualized aspects of the lower extremities bilaterally, all of which likely relates to anasarca.  Free pelvic fluid is also similar and may relate to anasarca and/or portal venous hypertension given known history of cirrhosis.   [employer]  Dictated by (CST): Mustapha Barrera MD on 9/13/2024 at 6:13 PM     Finalized by (CST): Mustapha Barrera MD on 9/13/2024 at 6:19 PM               Assessment and Plan:     54 year old female with history of autoimmune hepatitis and primary biliary cholangitis, s/p left hip replacement surgery on 8/28/24, who presented with generalized weakness and decreased oral intake for a few days.     JEM:   Likely due to relative hypotension, pre-renal and anemia  Cr 3.17 today, slightly increased.   Cont NS at 50 ml/hr.   Monitor I/Os.     Hypokalemia:   Potassium chloride 40 meq x 1 ordered  .  Autoimmune  hepatitis:   Cellcept on hold due to infection.   Planning for MRCP today.  GI following.     Leukocytosis:   No clear source identified.   On Zosyn. Vanc stopped.   ID following      Jorden Tovar MD  9/14/2024

## 2024-09-14 NOTE — PLAN OF CARE
Problem: Patient Centered Care  Goal: Patient preferences are identified and integrated in the patient's plan of care  Description: Interventions:  - What would you like us to know as we care for you? Home w/ son   - Provide timely, complete, and accurate information to patient/family  - Incorporate patient and family knowledge, values, beliefs, and cultural backgrounds into the planning and delivery of care  - Encourage patient/family to participate in care and decision-making at the level they choose  - Honor patient and family perspectives and choices  Outcome: Progressing     Problem: Patient/Family Goals  Goal: Patient/Family Long Term Goal  Description: Patient's Long Term Goal: Go home better overall     Interventions:  - Transfer to [facility] pending bed availability   - See additional Care Plan goals for specific interventions  Outcome: Progressing  Goal: Patient/Family Short Term Goal  Description: Patient's Short Term Goal: Figure out what I going on     Interventions:   - Blood work  -ID, GI, ortho to see   - See additional Care Plan goals for specific interventions  Outcome: Progressing     Problem: SKIN/TISSUE INTEGRITY - ADULT  Goal: Skin integrity remains intact  Description: INTERVENTIONS  - Assess and document risk factors for pressure ulcer development  - Assess and document skin integrity  - Monitor for areas of redness and/or skin breakdown  - Initiate interventions, skin care algorithm/standards of care as needed  Outcome: Progressing  Goal: Incision(s), wounds(s) or drain site(s) healing without S/S of infection  Description: INTERVENTIONS:  - Assess and document risk factors for pressure ulcer development  - Assess and document skin integrity  - Assess and document dressing/incision, wound bed, drain sites and surrounding tissue  - Implement wound care per orders  - Initiate isolation precautions as appropriate  - Initiate Pressure Ulcer prevention bundle as indicated  Outcome:  Progressing     Problem: MUSCULOSKELETAL - ADULT  Goal: Return mobility to safest level of function  Description: INTERVENTIONS:  - Assess patient stability and activity tolerance for standing, transferring and ambulating w/ or w/o assistive devices  - Assist with transfers and ambulation using safe patient handling equipment as needed  - Ensure adequate protection for wounds/incisions during mobilization  - Obtain PT/OT consults as needed  - Advance activity as appropriate  - Communicate ordered activity level and limitations with patient/family  Outcome: Progressing     Problem: SAFETY ADULT - FALL  Goal: Free from fall injury  Description: INTERVENTIONS:  - Assess pt frequently for physical needs  - Identify cognitive and physical deficits and behaviors that affect risk of falls.  - [facility] fall precautions as indicated by assessment.  - Educate pt/family on patient safety including physical limitations  - Instruct pt to call for assistance with activity based on assessment  - Modify environment to reduce risk of injury  - Provide assistive devices as appropriate  - Consider OT/PT consult to assist with strengthening/mobility  - Encourage toileting schedule  Outcome: Progressing     Problem: DISCHARGE PLANNING  Goal: Discharge to home or other facility with appropriate resources  Description: INTERVENTIONS:  - Identify barriers to discharge w/pt and caregiver  - Include patient/family/discharge partner in discharge planning  - Arrange for needed discharge resources and transportation as appropriate  - Identify discharge learning needs (meds, wound care, etc)  - Arrange for interpreters to assist at discharge as needed  - Consider post-discharge preferences of patient/family/discharge partner  - Complete POLST form as appropriate  - Assess patient's ability to be responsible for managing their own health  - Refer to Case Management Department for coordinating discharge planning if the patient needs post-hospital  services based on physician/LIP order or complex needs related to functional status, cognitive ability or social support system  Outcome: Progressing     Problem: CONFUSION  Goal: Confusion, delirium, dementia or psychosis is improved or at baseline  Description: INTERVENTIONS:  - Assess for possible contributors to thought disturbance, including medications, impaired vision or hearing, underlying metabolic abnormalities, dehydration, psychiatric diagnoses, and notify attending MD/LIP  - [place] high risk fall precautions, as indicated  - Provide frequent short contacts to provide reality reorientation, refocusing and direction  - Decrease environmental stimuli, including noise as appropriate  - Monitor and intervene to maintain adequate nutrition, hydration, elimination, sleep and activity  - Consider the need for a sitter if unable to leave patient unattended  - Initiate Spiritual Care consult as indicated  - Consult Pharmacy as needed  Outcome: Progressing   No acute changes in patient status vital signs stable , MRI/MRCP today, fall precaution in place , call light within reach

## 2024-09-14 NOTE — CM/SW NOTE
[facility] Transfer center call spoke with May- facesheet faxed to [phone]. Danielle FAROOQ, 09/14/24, 2:05 PM

## 2024-09-14 NOTE — PROGRESS NOTES
INFECTIOUS DISEASE PROGRESS NOTE  [facility]  part of Merged with [facility] ID PROGRESS NOTE    Cammie SLAUGHTER Degraffenreid Patient Status:  Inpatient    1970 MRN [id number]   Location [facility] 5SW/SE Attending Abimael Mcfadden MD   Hosp Day # 3 PCP Endy Solorzano MD     Subjective:  ROS reviewed. Hgb 7.0. Had BM this AM. Plans for MRCP.    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 Stop vancomycin.  -  Follow fever curve, wbc.  -  Reviewed labs, micro, imaging reports, available old records.  -  Case d/w patient, family, RN.     History of Present Illness:  54-year-old female who initially had abdominal pain with elevated LFTs 2022 with concern for cholecystitis and discharged on antibiotics.  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  Underwent ERCP with a sphincterotomy and stent placement as well as cholecystectomy in 2022 with closure of a cholecysto colonic fistula.  Had delayed follow-up in 2023 with jaundice, elevated LFTs with repeat ERCP showing occluded stent that was removed.  Follow-up in 2023 for colonoscopy and again noted to have jaundice with elevated LFTs with workup showing positive AMA and SMA serologies.  Liver biopsy in 2023 showed evidence of PBC and AIH overlap.  Started on steroids in 2024 with poor follow-up.  Started taper and started on azathioprine 2024 and ursodiol.     Recently admitted in 2024 for chronic hip pain and found to have a  chronic femoral neck fracture with osteoarthritis and on 8/28/2024 underwent left hybrid MALCOLM.  Now presents to hospital on 9/11 for feeling unwell since the surgery with fevers, chills, unwell. Poor PO intake with nausea w/o emesis. Somewhat a poor historian.    Physical Exam:  /48 (BP Location: Left arm)   Pulse 75   Temp 97.5 °F (36.4 °C) (Oral)   Resp 18   Ht 5' 4\" (1.626 m)   Wt 186 lb 9.6 oz (84.6 kg)   LMP 10/17/2018   SpO2 99%   BMI 32.03 kg/m²     Gen:   Awake, in bed  HEENT:  EOMI, +scleral icterus, neck supple  CV/lungs:  RRR, CTAB  Abdom:  Soft, NT/ND, +BS  Skin/extrem:  No rashes, L hip incision covered  :   Garza+  Lines:  PIV+    Laboratory Data: Reviewed    Microbiology: Reviewed    Radiology: Reviewed      ZEENAT [facility]  [phone]  9/14/2024

## 2024-09-14 NOTE — CM/SW NOTE
[facility] Transfer center called- spoke with May- patient is accepted to [facility] by Dr. Ness- however, no bed available at this time. Dnaielle FAROOQ, 09/14/24, 2:23 PM

## 2024-09-14 NOTE — CM/SW NOTE
Call from Dr. Marrero - spoke with Dr. Farris at [facility]- requesting transfer due to Elevated Creat and Bili, for possible liver transplant eval. Danielle FAROOQ, 09/14/24, 2:01 PM

## 2024-09-14 NOTE — CONSULTS
[facility]   ORTHOPAEDIC CONSULT NOTE    CHIEF COMPLAINT/REASON FOR VISIT  Left MALCOLM evaluation for possible infection    CONSULT REQUEST BY: Dr. Mcfadden.    HISTORY OF PRESENT ILLNESS  Cammie Nunn is a 54 year old female, with prior history of autoimmune hepatitis, who underwent left MALCOLM on 8/28/2024 by us for chronic femoral neck fracture, is now presenting to the ER for evaluation after feeling unwell with poor PO intake.     Noted to have liver and renal failure, with elevated WBC count of 20K.      Reports no pain in the left hip recently and no wound drainage. No fevers or chills recently.     Ultrasound of b/l LE demonstrated no dvt.     Review of Systems: Complete 10 point review of systems was performed and negative except for above in HPI.     PMH  Past Medical History:    Chronic obstructive pulmonary disease on long-term inhaled steroid therapy (HCC)    Disorder of liver    autoimmune disease    Essential hypertension    not taking medications    Febrile illness    High blood pressure    off medications since 2022 per patient    High cholesterol    Mirizzi's syndrome (HCC)    Osteoarthritis    Visual impairment        PSH  Past Surgical History:   Procedure Laterality Date    Cholecystectomy      Colonoscopy N/A 12/12/2023    Procedure: COLONOSCOPY;  Surgeon: Jeison Diamond MD;  Location: [facility] ENDOSCOPY    Tonsillectomy         FAMILY HISTORY  Family History   Problem Relation Age of Onset    Lipids Mother     Hypertension Mother     Diabetes Mother     Cancer Mother         SOCIAL HISTORY  Social History     Socioeconomic History    Marital status: Single     Spouse name: Not on file    Number of children: Not on file    Years of education: Not on file    Highest education level: Not on file   Occupational History    Not on file   Tobacco Use    Smoking status: Never     Passive exposure: Never    Smokeless tobacco: Never   Vaping Use    Vaping status: Never Used    Substance and Sexual Activity    Alcohol use: Not Currently     Comment: Every 1 now and then    Drug use: Never    Sexual activity: Not on file   Other Topics Concern    Not on file   Social History Narrative    Not on file     Social Determinants of Health     Financial Resource Strain: Not on file   Food Insecurity: No Food Insecurity (2024)    Food Insecurity     Food Insecurity: Never true   Transportation Needs: Unmet Transportation Needs (2024)    Transportation Needs     Lack of Transportation: Yes     Car Seat: Not on file   Physical Activity: Not on file   Stress: Not on file   Social Connections: Not on file   Housing Stability: Low Risk  (2024)    Housing Stability     Housing Instability: No     Housing Instability Emergency: Not on file     Crib or Bassinette: Not on file       MEDS  Current Facility-Administered Medications on File Prior to Encounter   Medication Dose Route Frequency Provider Last Rate Last Admin    [COMPLETED] acetaminophen (Tylenol Extra Strength) tab 1,000 mg  1,000 mg Oral Once Behery, Omar Atef, MD   1,000 mg at 24 1231    [COMPLETED] hydrocortisone Na succinate PF (Solu-CORTEF) injection 200 mg  200 mg Intravenous Once Endy Solorzano MD   200 mg at 24 1215    [COMPLETED] oxyCODONE immediate release tab 10 mg  10 mg Oral Once Behery, Omar Atef, MD   10 mg at 24 1240    [COMPLETED] ceFAZolin (Ancef) 2g in 10mL IV syringe premix  2 g Intravenous Once Behery, Omar Atef, MD   2 g at 24 1432    [] diphenhydrAMINE (Benadryl) 50 mg/mL  injection 25 mg  25 mg Intravenous Once PRN Behery, Omar Atef, MD        [COMPLETED] ceFAZolin (Ancef) 2g in 10mL IV syringe premix  2 g Intravenous Q8H Behery, Omar Atef, MD   Stopped at 24 0624    [COMPLETED] hydrocortisone Na succinate PF (Solu-CORTEF) injection 200 mg  200 mg Intravenous Q8H [facility] Endy Solorzano MD   200 mg at 24 0435    [COMPLETED] clonidine-EPINEPHrine-ropivacaine-ketorolac  (CERTS) (Duraclon-Adrenalin-Naropin-Toradol) pain cocktail irrigation   Intra-articular Once (Intra-Op) Behery, Omar Atef, MD   Given at 24 1537    [COMPLETED] ketorolac (Toradol) 30 MG/ML injection 30 mg  30 mg Intramuscular Once Ori Cotton MD   30 mg at 24     Current Outpatient Medications on File Prior to Encounter   Medication Sig Dispense Refill    predniSONE 10 MG Oral Tab Take 1 tablet (10 mg total) by mouth daily.      aspirin 81 MG Oral Tab EC Take 1 tablet (81 mg total) by mouth in the morning and 1 tablet (81 mg total) before bedtime. 60 tablet 0    Mycophenolate Mofetil 500 MG Oral Tab Take 1 tablet (500 mg total) by mouth 2 (two) times daily.      [] ursodiol 300 MG Oral Cap Take 2 capsules (600 mg total) by mouth 2 (two) times daily. 360 capsule 3       ALLERGIES  No Known Allergies     PHYSICAL EXAM  BMI:  Body mass index is 32.03 kg/m².  Constitutional:  The patient is alert and awake in no apparent distress.  Psychiatric: Affect appears blunted. Conversational.  Lymphatic: There is moderate pitting edema in both lower legs  Lungs:  The patient's breathing is unlabored.  Heart: Regular heart rate  Abdomen Nondistended, Nontender  Eyes: Scleral icterus    Skin:  Prior left  hip incision is well approximated without evidence of dehiscence, wound breakdown, drainage or surrounding erythema.     Musculoskeletal:    Left lower extremity:  There is no tenderness to palpation over the greater trochanter.    Hip ROM in passive flexion, short and wide arcs, does not elicit pain in the hip    Vascular exam: Toes are warm and well perfused.     LABS  Lab Results   Component Value Date    WBC 21.7 2024    HGB 8.4 2024    HCT 25.5 2024    .0 2024    CREATSERUM 3.00 2024    BUN 74 2024     2024    K 3.0 2024     2024    CO2 21.0 2024     2024    CA 7.6 2024    ALB 2.2 2024     ALKPHO 290 09/13/2024    BILT 26.1 09/13/2024    TP 4.2 09/13/2024     09/13/2024     09/13/2024    INR 1.54 09/13/2024           IMAGING  I personally reviewed and interpreted the patient's imaging. Review of the CT of the left LE does not demonstrate significant appreciable hip fluid collections.      Assessment:   54 year old lady with autoimmune hepatitis, who recently underwent left hybrid MALCOLM 3 weeks ago, for chronic femoral neck fracture , who presents with clinical evidence of hepatic and renal failure, and leukocytosis without fevers, and no left hip wound drainage or pain to suggest prosthetic infection at this time. We discussed this with Dr. Best from infectious disease service. Workup presently ongoing to identify a potential source of infection to explain leukocytosis.     Plan:  - No further workup indicated with regards to the left hip which is not painful and demonstrates a well approximated incision without drainage. If she develops hip pain or wound drainage, we would certainly recommend aspiration of the hip and fluid analysis with cell count and cultures.   - WBAT LLE  - Appreciate co management from the medical team and infectious disease team workup of other potential sources of infection.       Omar Behery, MD  [facility]

## 2024-09-14 NOTE — PROGRESS NOTES
[facility]     Gastroenterology Progress Note    Cammie SLAUGHTER Jianffenreid Patient Status:  Inpatient    1970 MRN [id number]   Location [facility] 5SW/SE Attending Abimael Mcfadden MD   Hosp Day # 3 PCP Endy Solorzano MD       Subjective:   Awaiting MRI. Feels tired. Denies abd pain.       Objective:   Blood pressure 104/48, pulse 71, temperature 97.5 °F (36.4 °C), temperature source Oral, resp. rate 16, height 5' 4\" (1.626 m), weight 186 lb 9.6 oz (84.6 kg), last menstrual period 10/17/2018, SpO2 98%. Body mass index is 32.03 kg/m².    General: awake, alert and oriented, no acute distress  HEENT: moist mucus membranes  PULM: no conversational dyspnea  CARDIOVASCULAR: regular rate and rhythm, the extremities are warm and well perfused  GI: soft, non-tender, non-distended, + BS, no rebound/guarding   EXTREMITIES: trace edema, moving all extremities  SKIN: jaundice  NEURO: answers appropriately    Results:     Lab Results   Component Value Date    WBC 18.7 (H) 2024    HGB 7.0 (L) 2024    HCT 20.4 (L) 2024    .0 (L) 2024    CREATSERUM 3.17 (H) 2024    BUN 75 (H) 2024     2024    K 2.9 (LL) 2024     2024    CO2 21.0 2024     (H) 2024    CA 7.7 (L) 2024    ALB 2.5 (L) 2024    ALKPHO 258 (H) 2024    BILT 28.7 (H) 2024    TP 4.6 (L) 2024     (H) 2024     (H) 2024    PTT 33.1 2024    INR 1.54 (H) 2024    LIP 96 2022    ESRML 16 2024    CRP 4.30 (H) 2024    MG 2.2 2024    PHOS 3.6 2022       MRI MRCP W/3D ONLY (CPT=76376/97861)    Result Date: 2024  CONCLUSION:   Cirrhosis with portal hypertension.  Moderate ascites.  Anasarca.  No ductal dilation.  Wall thickening of the stomach may be reactive or secondary to gastritis.      Dictated by (CST): Macario Dawson MD on 2024 at 12:52 PM      Finalized by (CST): Macario Dawson MD on 9/14/2024 at 1:00 PM          CT LOWER EXT LEFT (WO IV) (CPT=73700)    Result Date: 9/13/2024  CONCLUSION:  1. Stable postoperative changes from a left total hip arthroplasty.  No periprosthetic fracture, dislocation or radiographic evidence of prosthetic loosening.  No periprosthetic fluid collection or other evidence of an infected prosthesis. 2. Redemonstration of diffuse subcutaneous edema that was previously seen throughout the abdomen and extends to the visualized aspects of the lower extremities bilaterally, all of which likely relates to anasarca.  Free pelvic fluid is also similar and may relate to anasarca and/or portal venous hypertension given known history of cirrhosis.   Elm-remote  Dictated by (CST): Mustapha Barrera MD on 9/13/2024 at 6:13 PM     Finalized by (CST): Mustapha Barrera MD on 9/13/2024 at 6:19 PM               Assessment and Plan:   Cammie Nunn is a a(n) 54 year old female w/ a history of PBC/AIH cirrhosis, HTN, HLD, recent femur/hip fracture s/p orthopedic surgery 8/28, who presents with fever and generalized weakness and confusion. Labs on admission notable for TB 26, , , . CRP and procalcitionon high. WBC 36, hgb 6.1 s/p 3 unit prbc to hgb 8.4 this morning, one reported dark stool. INR 5.72 s/p vitamin K now 1.54.      # Leukocytosis with fever at home  - concern for infection, sepsis but source not clear. Blood cultures ngtd, minimal ascites on imaging, she had a recent MRCP outpatient prior to surgery which did not show any overt biliary issue. She has no abdominal pain at present, cannot entirely exclude cholangitis but felt to be less likely. However as source remains unclear, will obtain MRCP to assess biliary system  - appreciate ID evaluation  - continue empiric IV zosyn. IV vanc discontinued.      # Acute on chronic anemia   - likely multifactorial. Recent hip surgery related anemia. Initially  coagulopathic to INR 5 s/p vitamin K improved to 1.52  - monitor for overt bleeding. Dark stool likely related to coagulopathy related mucosal injury which should be self limited and improve with correction of coagulopathy                                                                - empiric PPI  - reserve endoscopic evaluation for melena or significant anemia not responsive to transfusion      # mild encephalopathy  - may be triggered by sepsis vs worsening liver disease  - cont lactulose + xifaxan     # PBC/AIH advanced fibrosis/cirrhosis with concern for liver decompensation after recent hip surgery  - high meld driven by bilirubin and creatinine. Hyperbilirubinemia may be multifactorial from cholestasis of sepsis, uncontrolled underlying autoimmune hepatitis. CT does not suggest acute biliary obstruction but as above will obtain MRCP  - She is not in liver failure as the INR responded to vitamin K and fibrinogen is preserved at 268.   - hold cellcept given concern for sepsis, continue low dose prednisone to prevent adrenal insufficiency  - discussed with Dr Ness primary hepatologist today; low threshold for transfer if worsening clinical status    # JEM  - Cr continues to uptrend  - Urine Na < 10, FeNa pre-renal, no proteinuria.   - Albumin challenge started 9/13, repeat today.   - Nephrology following. Would need transfer to tertiary care facility if dialysis deemed necessary.     Rosio Marrero MD      Addendum: Discussed with OP hepatologist, Dr. Ness, will plan to transfer to [facility] for higher level of care, liver transplant evaluation.

## 2024-09-14 NOTE — CM/SW NOTE
1940:  Per Mel,Transfer Center RN's request via bubble chat this ERCM called Daja in [facility] and informed her to please print pt's PCXR from 9/11, US venous doppler left LE from 9/11, US kidney/bladder from 9/12, CT abdomen and pelvis from 9/13 and CT left LE from 9/13 to file room and call this ERCM once CD(s) printed and this ERCM will come get CD and deliver to pt's floor RN. Daja v/u.    2000:  Daja in [facility] called stating above Radiology CD ready. This ERCM obtained Radiology CD from Daja and confirmed all of the above 5 exams printed on CD.    2015:  This ERCM delivered pt's Radiology CD with the above 5 exams to pt's floor RN Josefina. This ERCM placed Radiology CD in front of pt's hard chart in plastic sleeve. This ERCM informed SARA Rocha to ensure if patient has any additional radiology exams from now until time of transfer another CD will need to be printed to include additional exams and to please ensure that information is relayed to next RN and so on. SARA Rocha v/u.

## 2024-09-15 LAB
BLOOD TYPE BARCODE: 7300
BLOOD TYPE BARCODE: 7300
UNIT VOLUME: 350 ML
UNIT VOLUME: 350 ML

## 2024-09-15 NOTE — DISCHARGE SUMMARY
[facility]  part of Merged with [facility]    Discharge Summary    Cammie Nunn Patient Status:  Inpatient    1970 MRN [id number]   Location [facility] 5SW/SE Attending No att. providers found   Hosp Day # 4 PCP Endy Solorzano MD     Date of Admission: 2024 Disposition: [facility]     Date of Discharge: 9/15/2024  1:05 AM    Admitting Diagnosis: Febrile illness [R50.9]  Acute kidney injury (HCC) [N17.9]  Anemia, unspecified type [D64.9]  Leukocytosis, unspecified type [D72.829]    Hospital Discharge Diagnoses: AIH, PBC, JEM, Acute on chronic blood loss anemia, GI bleed    Lace+ Score: 75  59-90 High Risk  29-58 Medium Risk  0-28   Low Risk.    TCM Follow-Up Recommendation:  LACE > 58: High Risk of readmission after discharge from the hospital.    Problem List:   Patient Active Problem List   Diagnosis    Hyperglycemia    Transaminitis    Hyponatremia    Abdominal pain, acute    Nausea vomiting and diarrhea    Uncontrolled hypertension    Calculus of gallbladder and bile duct with acute cholecystitis, with obstruction    Mirizzi's syndrome (HCC)    Acute cholecystitis    Biliary colic    Choledocholithiasis    Cholecystocolonic fistula    Wound dehiscence    Encounter for postoperative wound care    Open wound    Primary hypertension    Arthritis of right hip    Primary biliary cholangitis (HCC)    Autoimmune hepatitis (HCC)    Hypokalemia    Elevated INR    Hyperbilirubinemia    Status post right hip replacement    Current chronic use of systemic steroids    Febrile illness    Leukocytosis, unspecified type    JEM (acute kidney injury) (HCC)    Anemia, unspecified type    Metabolic acidosis       Reason for Admission: weakness    Physical Exam:   Please see progress note from   Pt transferred prior to being examined on 9/15    History of Present Illness:   Per Dr. Truong Nunn is a(n) 54 year old female, past medical history  significant for primary biliary cholangitis, currently on immunotherapy along with recent left hip replacement presented to the ER complaining of subjective fevers, chills generalized fatigue weakness and nausea along with a poor appetite ongoing for the past 2 days.  Describes the onset as gradual with progressive worsening with no clear aggravating or relieving factors.  Workup in ER indicated worsening of renal function and a drop in hemoglobin.  Patient admits to rather dark stools of late despite her marked hyperbilirubinemia.    Hospital Course:   Acute kidney injury  Likely pre-renal etiology due to poor PO intake and profound anemia  Nephrology has been consulted  Started on bircarb gtt  Avoid nephrotoxins  Renal US essentially WNL  Cr up trending  Transfer to [facility] [place] for higher level of care     Acute blood loss anemia  Pt on ASA BID, reports black stools of late, consider UGI bleed  Pt has rec'd 4 U PRBCs till date  GI has been consulted  Will need endoscopic eval, timing TBD   Hgb above 7 for now     Metabolic acidosis  Due to renal failure  Started on bicarb gtt  Cont to monitor     Primary biliary cholangitis/Autoimmune hepatitis   Worsening of LFTs, GI on board  Cont to monitor   CT abd and pelvis ordered  Will need transfer to [facility] [place] for higher level of care     Leukocytosis  Likely reactive, bcx NGTD  Infectious source unclear at this time, consulted ID and ortho  PJI unlikely   No fevers      Coagulopathy  Likely secondary to liver pathology, given 1 dose Vit K  INR 1.54       Consultations: GI, Renal    Procedures: none    Complications: none    Discharge Condition: Stable    Discharge Medications:      Discharge Medications        START taking these medications        Instructions Prescription details   lactulose 10 GM/15ML Soln  Commonly known as: CHRONULAC      Take 15 mL (10 g total) by mouth 3 (three) times daily.   Refills: 0     rifAXIMin 550 MG Tabs  Commonly known as: Xifaxan      Take 1 tablet  (550 mg total) by mouth 2 (two) times daily.   Refills: 0            CONTINUE taking these medications        Instructions Prescription details   aspirin 81 MG Tbec      Take 1 tablet (81 mg total) by mouth in the morning and 1 tablet (81 mg total) before bedtime.   Quantity: 60 tablet  Refills: 0     Mycophenolate Mofetil 500 MG Tabs  Commonly known as: CELLCEPT      Take 1 tablet (500 mg total) by mouth 2 (two) times daily.   Refills: 0     predniSONE 10 MG Tabs  Commonly known as: Deltasone      Take 1 tablet (10 mg total) by mouth daily.   Refills: 0     ursodiol 300 MG Caps  Commonly known as: Actigall      Take 2 capsules (600 mg total) by mouth 2 (two) times daily.   Refills: 0              Greater than 35 minutes spent, >50% spent counseling re: treatment plan and workup     Abimael Mcfadden MD  9/15/2024

## 2024-09-15 NOTE — PROGRESS NOTES
Report given to Nurse Perez at [facility]. [facility]. Family still at bedside. Paperwork + MRI CD given to [facility] staff. All belongings with family. Patient transferred with 2 IV access to right arm, 0.9 and zosyn infusing.

## 2024-09-15 NOTE — PLAN OF CARE
Called [facility] for transfer to [facility], [address] [place] [address] in [place] room 1405. ETA 1.5 - 2 hours. Spoke with Gabe at [facility].

## 2024-09-15 NOTE — PROGRESS NOTES
Notified by RN about patient being transferred to [facility].  Bed available tonight.  Chart reviewed. Noted physician transfer plan.  Discharge orders placed.  DC summary by Dr TRENT Ashley

## 2024-09-15 NOTE — PLAN OF CARE
Problem: Patient Centered Care  Goal: Patient preferences are identified and integrated in the patient's plan of care  Description: Interventions:  - What would you like us to know as we care for you? Home w/ son   - Provide timely, complete, and accurate information to patient/family  - Incorporate patient and family knowledge, values, beliefs, and cultural backgrounds into the planning and delivery of care  - Encourage patient/family to participate in care and decision-making at the level they choose  - Honor patient and family perspectives and choices  Outcome: Adequate for Discharge     Problem: Patient/Family Goals  Goal: Patient/Family Long Term Goal  Description: Patient's Long Term Goal: Go home better overall     Interventions:  - Transfer to [facility] pending bed availability   - See additional Care Plan goals for specific interventions  Outcome: Adequate for Discharge  Goal: Patient/Family Short Term Goal  Description: Patient's Short Term Goal: Figure out what I going on     Interventions:   - Blood work  -ID, GI, ortho to see   - See additional Care Plan goals for specific interventions  Outcome: Adequate for Discharge     Problem: GENITOURINARY - ADULT  Goal: Absence of urinary retention  Description: INTERVENTIONS:  - Assess patient’s ability to void and empty bladder  - Monitor intake/output and perform bladder scan as needed  - Follow urinary retention protocol/standard of care  - Consider collaborating with pharmacy to review patient's medication profile  - Implement strategies to promote bladder emptying  Outcome: Adequate for Discharge     Problem: METABOLIC/FLUID AND ELECTROLYTES - ADULT  Goal: Hemodynamic stability and optimal renal function maintained  Description: INTERVENTIONS:  - Monitor labs and assess for signs and symptoms of volume excess or deficit  - Monitor intake, output and patient weight  - Monitor urine specific gravity, serum osmolarity and serum sodium as indicated or  ordered  - Monitor response to interventions for patient's volume status, including labs, urine output, blood pressure (other measures as available)  - Encourage oral intake as appropriate  - Instruct patient on fluid and nutrition restrictions as appropriate  Outcome: Adequate for Discharge     Problem: SKIN/TISSUE INTEGRITY - ADULT  Goal: Skin integrity remains intact  Description: INTERVENTIONS  - Assess and document risk factors for pressure ulcer development  - Assess and document skin integrity  - Monitor for areas of redness and/or skin breakdown  - Initiate interventions, skin care algorithm/standards of care as needed  Outcome: Adequate for Discharge  Goal: Incision(s), wounds(s) or drain site(s) healing without S/S of infection  Description: INTERVENTIONS:  - Assess and document risk factors for pressure ulcer development  - Assess and document skin integrity  - Assess and document dressing/incision, wound bed, drain sites and surrounding tissue  - Implement wound care per orders  - Initiate isolation precautions as appropriate  - Initiate Pressure Ulcer prevention bundle as indicated  Outcome: Adequate for Discharge     Problem: MUSCULOSKELETAL - ADULT  Goal: Return mobility to safest level of function  Description: INTERVENTIONS:  - Assess patient stability and activity tolerance for standing, transferring and ambulating w/ or w/o assistive devices  - Assist with transfers and ambulation using safe patient handling equipment as needed  - Ensure adequate protection for wounds/incisions during mobilization  - Obtain PT/OT consults as needed  - Advance activity as appropriate  - Communicate ordered activity level and limitations with patient/family  Outcome: Adequate for Discharge     Problem: PAIN - ADULT  Goal: Verbalizes/displays adequate comfort level or patient's stated pain goal  Description: INTERVENTIONS:  - Encourage pt to monitor pain and request assistance  - Assess pain using appropriate pain  scale  - Administer analgesics based on type and severity of pain and evaluate response  - Implement non-pharmacological measures as appropriate and evaluate response  - Consider cultural and social influences on pain and pain management  - Manage/alleviate anxiety  - Utilize distraction and/or relaxation techniques  - Monitor for opioid side effects  - Notify MD/LIP if interventions unsuccessful or patient reports new pain  - Anticipate increased pain with activity and pre-medicate as appropriate  Outcome: Adequate for Discharge     Problem: RISK FOR INFECTION - ADULT  Goal: Absence of fever/infection during anticipated neutropenic period  Description: INTERVENTIONS  - Monitor WBC  - Administer growth factors as ordered  - Implement neutropenic guidelines  Outcome: Adequate for Discharge     Problem: SAFETY ADULT - FALL  Goal: Free from fall injury  Description: INTERVENTIONS:  - Assess pt frequently for physical needs  - Identify cognitive and physical deficits and behaviors that affect risk of falls.  - [place] fall precautions as indicated by assessment.  - Educate pt/family on patient safety including physical limitations  - Instruct pt to call for assistance with activity based on assessment  - Modify environment to reduce risk of injury  - Provide assistive devices as appropriate  - Consider OT/PT consult to assist with strengthening/mobility  - Encourage toileting schedule  Outcome: Adequate for Discharge     Problem: DISCHARGE PLANNING  Goal: Discharge to home or other facility with appropriate resources  Description: INTERVENTIONS:  - Identify barriers to discharge w/pt and caregiver  - Include patient/family/discharge partner in discharge planning  - Arrange for needed discharge resources and transportation as appropriate  - Identify discharge learning needs (meds, wound care, etc)  - Arrange for interpreters to assist at discharge as needed  - Consider post-discharge preferences of  patient/family/discharge partner  - Complete POLST form as appropriate  - Assess patient's ability to be responsible for managing their own health  - Refer to Case Management Department for coordinating discharge planning if the patient needs post-hospital services based on physician/LIP order or complex needs related to functional status, cognitive ability or social support system  Outcome: Adequate for Discharge     Problem: CONFUSION  Goal: Confusion, delirium, dementia or psychosis is improved or at baseline  Description: INTERVENTIONS:  - Assess for possible contributors to thought disturbance, including medications, impaired vision or hearing, underlying metabolic abnormalities, dehydration, psychiatric diagnoses, and notify attending MD/LIP  - [place] high risk fall precautions, as indicated  - Provide frequent short contacts to provide reality reorientation, refocusing and direction  - Decrease environmental stimuli, including noise as appropriate  - Monitor and intervene to maintain adequate nutrition, hydration, elimination, sleep and activity  - Consider the need for a sitter if unable to leave patient unattended  - Initiate Spiritual Care consult as indicated  - Consult Pharmacy as needed  Outcome: Adequate for Discharge

## 2024-09-15 NOTE — PROGRESS NOTES
Received call from [facility], bed available for patient to room 1405 in [facility]. Family and patient notified of pending  transfer. MD notified for discharge/transfer orders.

## 2024-09-19 NOTE — PAYOR COMM NOTE
9/13 THRU 9/14  CONTINUED STAY REVIEW    Payor: [employer] OUT OF STATE PPO  Subscriber #:  [id number]  Authorization Number: [id number]    Admit date: 9/11/24  Admit time: 11:59 PM       Date/Time Temp Pulse Resp BP SpO2 Weight O2 Device O2 Flow Rate (L/min) [facility]    09/14/24 2023 97.3 °F (36.3 °C) 67 20 121/64 97 % -- None (Room air) --     09/14/24 1700 97.3 °F (36.3 °C) 67 18 109/51 100 % -- None ([facility] air) -- [facility]    09/14/24 1615 97.3 °F (36.3 °C) 69 18 108/57 95 % -- None (Room air) -- [facility]    09/14/24 1515 97.5 °F (36.4 °C) 71 16 113/52 99 % -- None (Room air) --     09/14/24 1415 97.6 °F (36.4 °C) 70 16 93/53 98 % -- None (Room air) --     09/14/24 1343 97.5 °F (36.4 °C) 71 16 111/49 98 % -- None (Room air) --     09/14/24 1235 97.5 °F (36.4 °C) 71 16 104/48 98 % -- None (Room air) --     09/14/24 1003 97.5 °F (36.4 °C) 69 16 110/52 97 % -- None (Room air) --     09/14/24 0528 97.5 °F (36.4 °C) 75 18 112/48 99 % -- None (Room air) -- [facility]         Blood Transfusion Record       Product Unit Status Volume Start End            Transfuse RBC     Not assigned Ordered (Discontinued)         24  [id number]  [id number] Completed 09/14/24 1818 389.58 mL 09/14/24 1353 09/14/24 1700      24  [id number]  [id number] Completed 09/14/24 1657  09/13/24 0931 09/13/24 1430      24  [id number]  [id number] Completed 09/13/24 1218 335.42 mL 09/12/24 0822 09/12/24 1106       24  [id number]  [id number] Completed 09/12/24 0428 304.5 mL 09/12/24 0152 09/12/24 0426                9/13 HOSPITALIST NOTE    Subjective:   Cammie Nunn is a(n) 54 year old female was seen and examined  Pt lying in bed, remains fatigued, jaundiced  Family at bedside  Pt denies any cp, sob, f,c,n,v abd pain or HA  Endorses SOB with activity     Objective:   Blood pressure 111/52, pulse 75, temperature 97.6 °F (36.4 °C), temperature source Oral, resp. rate 18, height 5' 4\" (1.626 m), weight 186 lb 9.6 oz (84.6 kg), last menstrual period  10/17/2018, SpO2 99%.      Assessment and Plan:   Acute kidney injury  Likely pre-renal etiology due to poor PO intake and profound anemia  Nephrology has been consulted  Started on bircarb gtt  Avoid nephrotoxins  Renal US essentially WNL  Cont to monitor cr      Acute blood loss anemia  Pt on ASA BID, reports black stools of late, consider UGI bleed  Pt has rec'd 2 U PRBCs  Got 1 u PRBC in AM due to <7 hgb  GI has been consulted  Will need endoscopic eval, timing TBD      Metabolic acidosis  Due to renal failure  Started on bicarb gtt  Cont to monitor     Primary biliary cholangitis/Autoimmune hepatitis   Worsening of LFTs, GI on board  Cont to monitor   CT abd and pelvis ordered     Leukocytosis  Likely reactive, bcx NGTD  Infectious source unclear at this time, consulted ID and ortho  PJI unlikely   No fevers      Coagulopathy  Likely secondary to liver pathology, given 1 dose Vit K  INR 1.54     Prophylaxis  Supratherapeutic INR, pathological     CODE STATUS  Full              Recent Labs   Lab 09/12/24  0620 09/12/24  1216 09/13/24  0544 09/13/24  1308   RBC 1.93*  --  1.94* 2.68*   HGB 6.1* 7.4* 6.3* 8.4*   HCT 19.9* 21.5* 18.1* 25.5*   .1*  --  93.3 95.1   MCH 31.6  --  32.5 31.3   MCHC 30.7*  --  34.8 32.9   RDW 21.1*  --  20.7* 20.6*   NEPRELIM 24.72*  --  19.30* 18.60*   WBC 29.5*  --  23.3* 21.7*   .0  --  155.0 150.0                  Recent Labs   Lab 09/11/24  2138 09/12/24  0620 09/13/24  0544   * 98 122*   BUN 72* 57* 74*   CREATSERUM 2.71* 2.77* 3.00*   EGFRCR 20* 19* 18*   CA 7.9* 8.1* 7.6*    139 140   K 3.6 3.7 3.0*    112 108   CO2 16.0* 14.0* 21.0     9/13 ID CONSULT NOTE    Reason for Consultation:  Leukocytosis     ASSESSMENT:     Antibiotics: IV vancomycin, zosyn     # Acute leukocytosis with elevated PCT - BCx ngtd; possibly reactive to anemia               - CT A/P with cirrhosis with small volume ascites     # L MALCOLM 8/28/24 w/o signs of infection                - doppler w/o DVT  # Signs of liver failure with elevated INR; also elevated LFTs, bilirubin  # JEM - US w/o acute findings  # 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  # Immunocompromised host        PLAN:     -  IV vancomycin, zosyn  -  f/up cx  -  possibly discontinue IV vancomycin soon  -  Follow fever curve, wbc  -  Reviewed labs, micro, imaging reports, available old records  -  d/w patient, son, Primary    9/13[facility] Note:  Reason for transfer:      Continuity and higher level of care     Request initiated by:     Dr Abimael Mcfadden        Active Acute Medical issue:        Acute kidney injury  Likely pre-renal etiology due to poor PO intake and profound anemia  Nephrology has been consulted  Started on bircarb gtt  Avoid nephrotoxins  Renal US ordered     Acute blood loss anemia  Pt on ASA BID, reports black stools of late, consider UGI bleed  Pt has rec'd 2 U PRBCs  Hgb >7  GI has been consulted  Will need endoscopic eval, timing TBD      Metabolic acidosis  Due to renal failure  Started on bicarb gtt  Cont to monitor     Primary biliary cholangitis/Autoimmune hepatitis   Worsening of LFTs, GI on board  Cont to monitor      Leukocytosis  Likely reactive, bcx pending   No fevers      Coagulopathy  Likely secondary to liver pathology, given 1 dose Vit K  INR 2.46     Prophylaxis  Supratherapeutic INR, pathological           Anticipated Transfer Plan:   [facility] called, plan of care discussed with transfer center RN.  Face sheet faxed and copy of imaging has been requested for transport, bedside RN was updated. Patient/family was notified  by the provider and are agreeable to the plan.  [facility] is facilitating coordination of care and will follow.      [facility] #: [phone]     9/13 GI CONSULT NOTE  Reason for Consultation:  PBC/AIH liver decompensation      History of Present Illness:  Cammie SLAUGHTER  Arline is a a(n) 54 year old female w/ a history of PBC/AIH cirrhosis, HTN, HLD, recent femur/hip fracture s/p orthopedic surgery 8/28, who presents with fever and generalized weakness and confusion. Two sons in room. States over the past week mom has not been feeling well with reduced appetite, poor energy, fatigue and generalized weakness. She denies abdominal pain, nausea or emesis. No diarrhea. She has been constipated. Bowel movement over the past two days has been dark however. No hematemesis or hematochezia. Fevers at home, none since admission. Also notes lower extremity swelling.      She recently switched from AZA to cellcept after no improvement in LFTs in late June. Since then no improvement seen in LFTs.        Assessment & Plan   Cammie Nunn is a a(n) 54 year old female w/ a history of PBC/AIH cirrhosis, HTN, HLD, recent femur/hip fracture s/p orthopedic surgery 8/28, who presents with fever and generalized weakness and confusion. Labs on admission notable for TB 26, , , . CRP and procalcitionon high. WBC 36, hgb 6.1 s/p 3 unit prbc to hgb 8.4 this morning, one reported dark stool. INR 5.72 s/p vitamin K now 1.54.      # Leukocytosis with fever at home  - concern for infection, sepsis but source not clear. Blood cultures ngtd, minimal ascites on imaging,  she had a recent MRCP outpatient prior to surgery which did not show any overt biliary issue. She has no abdominal pain at present, cannot entirely exclude cholangitis but felt to be less likely. However as source remains unclear, will obtain MRCP to assess biliary system  - appreciate ID evaluation  - continue empiric IV vanco and zosyn     # Acute on chronic anemia   - likely multifactorial. Recent hip surgery related anemia. Initially coagulopathic to INR 5 s/p vitamin K improved to 1.52  - monitor for overt bleeding. Dark stool today likely related to coagulopathy related mucosal injury which should be self  limited and improve with correction of coagulopathy                                                                - empiric PPI  - reserve endoscopic evaluation for melena or significant anemia not responsive to transfusion      # mild encephalopathy  - may be triggered by sepsis vs worsening liver disease  - start lactulose + xifaxan     # PBC/AIH advanced fibrosis/cirrhosis with concern for liver decompensation after recent hip surgery  - high meld driven by bilirubin and creatinine. Hyperbilirubinemia may be multifactorial from cholestasis of sepsis, uncontrolled underlying autoimmune hepatitis. CT does not suggest acute biliary obstruction but as above will obtain MRCP  - She is not in liver failure as the INR responded to vitamin K and fibrinogen is preserved at 268.   - hold cellcept given concern for sepsis, continue low dose prednisone to prevent adrenal insufficiency  - discussed with Dr Ness primary hepatologist today; low threshold for transfer if worsening clinical status      D/w Dr Mcfadden    9/13 ORTHO CONSULT NOTE    CHIEF COMPLAINT/REASON FOR VISIT  Left MALCOLM evaluation for possible infection     CONSULT REQUEST BY: Dr. Mcfadden.     HISTORY OF PRESENT ILLNESS  Cammie Nunn is a 54 year old female, with prior history of autoimmune hepatitis, who underwent left MALCOLM on 8/28/2024 by us for chronic femoral neck fracture, is now presenting to the ER for evaluation after feeling unwell with poor PO intake.      Noted to have liver and renal failure, with elevated WBC count of 20K.       Reports no pain in the left hip recently and no wound drainage. No fevers or chills recently.      Ultrasound of b/l LE demonstrated no dvt.          Assessment:   54 year old lady with autoimmune hepatitis, who recently underwent left hybrid MALCOLM 3 weeks ago, for chronic femoral neck fracture , who presents with clinical evidence of hepatic and renal failure, and leukocytosis without fevers, and no left hip  wound drainage or pain to suggest prosthetic infection at this time. We discussed this with Dr. Best from infectious disease service. Workup presently ongoing to identify a potential source of infection to explain leukocytosis.      Plan:  - No further workup indicated with regards to the left hip which is not painful and demonstrates a well approximated incision without drainage. If she develops hip pain or wound drainage, we would certainly recommend aspiration of the hip and fluid analysis with cell count and cultures.   - WBAT LLE  - Appreciate co management from the medical team and infectious disease team workup of other potential sources of infection.     9/14 ID NOTE    Subjective:  ROS reviewed. Hgb 7.0. Had BM this AM. Plans for MRCP.     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 Stop vancomycin.  -  Follow fever curve, wbc.  -  Reviewed labs, micro, imaging reports, available old records.  -  Case d/w patient, family, RN.     9/14 INTERNAL MED NOTE    Subjective:   54 year old female, following for JEM.      Denies shortness of breath.  Feeling tired.  Urine output 700 mL per 24 hours.     Sons at bedside.      Review of Systems:   Review of Systems   Constitutional:  Positive for fatigue. Negative for chills and fever.       Objective:   Temp:  [97.5 °F (36.4 °C)-98.1 °F (36.7 °C)] 97.5 °F (36.4 °C)  Pulse:  [69-78] 69  Resp:  [16-18] 16  BP: (110-119)/(48-54) 110/52  SpO2:  [93 %-99 %] 97 %  SpO2: 97 %      Intake/Output Summary (Last 24 hours) at 9/14/2024  1213  Last data filed at 9/14/2024 0700      Gross per 24 hour   Intake 874.17 ml   Output 700 ml   Net 174.17 ml             Recent Labs   Lab 09/13/24  0544 09/13/24  1308 09/14/24  0614   RBC 1.94* 2.68* 2.25*   HGB 6.3* 8.4* 7.0*   HCT 18.1* 25.5* 20.4*   MCV 93.3 95.1 90.7   NEPRELIM 19.30* 18.60* 15.86*   WBC 23.3* 21.7* 18.7*   .0 150.0 131.0*              Recent Labs   Lab 09/11/24  2138 09/12/24  0620 09/13/24  0544 09/13/24  1437 09/14/24  0614   * 98 122*  --  127*   BUN 72* 57* 74*  --  75*   CREATSERUM 2.71* 2.77* 3.00*  --  3.17*   CA 7.9* 8.1* 7.6*  --  7.7*   ALB 2.4*  --  2.2*  --  2.5*    139 140  --  140   K 3.6 3.7 3.0* 3.0* 2.9*    112 108  --  108   CO2 16.0* 14.0* 21.0  --  21.0   ALKPHO 325*  --  290*  --  258*   *  --  192*  --  161*   *  --  136*  --  123*   BILT 26.3*  --  26.1*  --  28.7*   TP 5.0*  --  4.2*  --  4.6*          Assessment and Plan:      54 year old female with history of autoimmune hepatitis and primary biliary cholangitis, s/p left hip replacement surgery on 8/28/24, who presented with generalized weakness and decreased oral intake for a few days.      JEM:   Likely due to relative hypotension, pre-renal and anemia  Cr 3.17 today, slightly increased.   Cont NS at 50 ml/hr.   Monitor I/Os.      Hypokalemia:   Potassium chloride 40 meq x 1 ordered  .  Autoimmune hepatitis:   Cellcept on hold due to infection.   Planning for MRCP today.  GI following.      Leukocytosis:   No clear source identified.   On Zosyn. Vanc stopped.   ID following     9/14 GI NOTE    Subjective:   Awaiting MRI. Feels tired. Denies abd pain.         Objective:   Blood pressure 104/48, pulse 71, temperature 97.5 °F (36.4 °C), temperature source Oral, resp. rate 16, height 5' 4\" (1.626 m), weight 186 lb 9.6 oz (84.6 kg), last menstrual period 10/17/2018, SpO2 98%. Body mass index is 32.03 kg/m².      Assessment and Plan:   Cammie Nunn is a a(n) 54  year old female w/ a history of PBC/AIH cirrhosis, HTN, HLD, recent femur/hip fracture s/p orthopedic surgery 8/28, who presents with fever and generalized weakness and confusion. Labs on admission notable for TB 26, , , . CRP and procalcitionon high. WBC 36, hgb 6.1 s/p 3 unit prbc to hgb 8.4 this morning, one reported dark stool. INR 5.72 s/p vitamin K now 1.54.      # Leukocytosis with fever at home  - concern for infection, sepsis but source not clear. Blood cultures ngtd, minimal ascites on imaging, she had a recent MRCP outpatient prior to surgery which did not show any overt biliary issue. She has no abdominal pain at present, cannot entirely exclude cholangitis but felt to be less likely. However as source remains unclear, will obtain MRCP to assess biliary system  - appreciate ID evaluation  - continue empiric IV zosyn. IV vanc discontinued.      # Acute on chronic anemia   - likely multifactorial. Recent hip surgery related anemia. Initially coagulopathic to INR 5 s/p vitamin K improved to 1.52  - monitor for overt bleeding. Dark stool likely related to coagulopathy related mucosal injury which should be self limited and improve with correction of coagulopathy                                                                - empiric PPI  - reserve endoscopic evaluation for melena or significant anemia not responsive to transfusion      # mild encephalopathy  - may be triggered by sepsis vs worsening liver disease  - cont lactulose + xifaxan     # PBC/AIH advanced fibrosis/cirrhosis with concern for liver decompensation after recent hip surgery  - high meld driven by bilirubin and creatinine. Hyperbilirubinemia may be multifactorial from cholestasis of sepsis, uncontrolled underlying autoimmune hepatitis. CT does not suggest acute biliary obstruction but as above will obtain MRCP  - She is not in liver failure as the INR responded to vitamin K and fibrinogen is preserved at 268.   - hold  cellcept given concern for sepsis, continue low dose prednisone to prevent adrenal insufficiency  - discussed with Dr Ness primary hepatologist today; low threshold for transfer if worsening clinical status     # JEM  - Cr continues to uptrend  - Urine Na < 10, FeNa pre-renal, no proteinuria.   - Albumin challenge started 9/13, repeat today.   - Nephrology following. Would need transfer to tertiary care facility if dialysis deemed necessary.      9/14 TRANSFER NOTE  [facility] Transfer center called- spoke with May- patient is accepted to [facility] by Dr. Ness- however, no bed available at this time. Danielle FAROOQ, 09/14/24, 2:23 PM

## 2024-09-19 NOTE — PAYOR COMM NOTE
--------------  DISCHARGE REVIEW    Payor: [facility] OUT OF STATE PPO  Subscriber #:  [id number]  Authorization Number: [id number]    Admit date: 24  Admit time:  11:59 PM  Discharge Date: 9/15/2024  1:05 AM     Admitting Physician: Marilyn Delgado MD  Attending Physician:  No att. providers found  Primary Care Physician: No primary care provider on file.          Discharge Summary Notes        Discharge Summary signed by Abimael Mcfadden MD at 9/15/2024  9:10 AM       Author: Abimael Mcfadden MD Specialty: HOSPITALIST, Internal Medicine Author Type: Physician    Filed: 9/15/2024  9:10 AM Date of Service: 9/15/2024  9:01 AM Status: Signed    : Abimael Mcfadden MD (Physician)           [facility]  [facility] of [facility]    Discharge Summary    Cammie Villarffenreid Patient Status:  Inpatient    1970 MRN [id number]   Location [facility] 5SW/SE Attending No att. providers found   Hosp Day # 4 PCP Endy Solorzano MD     Date of Admission: 2024 Disposition: [facility]     Date of Discharge: 9/15/2024  1:05 AM    Admitting Diagnosis: Febrile illness [R50.9]  Acute kidney injury (HCC) [N17.9]  Anemia, unspecified type [D64.9]  Leukocytosis, unspecified type [D72.829]    Hospital Discharge Diagnoses: AIH, PBC, JEM, Acute on chronic blood loss anemia, GI bleed    Lace+ Score: 75  59-90 High Risk  29-58 Medium Risk  0-28   Low Risk.    TCM Follow-Up Recommendation:  LACE > 58: High Risk of readmission after discharge from the hospital.    Problem List:   Patient Active Problem List   Diagnosis    Hyperglycemia    Transaminitis    Hyponatremia    Abdominal pain, acute    Nausea vomiting and diarrhea    Uncontrolled hypertension    Calculus of gallbladder and bile duct with acute cholecystitis, with obstruction    Mirizzi's syndrome (HCC)    Acute cholecystitis    Biliary colic    Choledocholithiasis    Cholecystocolonic fistula    Wound dehiscence     Encounter for postoperative wound care    Open wound    Primary hypertension    Arthritis of right hip    Primary biliary cholangitis (HCC)    Autoimmune hepatitis (HCC)    Hypokalemia    Elevated INR    Hyperbilirubinemia    Status post right hip replacement    Current chronic use of systemic steroids    Febrile illness    Leukocytosis, unspecified type    JEM (acute kidney injury) (HCC)    Anemia, unspecified type    Metabolic acidosis       Reason for Admission: weakness    Physical Exam:   Please see progress note from 9/14  Pt transferred prior to being examined on 9/15    History of Present Illness:   Per Dr. Truong Bonds SUKHJINDER Nunn is a(n) 54 year old female, past medical history significant for primary biliary cholangitis, currently on immunotherapy along with recent left hip replacement presented to the ER complaining of subjective fevers, chills generalized fatigue weakness and nausea along with a poor appetite ongoing for the past 2 days.  Describes the onset as gradual with progressive worsening with no clear aggravating or relieving factors.  Workup in ER indicated worsening of renal function and a drop in hemoglobin.  Patient admits to rather dark stools of late despite her marked hyperbilirubinemia.    Hospital Course:   Acute kidney injury  Likely pre-renal etiology due to poor PO intake and profound anemia  Nephrology has been consulted  Started on bircarb gtt  Avoid nephrotoxins  Renal US essentially WNL  Cr up trending  Transfer to [facility] for higher level of care     Acute blood loss anemia  Pt on ASA BID, reports black stools of late, consider UGI bleed  Pt has rec'd 4 U PRBCs till date  GI has been consulted  Will need endoscopic eval, timing TBD   Hgb above 7 for now     Metabolic acidosis  Due to renal failure  Started on bicarb gtt  Cont to monitor     Primary biliary cholangitis/Autoimmune hepatitis   Worsening of LFTs, GI on board  Cont to monitor   CT abd and pelvis ordered  Will  need transfer to [facility] for higher level of care     Leukocytosis  Likely reactive, bcx NGTD  Infectious source unclear at this time, consulted ID and ortho  PJI unlikely   No fevers      Coagulopathy  Likely secondary to liver pathology, given 1 dose Vit K  INR 1.54       Consultations: GI, Renal    Procedures: none    Complications: none    Discharge Condition: Stable    Discharge Medications:      Discharge Medications        START taking these medications        Instructions Prescription details   lactulose 10 GM/15ML Soln  Commonly known as: CHRONULAC      Take 15 mL (10 g total) by mouth 3 (three) times daily.   Refills: 0     rifAXIMin 550 MG Tabs  Commonly known as: Xifaxan      Take 1 tablet (550 mg total) by mouth 2 (two) times daily.   Refills: 0            CONTINUE taking these medications        Instructions Prescription details   aspirin 81 MG Tbec      Take 1 tablet (81 mg total) by mouth in the morning and 1 tablet (81 mg total) before bedtime.   Quantity: 60 tablet  Refills: 0     Mycophenolate Mofetil 500 MG Tabs  Commonly known as: CELLCEPT      Take 1 tablet (500 mg total) by mouth 2 (two) times daily.   Refills: 0     predniSONE 10 MG Tabs  Commonly known as: Deltasone      Take 1 tablet (10 mg total) by mouth daily.   Refills: 0     ursodiol 300 MG Caps  Commonly known as: Actigall      Take 2 capsules (600 mg total) by mouth 2 (two) times daily.   Refills: 0              Greater than 35 minutes spent, >50% spent counseling re: treatment plan and workup     Abimael Mcfadden MD  9/15/2024      Electronically signed by Abimael Mcfadden MD on 9/15/2024  9:10 AM         REVIEWER COMMENTS

## (undated) DEVICE — MEDI-VAC NON-CONDUCTIVE SUCTION TUBING: Brand: CARDINAL HEALTH

## (undated) DEVICE — STERILE LATEX POWDER-FREE SURGICAL GLOVESWITH NITRILE COATING: Brand: PROTEXIS

## (undated) DEVICE — CONTAINER UBG 200ML POR CLTH

## (undated) DEVICE — 1016 S-DRAPE IRRIG POUCH 10/BOX: Brand: STERI-DRAPE™

## (undated) DEVICE — KIT ENDO ORCAPOD 160/180/190

## (undated) DEVICE — HOOD: Brand: FLYTE

## (undated) DEVICE — SOLUTION IRRIG 1000ML 0.9% NACL USP BTL

## (undated) DEVICE — KIT CLEAN ENDOKIT 1.1OZ GOWNX2

## (undated) DEVICE — SUT VICRYL 2-0 SH J417H

## (undated) DEVICE — BALLOON DILATATION CATHETER: Brand: HURRICANE™ RX

## (undated) DEVICE — SUT SILK 3-0 SH K832H

## (undated) DEVICE — PACK CDS ANTERIOR HIP

## (undated) DEVICE — Device: Brand: DUAL NARE NASAL CANNULAE FEMALE LUER CON 7FT O2 TUBE

## (undated) DEVICE — ENSEAL 20 CM SHAFT, LARGE JAW: Brand: ENSEAL X1

## (undated) DEVICE — ACROBAT 2 CALIBRATED TIP WIRE GUIDE: Brand: ACROBAT

## (undated) DEVICE — Device: Brand: STABLECUT®

## (undated) DEVICE — MEGADYNE E-Z CLEAN BLADE 2.75"

## (undated) DEVICE — ADHESIVE SKIN TOP FOR WND CLSR DERMBND ADV

## (undated) DEVICE — PACK,UNIVERSAL,SPLIT,II: Brand: MEDLINE

## (undated) DEVICE — SNAPLOC WIRE GUIDE LOCKING DEVICE: Brand: SNAPLOC

## (undated) DEVICE — REM POLYHESIVE ADULT PATIENT RETURN ELECTRODE: Brand: VALLEYLAB

## (undated) DEVICE — CONMED SCOPE SAVER BITE BLOCK, 20X27 MM: Brand: SCOPE SAVER

## (undated) DEVICE — PROXIMATE SKIN STAPLERS (35 WIDE) CONTAINS 35 STAINLESS STEEL STAPLES (FIXED HEAD): Brand: PROXIMATE

## (undated) DEVICE — SUT SILK 0 A306H

## (undated) DEVICE — 60 ML SYRINGE REGULAR TIP: Brand: MONOJECT

## (undated) DEVICE — BOWL AND CEMENT CARTRIDGE WITH BREAKAWAY FEMORAL NOZZLE AND MEDIUM PRESSURIZER: Brand: ACM

## (undated) DEVICE — CAUTERY TIP TEFLON MEGADYNE

## (undated) DEVICE — LINE MNTR ADLT SET O2 INTMD

## (undated) DEVICE — SUT PDS II 4-0 SH Z315H

## (undated) DEVICE — BIPOLAR SEALER 23-112-1 AQM 6.0: Brand: AQUAMANTYS™

## (undated) DEVICE — SUT MCRYL 3-0 27IN ABSRB UD L24MM PS-1

## (undated) DEVICE — 2T11 #2 PDO 36 X 36: Brand: 2T11 #2 PDO 36 X 36

## (undated) DEVICE — EVACUATOR URO RELIVAC 100CC

## (undated) DEVICE — SUT PDS II 4-0 RB-1 Z304H

## (undated) DEVICE — MEDI-VAC NON-CONDUCTIVE SUCTION TUBING 6MM X 1.8M (6FT.) L: Brand: CARDINAL HEALTH

## (undated) DEVICE — LOCKING DEVICE RX & BIOPSY CAP

## (undated) DEVICE — WOUND RETRACTOR AND PROTECTOR: Brand: ALEXIS O WOUND PROTECTOR-RETRACTOR

## (undated) DEVICE — SPHINCTEROTOME: Brand: DREAMTOME™ RX 44

## (undated) DEVICE — DRESSING SUR 9X25CM SIL SP CVR WTRPRF VIR

## (undated) DEVICE — APPLICATOR PREP 26ML CHG 2% ISO ALC 70%

## (undated) DEVICE — VIOLET BRAIDED (POLYGLACTIN 910), SYNTHETIC ABSORBABLE SUTURE: Brand: COATED VICRYL

## (undated) DEVICE — DRAPE SHEET LAPCHOLE 124X100X7

## (undated) DEVICE — FUSION QUATTRO EXTRACTION BALLOON: Brand: FUSION QUATTRO

## (undated) DEVICE — HANDPIECE SET WITH HIGH FLOW TIP AND SUCTION TUBE: Brand: INTERPULSE

## (undated) DEVICE — GAMMEX® PI HYBRID SIZE 7.5, STERILE POWDER-FREE SURGICAL GLOVE, POLYISOPRENE AND NEOPRENE BLEND: Brand: GAMMEX

## (undated) DEVICE — SYSTEM GRIPPER SELF RETRACTING

## (undated) DEVICE — DRAPE,SPLIT ,77X120: Brand: MEDLINE

## (undated) DEVICE — SOLUTION PREP 26ML 0.7% POVACRYLEX 74% ISO

## (undated) DEVICE — DRAIN INCS 10MM 20CMX10MM RLVC

## (undated) DEVICE — YANKAUER SUCTION INSTRUMENT NO CONTROL VENT, BULB TIP, CLEAR: Brand: YANKAUER

## (undated) DEVICE — GUIDEWIRE NOVAGOLD STRGHT TIP

## (undated) DEVICE — SHEET,DRAPE,53X77,STERILE: Brand: MEDLINE

## (undated) DEVICE — SYRINGE MED 20ML STD CLR PLAS LL TIP N CTRL

## (undated) DEVICE — SOLUTION IRRIG 3000ML 0.9% NACL FLX CONT

## (undated) DEVICE — MINOR GENERAL: Brand: MEDLINE INDUSTRIES, INC.

## (undated) DEVICE — IMPLANTABLE DEVICE
Type: IMPLANTABLE DEVICE | Site: HIP | Status: NON-FUNCTIONAL
Brand: REFOBACIN® BONE CEMENT R

## (undated) DEVICE — GAMMEX® PI HYBRID SIZE 8, STERILE POWDER-FREE SURGICAL GLOVE, POLYISOPRENE AND NEOPRENE BLEND: Brand: GAMMEX

## (undated) DEVICE — BONE PREPARATION KIT: Brand: BIOPREP

## (undated) DEVICE — SUT ETHILON 2-0 FS 664H

## (undated) DEVICE — SUT MCRYL 2-0 36IN CT-1 ABSRB UD L36MM TAPR P

## (undated) DEVICE — NEEDLE SPNL 18GA L3.5IN W/ QNCKE SHARPER BVL

## (undated) DEVICE — SIDE ARM ADAPTER: Brand: COOK

## (undated) DEVICE — GAMMEX® NON-LATEX PI ORTHO SIZE 8.5, STERILE POLYISOPRENE POWDER-FREE SURGICAL GLOVE: Brand: GAMMEX

## (undated) DEVICE — SKIN PREP TRAY 4 COMPARTM TRAY: Brand: MEDLINE INDUSTRIES, INC.

## (undated) DEVICE — HOOD, PEEL-AWAY: Brand: FLYTE

## (undated) DEVICE — DRESSING HYDRCOLL 3.25X6IN TRNSLUC

## (undated) DEVICE — SUT SILK 2-0 SA85H

## (undated) DEVICE — ABDOMINAL BINDER: Brand: DEROYAL

## (undated) DEVICE — ENCORE 26 INFLATION DEVICE

## (undated) DEVICE — SUT SILK 2-0 SH K833H

## (undated) DEVICE — FEMORAL CANAL BRUSH, IRRIGATION/SUCTION

## (undated) DEVICE — SUT PDS II 1-0 XLH Z881G

## (undated) DEVICE — RETRIEVAL BALLOON CATHETER: Brand: EXTRACTOR™ PRO RX

## (undated) DEVICE — SOLUTION  .9 1000ML BTL

## (undated) DEVICE — ERCP CANNULA - 5-4-3 TIP: Brand: CONTOUR ERCP CANNULA

## (undated) DEVICE — ANTIBACTERIAL UNDYED BRAIDED (POLYGLACTIN 910), SYNTHETIC ABSORBABLE SUTURE: Brand: COATED VICRYL

## (undated) DEVICE — SUT ETHBND XL 2 30IN V-37 NABSRB GRN 40MM 1/2

## (undated) DEVICE — SNARE CAPTIFLEX MICRO-OVL OLY

## (undated) NOTE — LETTER
[address], [place], [place]  Authorization for Surgical Operation and Procedure                                                                                           1. I hereby authorize Cruzito Javier MD, my physician and his/her assistants (if applicable), which may include medical students, residents, and/or fellows, to perform the following surgical operation/ procedure and administer such anesthesia as may be determined necessary by my physician: Operation/Procedure name (s) ENDOSCOPIC RETROGRADE CHOLANGIOPANCREATOGRAPHY (ERCP) on Cammie Nunn   2. I recognize that during the surgical operation/procedure, unforeseen conditions may necessitate additional or different procedures than those listed above. I, therefore, further authorize and request that the above-named surgeon, assistants, or designees perform such procedures as are, in their judgment, necessary and desirable. 3.   My surgeon/physician has discussed prior to my surgery the potential benefits, risks and side effects of this procedure; the likelihood of achieving goals; and potential problems that might occur during recuperation. They also discussed reasonable alternatives to the procedure, including risks, benefits, and side effects related to the alternatives and risks related to not receiving this procedure. I have had all my questions answered and I acknowledge that no guarantee has been made as to the result that may be obtained. 4.   Should the need arise during my operation/procedure, which includes change of level of care prior to discharge, I also consent to the administration of blood and/or blood products. Further, I understand that despite careful testing and screening of blood or blood products by collecting agencies, I may still be subject to ill effects as a result of receiving a blood transfusion and/or blood products.   The following are some, but not all, of the potential risks that can occur: fever and allergic reactions, hemolytic reactions, transmission of diseases such as Hepatitis, AIDS and Cytomegalovirus (CMV) and fluid overload. In the event that I wish to have an autologous transfusion of my own blood, or a directed donor transfusion, I will discuss this with my physician. Check only if Refusing Blood or Blood Products  I understand refusal of blood or blood products as deemed necessary by my physician may have serious consequences to my condition to include possible death. I hereby assume responsibility for my refusal and release the hospital, its personnel, and my physicians from any responsibility for the consequences of my refusal.    o  Refuse   5. I authorize the use of any specimen, organs, tissues, body parts or foreign objects that may be removed from my body during the operation/procedure for diagnosis, research or teaching purposes and their subsequent disposal by hospital authorities. I also authorize the release of specimen test results and/or written reports to my treating physician on the hospital medical staff or other referring or consulting physicians involved in my care, at the discretion of the Pathologist or my treating physician. 6.   I consent to the photographing or videotaping of the operations or procedures to be performed, including appropriate portions of my body for medical, scientific, or educational purposes, provided my identity is not revealed by the pictures or by descriptive texts accompanying them. If the procedure has been photographed/videotaped, the surgeon will obtain the original picture, image, videotape or CD. The hospital will not be responsible for storage, release or maintenance of the picture, image, tape or CD.    7.   I consent to the presence of a  or observers in the operating room as deemed necessary by my physician or their designees.     8.   I recognize that in the event my procedure results in extended X-Ray/fluoroscopy time, I may develop a skin reaction. 9. If I have a Do Not Attempt Resuscitation (DNAR) order in place, that status will be suspended while in the operating room, procedural suite, and during the recovery period unless otherwise explicitly stated by me (or a person authorized to consent on my behalf). The surgeon or my attending physician will determine when the applicable recovery period ends for purposes of reinstating the DNAR order. 10. Patients having a sterilization procedure: I understand that if the procedure is successful the results will be permanent and it will therefore be impossible for me to inseminate, conceive, or bear children. I also understand that the procedure is intended to result in sterility, although the result has not been guaranteed. 11. I acknowledge that my physician has explained sedation/analgesia administration to me including the risk and benefits I consent to the administration of sedation/analgesia as may be necessary or desirable in the judgment of my physician. I CERTIFY THAT I HAVE READ AND FULLY UNDERSTAND THE ABOVE CONSENT TO OPERATION and/or OTHER PROCEDURE.     _________________________________________ _________________________________     ___________________________________  Signature of Patient     Signature of Responsible Person                   Printed Name of Responsible Person                              _________________________________________ ______________________________        ___________________________________  Signature of Witness         Date  Time         Relationship to Patient    STATEMENT OF PHYSICIAN My signature below affirms that prior to the time of the procedure; I have explained to the patient and/or his/her legal representative, the risks and benefits involved in the proposed treatment and any reasonable alternative to the proposed treatment.  I have also explained the risks and benefits involved in refusal of the proposed treatment and alternatives to the proposed treatment and have answered the patient's questions.  If I have a significant financial interest in a co-management agreement or a significant financial interest in any product or implant, or other significant relationship used in this procedure/surgery, I have disclosed this and had a discussion with my patient.     _______________________________________________________________ _____________________________  Viridiana Dear of Physician)                                                                                         (Date)                                   (Time)  Patient Name: Washington Beltran    : 1970   Printed: 2022      Medical Record #: [id number]                                              Page 1 of 1

## (undated) NOTE — LETTER
[address], [place], [place]  Authorization for Surgical Operation and Procedure                                                                                           I hereby authorize Gordo Spence MD, my physician and his/her assistants (if applicable), which may include medical students, residents, and/or fellows, to perform the following surgical operation/ procedure and administer such anesthesia as may be determined necessary by my physician: Operation/Procedure name (s) COLONOSCOPY on Cammie B Degraffenreid   2. I recognize that during the surgical operation/procedure, unforeseen conditions may necessitate additional or different procedures than those listed above. I, therefore, further authorize and request that the above-named surgeon, assistants, or designees perform such procedures as are, in their judgment, necessary and desirable. 3.   My surgeon/physician has discussed prior to my surgery the potential benefits, risks and side effects of this procedure; the likelihood of achieving goals; and potential problems that might occur during recuperation. They also discussed reasonable alternatives to the procedure, including risks, benefits, and side effects related to the alternatives and risks related to not receiving this procedure. I have had all my questions answered and I acknowledge that no guarantee has been made as to the result that may be obtained. 4.   Should the need arise during my operation/procedure, which includes change of level of care prior to discharge, I also consent to the administration of blood and/or blood products. Further, I understand that despite careful testing and screening of blood or blood products by collecting agencies, I may still be subject to ill effects as a result of receiving a blood transfusion and/or blood products.   The following are some, but not all, of the potential risks that can occur: fever and allergic reactions, hemolytic reactions, transmission of diseases such as Hepatitis, AIDS and Cytomegalovirus (CMV) and fluid overload. In the event that I wish to have an autologous transfusion of my own blood, or a directed donor transfusion, I will discuss this with my physician. Check only if Refusing Blood or Blood Products  I understand refusal of blood or blood products as deemed necessary by my physician may have serious consequences to my condition to include possible death. I hereby assume responsibility for my refusal and release the hospital, its personnel, and my physicians from any responsibility for the consequences of my refusal.    o  Refuse   5. I authorize the use of any specimen, organs, tissues, body parts or foreign objects that may be removed from my body during the operation/procedure for diagnosis, research or teaching purposes and their subsequent disposal by hospital authorities. I also authorize the release of specimen test results and/or written reports to my treating physician on the hospital medical staff or other referring or consulting physicians involved in my care, at the discretion of the Pathologist or my treating physician. 6.   I consent to the photographing or videotaping of the operations or procedures to be performed, including appropriate portions of my body for medical, scientific, or educational purposes, provided my identity is not revealed by the pictures or by descriptive texts accompanying them. If the procedure has been photographed/videotaped, the surgeon will obtain the original picture, image, videotape or CD. The hospital will not be responsible for storage, release or maintenance of the picture, image, tape or CD.    7.   I consent to the presence of a  or observers in the operating room as deemed necessary by my physician or their designees.     8.   I recognize that in the event my procedure results in extended X-Ray/fluoroscopy time, I may develop a skin reaction. 9. If I have a Do Not Attempt Resuscitation (DNAR) order in place, that status will be suspended while in the operating room, procedural suite, and during the recovery period unless otherwise explicitly stated by me (or a person authorized to consent on my behalf). The surgeon or my attending physician will determine when the applicable recovery period ends for purposes of reinstating the DNAR order. 10. Patients having a sterilization procedure: I understand that if the procedure is successful the results will be permanent and it will therefore be impossible for me to inseminate, conceive, or bear children. I also understand that the procedure is intended to result in sterility, although the result has not been guaranteed. 11. I acknowledge that my physician has explained sedation/analgesia administration to me including the risk and benefits I consent to the administration of sedation/analgesia as may be necessary or desirable in the judgment of my physician. I CERTIFY THAT I HAVE READ AND FULLY UNDERSTAND THE ABOVE CONSENT TO OPERATION and/or OTHER PROCEDURE.     _________________________________________ _________________________________     ___________________________________  Signature of Patient     Signature of Responsible Person                   Printed Name of Responsible Person                              _________________________________________ ______________________________        ___________________________________  Signature of Witness         Date  Time         Relationship to Patient    STATEMENT OF PHYSICIAN My signature below affirms that prior to the time of the procedure; I have explained to the patient and/or his/her legal representative, the risks and benefits involved in the proposed treatment and any reasonable alternative to the proposed treatment.  I have also explained the risks and benefits involved in refusal of the proposed treatment and alternatives to the proposed treatment and have answered the patient's questions.  If I have a significant financial interest in a co-management agreement or a significant financial interest in any product or implant, or other significant relationship used in this procedure/surgery, I have disclosed this and had a discussion with my patient.     _______________________________________________________________ _____________________________  Ora Gupta of Physician)                                                                                         (Date)                                   (Time)  Patient Name: Ash Granado    : 1970   Printed: 2023      Medical Record #: [id number]                                              Page 1 of 1

## (undated) NOTE — LETTER
[address], [place], [place]  Authorization for Surgical Operation and Procedure                                                                                           1. I hereby authorize Carrie Duncan MD, my physician and his/her assistants (if applicable), which may include medical students, residents, and/or fellows, to perform the following surgical operation/ procedure and administer such anesthesia as may be determined necessary by my physician: Operation/Procedure name (s) ENDOSCOPIC RETROGRADE CHOLANGIOPANCREATOGRAPHY (ERCP) on Cammie Nunn   2. I recognize that during the surgical operation/procedure, unforeseen conditions may necessitate additional or different procedures than those listed above. I, therefore, further authorize and request that the above-named surgeon, assistants, or designees perform such procedures as are, in their judgment, necessary and desirable. 3.   My surgeon/physician has discussed prior to my surgery the potential benefits, risks and side effects of this procedure; the likelihood of achieving goals; and potential problems that might occur during recuperation. They also discussed reasonable alternatives to the procedure, including risks, benefits, and side effects related to the alternatives and risks related to not receiving this procedure. I have had all my questions answered and I acknowledge that no guarantee has been made as to the result that may be obtained. 4.   Should the need arise during my operation/procedure, which includes change of level of care prior to discharge, I also consent to the administration of blood and/or blood products. Further, I understand that despite careful testing and screening of blood or blood products by collecting agencies, I may still be subject to ill effects as a result of receiving a blood transfusion and/or blood products.   The following are some, but not all, of the potential risks that can occur: fever and allergic reactions, hemolytic reactions, transmission of diseases such as Hepatitis, AIDS and Cytomegalovirus (CMV) and fluid overload. In the event that I wish to have an autologous transfusion of my own blood, or a directed donor transfusion, I will discuss this with my physician. Check only if Refusing Blood or Blood Products  I understand refusal of blood or blood products as deemed necessary by my physician may have serious consequences to my condition to include possible death. I hereby assume responsibility for my refusal and release the hospital, its personnel, and my physicians from any responsibility for the consequences of my refusal.    o  Refuse   5. I authorize the use of any specimen, organs, tissues, body parts or foreign objects that may be removed from my body during the operation/procedure for diagnosis, research or teaching purposes and their subsequent disposal by hospital authorities. I also authorize the release of specimen test results and/or written reports to my treating physician on the hospital medical staff or other referring or consulting physicians involved in my care, at the discretion of the Pathologist or my treating physician. 6.   I consent to the photographing or videotaping of the operations or procedures to be performed, including appropriate portions of my body for medical, scientific, or educational purposes, provided my identity is not revealed by the pictures or by descriptive texts accompanying them. If the procedure has been photographed/videotaped, the surgeon will obtain the original picture, image, videotape or CD. The hospital will not be responsible for storage, release or maintenance of the picture, image, tape or CD.    7.   I consent to the presence of a  or observers in the operating room as deemed necessary by my physician or their designees.     8.   I recognize that in the event my procedure results in extended X-Ray/fluoroscopy time, I may develop a skin reaction. 9. If I have a Do Not Attempt Resuscitation (DNAR) order in place, that status will be suspended while in the operating room, procedural suite, and during the recovery period unless otherwise explicitly stated by me (or a person authorized to consent on my behalf). The surgeon or my attending physician will determine when the applicable recovery period ends for purposes of reinstating the DNAR order. 10. Patients having a sterilization procedure: I understand that if the procedure is successful the results will be permanent and it will therefore be impossible for me to inseminate, conceive, or bear children. I also understand that the procedure is intended to result in sterility, although the result has not been guaranteed. 11. I acknowledge that my physician has explained sedation/analgesia administration to me including the risk and benefits I consent to the administration of sedation/analgesia as may be necessary or desirable in the judgment of my physician. I CERTIFY THAT I HAVE READ AND FULLY UNDERSTAND THE ABOVE CONSENT TO OPERATION and/or OTHER PROCEDURE.     _________________________________________ _________________________________     ___________________________________  Signature of Patient     Signature of Responsible Person                   Printed Name of Responsible Person                              _________________________________________ ______________________________        ___________________________________  Signature of Witness         Date  Time         Relationship to Patient    STATEMENT OF PHYSICIAN My signature below affirms that prior to the time of the procedure; I have explained to the patient and/or his/her legal representative, the risks and benefits involved in the proposed treatment and any reasonable alternative to the proposed treatment.  I have also explained the risks and benefits involved in refusal of the proposed treatment and alternatives to the proposed treatment and have answered the patient's questions.  If I have a significant financial interest in a co-management agreement or a significant financial interest in any product or implant, or other significant relationship used in this procedure/surgery, I have disclosed this and had a discussion with my patient.     _______________________________________________________________ _____________________________  Ora Gupta of Physician)                                                                                         (Date)                                   (Time)  Patient Name: Lisa Schreiber    : 1970   Printed: 2022      Medical Record #: [id number]                                              Page 1 of 1

## (undated) NOTE — LETTER
3/1/2023          To Whom It May Concern:    Lizzie Hallman is currently under my medical care and may return to work on March 6, 2023. Activity is restricted as follows: No restrictions. If you require additional information please contact our office.         Sincerely,          Lana Rhodes MD          Document generated by:  Kaleigh Roth RN

## (undated) NOTE — LETTER
4/25/2024              Cammie SLAUGHTER Degraffenreid        [address]        [facility] [address]         Dear Cammie,    Our records indicate that the tests ordered for you by Jeison Diamond MD  have not been done.  If you have, in fact, already completed the tests or you do not wish to have the tests done, please contact our office at THE NUMBER LISTED BELOW.  Otherwise, please proceed with the testing.    To schedule a test at any [facility] Facility, call Central Scheduling at [phone], Monday through Friday between 7:30am to 6pm and on Saturday between 8am and 1pm.   Evening and weekend appointments for your exam are available.   Orders Placed on 3/12/2024  Comp Metabolic Panel (14) weekly  Prothrombin Time (PT/INR)      Sincerely,    Jeison Diamond MD  [address] [id number] [phone]

## (undated) NOTE — LETTER
8/8/2023              Cammie SLAUGHTER Degraffenreid        [address] [facility]         Dear Brook Carrier records indicate that the tests ordered for you by Mike Canada MD  have not been done. If you have, in fact, already completed the tests or you do not wish to have the tests done, please contact our office at [address] [place] [address]. Otherwise, please proceed with the testing. Labs Order:   Please call Central Scheduling  at [phone] to schedule this test order.                                CBC, PLATELET; NO DIFFERENTIAL  COMP METABOLIC PANEL (14)  FERRITIN  IRON AND TIBC    Sincerely,    Mike Canada MD  [facility], [address] [address] [phone]

## (undated) NOTE — LETTER
1/29/2024              Cammie SLAUGHTER Degraffenreid        [address]        [facility] [address]         Dear Cammie,    Our records indicate that the tests ordered for you by eJison Diamond MD  have not been done.  If you have, in fact, already completed the tests or you do not wish to have the tests done, please contact our office at THE NUMBER LISTED BELOW.  Otherwise, please proceed with the testing.    Imaging order :     US BIOPSY LIVER (CPT=76942/28782)  z [facility] ABDOMEN (W+WO) (CPT=74183)    To schedule a test at any [facility] Facility, call Central Scheduling at [phone], Monday through Friday between 7:30am to 6pm and on Saturday between 8am and 1pm.   Evening and weekend appointments for your exam are available.   Please call [facility] at [phone] to schedule your Imaging order.     Sincerely,    Jeison Diamond MD  [facility], [address] [id number] [phone]

## (undated) NOTE — ED AVS SNAPSHOT
Junior Woodall   MRN: [id number]    Department:  [facility] Emergency Department   Date of Visit:  10/17/2018           Disclosure     Insurance plans vary and the physician(s) referred by the ER may not be covered by your plan.  Please c CARE PHYSICIAN AT ONCE OR RETURN IMMEDIATELY TO THE EMERGENCY DEPARTMENT. If you have been prescribed any medication(s), please fill your prescription right away and begin taking the medication(s) as directed.   If you believe that any of the medications

## (undated) NOTE — LETTER
Date & Time: 10/17/2018, 9:37 PM  Patient: Twyla Ramirez  Encounter Provider(s): Maurice Landeros MD       To Whom It May Concern:    Twyla Ramirez was seen and treated in our department on 10/17/2018.  Please excuse from work 10/18 and 1

## (undated) NOTE — LETTER
[address], [place], [place]  Authorization for Surgical Operation and Procedure                                                                                           1. I hereby authorize Dr. Howard Mtz MD, my physician and his/her assistants (if applicable), which may include medical students, residents, and/or fellows, to perform the following surgical operation/ procedure and administer such anesthesia as may be determined necessary by my physician: Operation/Procedure name (s) Open Cholecystectomy on Cammie Degraffenreid   2. I recognize that during the surgical operation/procedure, unforeseen conditions may necessitate additional or different procedures than those listed above. I, therefore, further authorize and request that the above-named surgeon, assistants, or designees perform such procedures as are, in their judgment, necessary and desirable. 3.   My surgeon/physician has discussed prior to my surgery the potential benefits, risks and side effects of this procedure; the likelihood of achieving goals; and potential problems that might occur during recuperation. They also discussed reasonable alternatives to the procedure, including risks, benefits, and side effects related to the alternatives and risks related to not receiving this procedure. I have had all my questions answered and I acknowledge that no guarantee has been made as to the result that may be obtained. 4.   Should the need arise during my operation/procedure, which includes change of level of care prior to discharge, I also consent to the administration of blood and/or blood products. Further, I understand that despite careful testing and screening of blood or blood products by collecting agencies, I may still be subject to ill effects as a result of receiving a blood transfusion and/or blood products.   The following are some, but not all, of the potential risks that can occur: fever and allergic reactions, hemolytic reactions, transmission of diseases such as Hepatitis, AIDS and Cytomegalovirus (CMV) and fluid overload. In the event that I wish to have an autologous transfusion of my own blood, or a directed donor transfusion, I will discuss this with my physician. Check only if Refusing Blood or Blood Products  I understand refusal of blood or blood products as deemed necessary by my physician may have serious consequences to my condition to include possible death. I hereby assume responsibility for my refusal and release the hospital, its personnel, and my physicians from any responsibility for the consequences of my refusal.    o  Refuse   5. I authorize the use of any specimen, organs, tissues, body parts or foreign objects that may be removed from my body during the operation/procedure for diagnosis, research or teaching purposes and their subsequent disposal by hospital authorities. I also authorize the release of specimen test results and/or written reports to my treating physician on the hospital medical staff or other referring or consulting physicians involved in my care, at the discretion of the Pathologist or my treating physician. 6.   I consent to the photographing or videotaping of the operations or procedures to be performed, including appropriate portions of my body for medical, scientific, or educational purposes, provided my identity is not revealed by the pictures or by descriptive texts accompanying them. If the procedure has been photographed/videotaped, the surgeon will obtain the original picture, image, videotape or CD. The hospital will not be responsible for storage, release or maintenance of the picture, image, tape or CD.    7.   I consent to the presence of a  or observers in the operating room as deemed necessary by my physician or their designees.     8.   I recognize that in the event my procedure results in extended X-Ray/fluoroscopy time, I may develop a skin reaction. 9. If I have a Do Not Attempt Resuscitation (DNAR) order in place, that status will be suspended while in the operating room, procedural suite, and during the recovery period unless otherwise explicitly stated by me (or a person authorized to consent on my behalf). The surgeon or my attending physician will determine when the applicable recovery period ends for purposes of reinstating the DNAR order. 10. Patients having a sterilization procedure: I understand that if the procedure is successful the results will be permanent and it will therefore be impossible for me to inseminate, conceive, or bear children. I also understand that the procedure is intended to result in sterility, although the result has not been guaranteed. 11. I acknowledge that my physician has explained sedation/analgesia administration to me including the risk and benefits I consent to the administration of sedation/analgesia as may be necessary or desirable in the judgment of my physician. I CERTIFY THAT I HAVE READ AND FULLY UNDERSTAND THE ABOVE CONSENT TO OPERATION and/or OTHER PROCEDURE.     _________________________________________ _________________________________     ___________________________________  Signature of Patient     Signature of Responsible Person                   Printed Name of Responsible Person                              _________________________________________ ______________________________        ___________________________________  Signature of Witness         Date  Time         Relationship to Patient    STATEMENT OF PHYSICIAN My signature below affirms that prior to the time of the procedure; I have explained to the patient and/or his/her legal representative, the risks and benefits involved in the proposed treatment and any reasonable alternative to the proposed treatment.  I have also explained the risks and benefits involved in refusal of the proposed treatment and alternatives to the proposed treatment and have answered the patient's questions.  If I have a significant financial interest in a co-management agreement or a significant financial interest in any product or implant, or other significant relationship used in this procedure/surgery, I have disclosed this and had a discussion with my patient.     _______________________________________________________________ _____________________________  Xiao Rodriguez Physician)                                                                                         (Date)                                   (Time)  Patient Name: Omid Espinal    : 1970   Printed: 2022      Medical Record #: [id number]                                              Page 1 of 1

## (undated) NOTE — LETTER
[facility] 5SW/SE  [address]  [facility] [address]  [phone]    Blood Transfusion Consent    In the course of your treatment, it may become necessary to administer a transfusion of blood or blood components. This form provides basic information concerning this procedure and, if signed by you, authorizes its administration. By signing this form, you agree that all of your questions about the administration of blood or blood products have been answered by the ordering medical professional or designee.    Description of Procedure  Blood is introduced into one of your veins, commonly in the arm, using a sterilized disposable needle. The amount of blood transfused, and whether the transfusion will be of blood or blood components is a judgement the physician will make based on your particular needs.    Risks  The transfusion is a common procedure of low risk.  MINOR AND TEMPORARY REACTIONS ARE NOT UNCOMMON, including a slight bruise, swelling or local reaction in the area where the needle pierces your skin, or a nonserious reaction to the transfused material itself, including headache, fever or mild skin reaction, such as rash.  Serious reactions are possible, though very unlikely, and include severe allergic reaction (shock) and destruction (hemolysis) of transfused blood cells.  Infectious diseases which are known to be transmitted by blood transfusion include certain types of viral Hepatitis(liver infection from a virus), Human Immunodeficiency Virus (HIV-1,2) infection, a viral infection known to cause Acquired Immunodeficiency Syndrome (AIDS), as well as certain other bacterial, viral, and parasitic diseases. While a minimal risk of acquiring an infectious disease from transfused blood exists, in accordance with the Federal and State law, all due care has been taken in donor selection and testing to avoid transmission of disease.    Alternatives  If loss of blood poses serious threats during your  treatment, THERE IS NO EFFECTIVE ALTERNATIVE TO BLOOD TRANSFUSION. However, if you have any further questions on this matter, your provider will fully explain the alternatives to you if it has not already been done.    I, ______________________________, have read/had read to me the above. I understand the matters bearing on the decision whether or not to authorize a transfusion of blood or blood components. I have no questions which have not been answered to my full satisfaction. I hereby consent to such transfusion as my physician may deem necessary or advisable in the course of my treatment.    ______________________________________________                    ___________________________  (Signature of Patient or Responsible party in case of minor,                 (Printed Name of Patient or incompetent, or unconscious patient)              Responsible Party)    ___________________________               _____________________  (Relationship to Patient if not self)                                    (Date and Time)    __________________________                                                           ______________________              (Signature of Witness)               (Printed Name of Witness)     Language line ()    Telephone/Verbal/Video Consent    __________________________                     ____________________  (Signature of 2nd Witness           (Printed Name of 2nd  Telephone/Verbal/Video Consent)           Witness)    Patient Name: Cammie Villarffenreielena     : 1970                 Printed: 2024     Medical Record #: [id number]      Rev: 2023

## (undated) NOTE — LETTER
Date & Time: 10/17/2018, 9:31 PM  Patient: Lesleigh Patch  Encounter Provider(s): Rory Glaser MD       To Whom It May Concern:    Lesleigh Patch was seen and treated in our department on 10/17/2018.  Please excuse from work 10/18 and 1

## (undated) NOTE — LETTER
[facility] [address], [place], [place]  [address]  INFORMED CONSENT FOR TRANSFUSION OF BLOOD OR BLOOD PRODUCTS  My physician has informed me of the nature, purpose, benefits and risks of transfusion for blood and blood components that he/she may deem necessary during my treatment or hospitalization. He/she has also discussed alternatives to receiving blood from the voluntary blood supply with me, such as self-donation (autologous) and directed donation (blood donated by family or friends to be used specifically for me). I further understand that while the [address] [place] [address] will attempt to supply any autologous or directed donor blood prior to transfusion of blood from the routine blood supply, medical circumstances may require that other or additional blood components may be required for my care. In giving consent, I acknowledge that my physician has also informed me that despite careful screening and testing in accordance with national and regional regulations, there is still a small risk of transmission of infectious agents including hepatitis, HIV-1/2, cytomegalovirus and other viruses or diseases as yet unknown for which licensed definitive screening tests do not currently exist. Additionally, my physician has informed me of the potential for transfusion reactions not related to an infectious agent. [  ]  Check here for Recurring Outpatient Transfusion Therapy (valid for 1 year) In addition to the above, my physician has informed me that I shall receive numerous transfusions over a period of time and that these can lead to other increased risks. I hereby authorize the transfusion of blood and/or blood products to me as deemed necessary and ordered by physicians participating in my care.  My physician has given me the opportunity to ask questions and any questions asked have been answered to my satisfaction  __________________________________________ ______________________________________________  (Signature of Patient)                                                            (Responsible party in case of Minor,                                                                                                 Incompetent, or unconscious Patient)  ___________________________________________       ________ ______________________________________  (Relationship to Patient)                                                       (Signature of Witness)  ______________________________________________________________________________________________   (Date)                                                                           (Time)  REFUSAL OF CONSENT FOR BLOOD TRANSFUSIONS   Sign only if Refusing   [  ] I understand refusal of blood or blood products as deemed necessary by my physician may have serious consequences to my condition to include possible death.  I hereby assume responsibility for my refusal and release the hospital, its personnel, and my physicians from any responsibility for the consequences of my refusal.    ________________________________________________________________________________  (Signature of Patient)                                                         (Responsible Party/Relationship to Patient)    ________________________________________________________________________________  (Signature of Witness)                                                       (Date/Time)     Patient Name: Ursula Powers     : 1970                 Printed: 2022      Medical Record #: [id number]                                 Page 1 of 1

## (undated) NOTE — LETTER
[address], [place], [place]  Authorization for Surgical Operation and Procedure                                                                                           1. I hereby authorize Cruzito Javier MD, my physician and his/her assistants (if applicable), which may include medical students, residents, and/or fellows, to perform the following surgical operation/ procedure and administer such anesthesia as may be determined necessary by my physician: Operation/Procedure name (s) ENDOSCOPIC RETROGRADE CHOLANGIOPANCREATOGRAPHY (ERCP) on Cammie Nunn   2. I recognize that during the surgical operation/procedure, unforeseen conditions may necessitate additional or different procedures than those listed above. I, therefore, further authorize and request that the above-named surgeon, assistants, or designees perform such procedures as are, in their judgment, necessary and desirable. 3.   My surgeon/physician has discussed prior to my surgery the potential benefits, risks and side effects of this procedure; the likelihood of achieving goals; and potential problems that might occur during recuperation. They also discussed reasonable alternatives to the procedure, including risks, benefits, and side effects related to the alternatives and risks related to not receiving this procedure. I have had all my questions answered and I acknowledge that no guarantee has been made as to the result that may be obtained. 4.   Should the need arise during my operation/procedure, which includes change of level of care prior to discharge, I also consent to the administration of blood and/or blood products. Further, I understand that despite careful testing and screening of blood or blood products by collecting agencies, I may still be subject to ill effects as a result of receiving a blood transfusion and/or blood products.   The following are some, but not all, of the potential risks that can occur: fever and allergic reactions, hemolytic reactions, transmission of diseases such as Hepatitis, AIDS and Cytomegalovirus (CMV) and fluid overload. In the event that I wish to have an autologous transfusion of my own blood, or a directed donor transfusion, I will discuss this with my physician. Check only if Refusing Blood or Blood Products  I understand refusal of blood or blood products as deemed necessary by my physician may have serious consequences to my condition to include possible death. I hereby assume responsibility for my refusal and release the hospital, its personnel, and my physicians from any responsibility for the consequences of my refusal.    o  Refuse   5. I authorize the use of any specimen, organs, tissues, body parts or foreign objects that may be removed from my body during the operation/procedure for diagnosis, research or teaching purposes and their subsequent disposal by hospital authorities. I also authorize the release of specimen test results and/or written reports to my treating physician on the hospital medical staff or other referring or consulting physicians involved in my care, at the discretion of the Pathologist or my treating physician. 6.   I consent to the photographing or videotaping of the operations or procedures to be performed, including appropriate portions of my body for medical, scientific, or educational purposes, provided my identity is not revealed by the pictures or by descriptive texts accompanying them. If the procedure has been photographed/videotaped, the surgeon will obtain the original picture, image, videotape or CD. The hospital will not be responsible for storage, release or maintenance of the picture, image, tape or CD.    7.   I consent to the presence of a  or observers in the operating room as deemed necessary by my physician or their designees.     8.   I recognize that in the event my procedure results in extended X-Ray/fluoroscopy time, I may develop a skin reaction. 9. If I have a Do Not Attempt Resuscitation (DNAR) order in place, that status will be suspended while in the operating room, procedural suite, and during the recovery period unless otherwise explicitly stated by me (or a person authorized to consent on my behalf). The surgeon or my attending physician will determine when the applicable recovery period ends for purposes of reinstating the DNAR order. 10. Patients having a sterilization procedure: I understand that if the procedure is successful the results will be permanent and it will therefore be impossible for me to inseminate, conceive, or bear children. I also understand that the procedure is intended to result in sterility, although the result has not been guaranteed. 11. I acknowledge that my physician has explained sedation/analgesia administration to me including the risk and benefits I consent to the administration of sedation/analgesia as may be necessary or desirable in the judgment of my physician. I CERTIFY THAT I HAVE READ AND FULLY UNDERSTAND THE ABOVE CONSENT TO OPERATION and/or OTHER PROCEDURE.     _________________________________________ _________________________________     ___________________________________  Signature of Patient     Signature of Responsible Person                   Printed Name of Responsible Person                              _________________________________________ ______________________________        ___________________________________  Signature of Witness         Date  Time         Relationship to Patient    STATEMENT OF PHYSICIAN My signature below affirms that prior to the time of the procedure; I have explained to the patient and/or his/her legal representative, the risks and benefits involved in the proposed treatment and any reasonable alternative to the proposed treatment.  I have also explained the risks and benefits involved in refusal of the proposed treatment and alternatives to the proposed treatment and have answered the patient's questions.  If I have a significant financial interest in a co-management agreement or a significant financial interest in any product or implant, or other significant relationship used in this procedure/surgery, I have disclosed this and had a discussion with my patient.     _______________________________________________________________ _____________________________  Yoel Araya of Physician)                                                                                         (Date)                                   (Time)  Patient Name: Shana Chowdhury    : 1970   Printed: 2022      Medical Record #: [id number]                                              Page 1 of 1